# Patient Record
Sex: FEMALE | Race: WHITE | NOT HISPANIC OR LATINO | Employment: UNEMPLOYED | URBAN - METROPOLITAN AREA
[De-identification: names, ages, dates, MRNs, and addresses within clinical notes are randomized per-mention and may not be internally consistent; named-entity substitution may affect disease eponyms.]

---

## 2017-01-18 ENCOUNTER — INITIAL CONSULT (OUTPATIENT)
Dept: RHEUMATOLOGY | Facility: CLINIC | Age: 57
End: 2017-01-18
Payer: COMMERCIAL

## 2017-01-18 VITALS
HEART RATE: 73 BPM | WEIGHT: 232.5 LBS | SYSTOLIC BLOOD PRESSURE: 134 MMHG | HEIGHT: 65 IN | BODY MASS INDEX: 38.74 KG/M2 | DIASTOLIC BLOOD PRESSURE: 90 MMHG

## 2017-01-18 DIAGNOSIS — M35.00 SICCA SYNDROME, UNSPECIFIED: Primary | ICD-10-CM

## 2017-01-18 DIAGNOSIS — M47.816 SPONDYLOSIS OF LUMBAR REGION WITHOUT MYELOPATHY OR RADICULOPATHY: ICD-10-CM

## 2017-01-18 PROCEDURE — 1159F MED LIST DOCD IN RCRD: CPT | Mod: S$GLB,,, | Performed by: INTERNAL MEDICINE

## 2017-01-18 PROCEDURE — 99999 PR PBB SHADOW E&M-EST. PATIENT-LVL III: CPT | Mod: PBBFAC,,, | Performed by: INTERNAL MEDICINE

## 2017-01-18 PROCEDURE — 99204 OFFICE O/P NEW MOD 45 MIN: CPT | Mod: S$GLB,,, | Performed by: INTERNAL MEDICINE

## 2017-01-18 ASSESSMENT — ROUTINE ASSESSMENT OF PATIENT INDEX DATA (RAPID3)
TOTAL RAPID3 SCORE: 2.44
FATIGUE SCORE: 3
WHEN YOU AWAKENED IN THE MORNING OVER THE LAST WEEK, PLEASE INDICATE THE AMOUNT OF TIME IT TAKES UNTIL YOU ARE AS LIMBER AS YOU WILL BE FOR THE DAY: 20 MIN
PSYCHOLOGICAL DISTRESS SCORE: 3.3
AM STIFFNESS SCORE: 1, YES
MDHAQ FUNCTION SCORE: .7
PATIENT GLOBAL ASSESSMENT SCORE: 2
PAIN SCORE: 3

## 2017-01-18 NOTE — LETTER
January 18, 2017      Shyanne Juarez MD  [address] [place] [address]           [facility] - Rheumatology  [address]  [place] [address]  Phone: [phone]  Fax: [phone]          Patient: Samuel Kilgore   MR Number: [id number]   YOB: 1960   Date of Visit: 1/18/2017       Dear Dr. Shyanne Juarez:    Thank you for referring Samuel Kilgore to me for evaluation. Attached you will find relevant portions of my assessment and plan of care.    If you have questions, please do not hesitate to call me. I look forward to following Samuel Kiglore along with you.    Sincerely,    Surya Boucher MD    Enclosure  CC:  No Recipients    If you would like to receive this communication electronically, please contact [email or web address] or [phone] to request more information on [employer] access.    For providers and/or their staff who would like to refer a patient to [facility], please contact us through our one-stop-shop provider referral line, [facility], at [phone].    If you feel you have received this communication in error or would no longer like to receive these types of communications, please e-mail [email or web address]

## 2017-01-18 NOTE — PROGRESS NOTES
"Subjective:       Patient ID: Samuel Kilgore is a 56 y.o. female.    Chief Complaint: Consult (Abnormal blood tests)    HPI   The patient is referred by her neurologist for abnormal blood tests and possible Sjogren syndrome    Hx migraines; was very tired and neurologist did lab and found very low Vit D and "lab for Sjogren's syndrome"    Migraines for many years and did not know it"    Had chemo after breast cancer 6 years ago; intermittent fatigue since and bouts of depression   is a passive aggressive control freak  Has had long bouts of depression at times  Has not exercised in 2 years; [facility] and [facility] 15 [facility]; developed plantar fasciitis  Got on elliptical yesterday; 30 min; LE pain  Thigh muscles hurt all the time and are tender and sensitive   Used to get bursitis in shoulders; now for last 6 mo trapezius pain  Plans to keep exercising now  Back stiff in am; chiropractor said she was very stiff    Takes trazodone every night  Letrozole every night     Recall taking gabapentin for sciatica for a few weeks then shots worked   Currently using essential oils and "they work"                                  Widespread pain index  Note the areas which the patient has had pain over the last week:                   Shoulder-girdle, left o               Shoulder-girdle, righto                         Upper arm lefto                       Upper arm righto                         Lower arm lefto                       Lower arm righto    Hip (buttock, trochanter) leftx  Hip (buttock, trochanter) rightx                           Upper leg, leftx                         Upper leg, rightx                           Lower leg, lefto                         Lower leg, righto                                     Jaw, lefto                                   Jaw, righto                                        Chestx                                  Abdomeno                               Upper backo   "                            Lower backx                                        Necko  Score will be from 0-19: 6                                         Symptom severity score  Fatigue  2  Waking Unrefreshed  1  Cognitive Symptoms  1   0 = no problem, 1=slight or mild problem 2= moderate; considerable problems often present and/or at a moderate level, 3 = severe, pervasive, continuous, life disturbing problem  For each of the 3 symptoms, indicate the level of severity over the past week using the Scale.  The symptom severity score is the sum of the severity of the 3 symptoms (fatigue, waking unrefreshed, and cognitive symptoms) plus the number of the following symptoms occurring during the previous 6 months:   Headaches 1  Pain or cramps in the lower abdomen 0  Depression  1-2  The final score is between 0 and 12: 6-7                                          Criteria  Patient has fibromyalgia if the following 3 conditions are met:  1.  Widespread pain index greater than or equal to 7 and symptom severity score greater than or equal to 5 or widespread pain index between 3- 6, and symptom severity score greater than or equal to 9.    2.  Symptoms have been present in a similar level for at least 3 months  3.  The patient does not have a disorder that would otherwise sufficiently explain the pain    Review of Systems   Constitutional: Negative for fatigue, fever and unexpected weight change.   HENT: Negative for mouth sores and trouble swallowing.         Dry mouth   Eyes: Negative for redness.        Dry eyes   Respiratory: Positive for shortness of breath. Negative for cough.    Cardiovascular: Negative for chest pain.   Gastrointestinal: Positive for constipation. Negative for abdominal pain and diarrhea.        Heartburn    Skin: Negative for rash.   Neurological: Positive for headaches.        Skin feel sensitive and burns on feet   Hematological: Negative for adenopathy. Does not bruise/bleed easily.  "  Psychiatric/Behavioral: Negative for sleep disturbance.         Objective:     Visit Vitals    BP (!) 134/90    Pulse 73    Ht 5' 5" (1.651 m)    Wt 105.5 kg (232 lb 8 oz)    BMI 38.69 kg/m2        Physical Exam   Constitutional: She is well-developed, well-nourished, and in no distress.   She is very chatty   HENT:   Mouth/Throat: Oropharynx is clear and moist.   Oral mucous membranes are moist  No submandibular or parotid enlargement   Eyes: Conjunctivae are normal.   No conjunctival injection   Cardiovascular: Normal rate and regular rhythm.    Pulmonary/Chest: She has no wheezes. She has no rales.   Lymphadenopathy:     She has no cervical adenopathy.   Neurological: She is alert.   Skin: No rash noted.           Assessment:       1. Sicca syndrome, unspecified        She has intermittent symptoms of dry eyes and dry mouth but she also has a somewhat complicated past medical history including breast cancer surgery and current hormonal chemotherapy.  She does not have physical changes suspicious for Sjogren syndrome but I will obtain additional laboratory tests to investigate    She has a lot of somatic symptoms.  She does not quite meet criteria for fibromyalgia but I suspect that she has in the past.  Stress and depression and sleep disorder clearly contribute to this  Plan:     1.  Repeat antibodies for Sjogren's syndrome and also check inflammatory markers and protein like pheresis  2.  We discussed fibromyalgia and I did not recommend medications, but did talk to her about exercise, stress management, and sleep hygiene.  She would probably be a good candidate for Cymbalta if the fibromyalgia symptoms worsen in the future  3.  I will determine follow-up after obtaining laboratory studies.  Copies of this note and additional labs will be sent to her neurologist.        "

## 2017-01-24 ENCOUNTER — PATIENT MESSAGE (OUTPATIENT)
Dept: RHEUMATOLOGY | Facility: CLINIC | Age: 57
End: 2017-01-24

## 2017-03-19 DIAGNOSIS — K21.9 GASTROESOPHAGEAL REFLUX DISEASE, ESOPHAGITIS PRESENCE NOT SPECIFIED: ICD-10-CM

## 2017-03-19 RX ORDER — ESOMEPRAZOLE MAGNESIUM 40 MG/1
CAPSULE, DELAYED RELEASE ORAL
Qty: 30 CAPSULE | Refills: 0 | Status: SHIPPED | OUTPATIENT
Start: 2017-03-19 | End: 2017-05-29 | Stop reason: SDUPTHER

## 2017-05-17 ENCOUNTER — OFFICE VISIT (OUTPATIENT)
Dept: FAMILY MEDICINE | Facility: CLINIC | Age: 57
End: 2017-05-17
Payer: COMMERCIAL

## 2017-05-17 VITALS
BODY MASS INDEX: 38.75 KG/M2 | HEIGHT: 65 IN | DIASTOLIC BLOOD PRESSURE: 80 MMHG | HEART RATE: 84 BPM | WEIGHT: 232.56 LBS | OXYGEN SATURATION: 95 % | TEMPERATURE: 99 F | SYSTOLIC BLOOD PRESSURE: 132 MMHG

## 2017-05-17 DIAGNOSIS — R09.82 POST-NASAL DRIP: ICD-10-CM

## 2017-05-17 DIAGNOSIS — J06.9 VIRAL URI WITH COUGH: Primary | ICD-10-CM

## 2017-05-17 DIAGNOSIS — R09.81 NASAL CONGESTION: ICD-10-CM

## 2017-05-17 PROCEDURE — 1160F RVW MEDS BY RX/DR IN RCRD: CPT | Mod: S$GLB,,, | Performed by: NURSE PRACTITIONER

## 2017-05-17 PROCEDURE — 99999 PR PBB SHADOW E&M-EST. PATIENT-LVL IV: CPT | Mod: PBBFAC,,, | Performed by: NURSE PRACTITIONER

## 2017-05-17 PROCEDURE — 99214 OFFICE O/P EST MOD 30 MIN: CPT | Mod: S$GLB,,, | Performed by: NURSE PRACTITIONER

## 2017-05-17 RX ORDER — PROMETHAZINE HYDROCHLORIDE AND DEXTROMETHORPHAN HYDROBROMIDE 6.25; 15 MG/5ML; MG/5ML
5 SYRUP ORAL NIGHTLY
Qty: 180 ML | Refills: 0 | Status: SHIPPED | OUTPATIENT
Start: 2017-05-17 | End: 2017-05-27

## 2017-05-17 RX ORDER — BENZONATATE 100 MG/1
100 CAPSULE ORAL 3 TIMES DAILY PRN
Qty: 90 CAPSULE | Refills: 0 | Status: SHIPPED | OUTPATIENT
Start: 2017-05-17 | End: 2017-06-16

## 2017-05-17 RX ORDER — LEVOCETIRIZINE DIHYDROCHLORIDE 5 MG/1
5 TABLET, FILM COATED ORAL NIGHTLY
Qty: 30 TABLET | Refills: 0 | Status: SHIPPED | OUTPATIENT
Start: 2017-05-17 | End: 2018-09-10 | Stop reason: ALTCHOICE

## 2017-05-17 NOTE — PATIENT INSTRUCTIONS
Viral Upper Respiratory Illness (Adult)  You have a viral upper respiratory illness (URI), which is another term for the common cold. This illness is contagious during the first few days. It is spread through the air by coughing and sneezing. It may also be spread by direct contact (touching the sick person and then touching your own eyes, nose, or mouth). Frequent handwashing will decrease risk of spread. Most viral illnesses go away within 7 to 10 days with rest and simple home remedies. Sometimes the illness may last for several weeks. Antibiotics will not kill a virus, and they are generally not prescribed for this condition.    Home care  · If symptoms are severe, rest at home for the first 2 to 3 days. When you resume activity, don't let yourself get too tired.  · Avoid being exposed to cigarette smoke (yours or others).  · You may use acetaminophen or ibuprofen to control pain and fever, unless another medicine was prescribed. (Note: If you have chronic liver or kidney disease, have ever had a stomach ulcer or gastrointestinal bleeding, or are taking blood-thinning medicines, talk with your healthcare provider before using these medicines.) Aspirin should never be given to anyone under 18 years of age who is ill with a viral infection or fever. It may cause severe liver or brain damage.  · Your appetite may be poor, so a light diet is fine. Avoid dehydration by drinking 6 to 8 glasses of fluids per day (water, soft drinks, juices, tea, or soup). Extra fluids will help loosen secretions in the nose and lungs.  · Over-the-counter cold medicines will not shorten the length of time youre sick, but they may be helpful for the following symptoms: cough, sore throat, and nasal and sinus congestion. (Note: Do not use decongestants if you have high blood pressure.)  Follow-up care  Follow up with your healthcare provider, or as advised.  When to seek medical advice  Call your healthcare provider right away if any  of these occur:  · Cough with lots of colored sputum (mucus)  · Severe headache; face, neck, or ear pain  · Difficulty swallowing due to throat pain  · Fever of 100.4°F (38°C)  Call 911, or get immediate medical care  Call emergency services right away if any of these occur:  · Chest pain, shortness of breath, wheezing, or difficulty breathing  · Coughing up blood  · Inability to swallow due to throat pain  Date Last Reviewed: 9/13/2015  © 3616-2854 [employer]. [address] [place] [address]. All rights reserved. This information is not intended as a substitute for professional medical care. Always follow your healthcare professional's instructions.

## 2017-05-17 NOTE — PROGRESS NOTES
History of Present Illness   Samuel Kilgore is a 56 y.o. woman with medical history as listed below who presents today for evaluation of URI symptoms which began this morning and have worsened throughout the day. She complains of nasal congestion, post nasal drip, sore throat, and productive cough. She states symptoms are making it difficult for her to rest. She also has a low grade fever and generalized fatigue. She would like to address cough as this commonly triggers a migraine. She has not tried OTC medications as she uses essential oils in place of medication if at all possible. She used her eucalyptus oil this morning with relief. She has no additional complaints and is otherwise healthy on today's visit.      Past Medical History:   Diagnosis Date    Anxiety     Cancer     Breast    Encounter for blood transfusion     Lumbar spondylosis     Migraine headache     Mild vitamin D deficiency        Past Surgical History:   Procedure Laterality Date    BREAST RECONSTRUCTION       SECTION       SECTION      MASTECTOMY      bilateral       Social History     Social History    Marital status:      Spouse name: N/A    Number of children: N/A    Years of education: N/A     Social History Main Topics    Smoking status: Never Smoker    Smokeless tobacco: Never Used    Alcohol use Yes      Comment: occassionally    Drug use: No    Sexual activity: Yes     Partners: Male     Other Topics Concern    Are You Pregnant Or Think You May Be? No    Breast-Feeding No     Social History Narrative       Family History   Problem Relation Age of Onset    Heart disease Maternal Grandfather     Diabetes Maternal Grandfather     Colon cancer Paternal Grandmother     Melanoma Paternal Grandmother     Allergies Child     Asthma Child     Cancer Neg Hx     Miscarriages / Stillbirths Neg Hx     Psoriasis Neg Hx     Lupus Neg Hx     Eczema Neg Hx     Acne Neg Hx        Review of  "Systems  Review of Systems   Constitutional: Positive for fever and malaise/fatigue. Negative for chills.   HENT: Positive for congestion and sore throat. Negative for ear discharge and ear pain.    Eyes: Negative for discharge and redness.   Respiratory: Positive for cough and sputum production. Negative for shortness of breath and wheezing.    Cardiovascular: Negative for chest pain.   Gastrointestinal: Negative for nausea and vomiting.   Neurological: Negative for headaches.     A complete review of systems was otherwise negative.    Physical Exam  /80 (BP Location: Right arm, Patient Position: Sitting, BP Method: Manual)  Pulse 84  Temp 99.1 °F (37.3 °C) (Oral)   Ht 5' 5" (1.651 m)  Wt 105.5 kg (232 lb 9.4 oz)  SpO2 95%  BMI 38.7 kg/m2  General appearance: alert, appears stated age, cooperative and no distress  Eyes: negative findings: lids and lashes normal and conjunctivae and sclerae normal  Ears: normal TM's and external ear canals both ears  Nose: clear discharge, severe congestion, turbinates red, swollen, no sinus tenderness  Throat: lips, mucosa, and tongue normal; teeth and gums normal and moderate erythema with clear PND  Lungs: clear to auscultation bilaterally  Heart: regular rate and rhythm, S1, S2 normal, no murmur, click, rub or gallop  Lymph nodes: Cervical, supraclavicular, and axillary nodes normal.  Neurologic: Grossly normal    Assessment/Plan  Viral URI with cough  Xyzal for nasal congestion and post nasal drip, patient does not take Atarax currently, advised not to take with Xyzal.  She has Flonase at home, instructed to resume.  Tessalon and nighttime cough syrup as needed for cough.  Tylenol for fever.  Rest and increase fluid intake.  May continue essential oils if providing benefit.  Discussed antibiotics are not indicated as this is viral.  Contact me or RTC if symptoms worsen or fail to improve as expected.  -     levocetirizine (XYZAL) 5 MG tablet; Take 1 tablet (5 mg " total) by mouth every evening.  Dispense: 30 tablet; Refill: 0  -     benzonatate (TESSALON) 100 MG capsule; Take 1 capsule (100 mg total) by mouth 3 (three) times daily as needed.  Dispense: 90 capsule; Refill: 0  -     promethazine-dextromethorphan (PROMETHAZINE-DM) 6.25-15 mg/5 mL Syrp; Take 5 mLs by mouth every evening.  Dispense: 180 mL; Refill: 0    Post-nasal drip  As above.  -     levocetirizine (XYZAL) 5 MG tablet; Take 1 tablet (5 mg total) by mouth every evening.  Dispense: 30 tablet; Refill: 0  -     benzonatate (TESSALON) 100 MG capsule; Take 1 capsule (100 mg total) by mouth 3 (three) times daily as needed.  Dispense: 90 capsule; Refill: 0  -     promethazine-dextromethorphan (PROMETHAZINE-DM) 6.25-15 mg/5 mL Syrp; Take 5 mLs by mouth every evening.  Dispense: 180 mL; Refill: 0    Nasal congestion  As above.  -     levocetirizine (XYZAL) 5 MG tablet; Take 1 tablet (5 mg total) by mouth every evening.  Dispense: 30 tablet; Refill: 0  -     benzonatate (TESSALON) 100 MG capsule; Take 1 capsule (100 mg total) by mouth 3 (three) times daily as needed.  Dispense: 90 capsule; Refill: 0  -     promethazine-dextromethorphan (PROMETHAZINE-DM) 6.25-15 mg/5 mL Syrp; Take 5 mLs by mouth every evening.  Dispense: 180 mL; Refill: 0    She has verbalized understanding and is in agreement with plan of care.  Return if symptoms worsen or fail to improve.

## 2017-05-17 NOTE — MR AVS SNAPSHOT
[facility]  [address]  [place] [address]  Phone: [phone]  Fax: [phone]                  Samuel Kilgore   2017 4:00 PM   Office Visit    Description:  Female : 1960   Provider:  Tracie Ibarra NP   Department:  [facility] Medicine           Reason for Visit     sore throat, cough           Diagnoses this Visit        Comments    Viral URI with cough    -  Primary     Post-nasal drip         Nasal congestion                To Do List           Goals (5 Years of Data)     None      Follow-Up and Disposition     Return if symptoms worsen or fail to improve.       These Medications        Disp Refills Start End    levocetirizine (XYZAL) 5 MG tablet 30 tablet 0 2017    Take 1 tablet (5 mg total) by mouth every evening. - Oral    Pharmacy: [facility]/pharmacy #[id number] - [place] - [address] [facility] Ph #: [phone]       benzonatate (TESSALON) 100 MG capsule 90 capsule 0 2017    Take 1 capsule (100 mg total) by mouth 3 (three) times daily as needed. - Oral    Pharmacy: [facility]/pharmacy #[id number] - [place] - [address] [facility] Ph #: [phone]       promethazine-dextromethorphan (PROMETHAZINE-DM) 6.25-15 mg/5 mL Syrp 180 mL 0 2017    Take 5 mLs by mouth every evening. - Oral    Pharmacy: [facility]/pharmacy #[id number] - [place] [facility] [address] [facility] Ph #: [phone]         [facility]     [facility] Nurse Care Line - 24/ Assistance  Unless otherwise directed by your provider, please contact [facility], our nurse care line that is available for / assistance.     Registered nurses in the [facility] Center provide: appointment scheduling, clinical advisement, health education, and other advisory services.  Call: [phone] (toll free)               Medications           Message regarding Medications     Verify the changes and/or additions to your medication regime listed below are the  same as discussed with your clinician today.  If any of these changes or additions are incorrect, please notify your healthcare provider.        START taking these NEW medications        Refills    levocetirizine (XYZAL) 5 MG tablet 0    Sig: Take 1 tablet (5 mg total) by mouth every evening.    Class: Normal    Route: Oral    benzonatate (TESSALON) 100 MG capsule 0    Sig: Take 1 capsule (100 mg total) by mouth 3 (three) times daily as needed.    Class: Normal    Route: Oral    promethazine-dextromethorphan (PROMETHAZINE-DM) 6.25-15 mg/5 mL Syrp 0    Sig: Take 5 mLs by mouth every evening.    Class: Normal    Route: Oral           Verify that the below list of medications is an accurate representation of the medications you are currently taking.  If none reported, the list may be blank. If incorrect, please contact your healthcare provider. Carry this list with you in case of emergency.           Current Medications     clonazePAM (KLONOPIN) 0.5 MG tablet Take 0.5 mg by mouth 2 (two) times daily.    ergocalciferol (ERGOCALCIFEROL) 50,000 unit Cap     esomeprazole (NEXIUM) 40 MG capsule TAKE ONE CAPSULE BY MOUTH EVERY MORNING BEFORE BREAKFAST    eszopiclone (LUNESTA) 2 MG Tab Take by mouth. 1 Tablet Oral At bedtime    FLUARIX QUAD 3911-6915, PF, 60 mcg (15 mcg x 4)/0.5 mL Syrg TO BE ADMINISTERED BY PHARMACIST FOR IMMUNIZATION    fluocinonide (LIDEX) 0.05 % external solution Apply topically once daily. To scalp    hydrOXYzine (ATARAX) 25 MG tablet Take 25 mg by mouth 2 (two) times daily.    JUBLIA 10 % Gerardo APPLY TO NAILS EVERY DAY    ketoconazole (NIZORAL) 2 % shampoo Apply topically every other day.    letrozole (FEMARA) 2.5 mg Tab TAKE 1 TABLET (2.5 MG TOTAL) BY MOUTH ONCE DAILY.    lorcaserin 10 mg Tab Take 10 mg by mouth 2 (two) times daily.    rizatriptan (MAXALT) 10 MG tablet     trazodone (DESYREL) 150 MG tablet Take 150 mg by mouth every evening.    zolmitriptan (ZOMIG) 5 MG tablet     benzonatate (TESSALON)  "100 MG capsule Take 1 capsule (100 mg total) by mouth 3 (three) times daily as needed.    levocetirizine (XYZAL) 5 MG tablet Take 1 tablet (5 mg total) by mouth every evening.    promethazine-dextromethorphan (PROMETHAZINE-DM) 6.25-15 mg/5 mL Syrp Take 5 mLs by mouth every evening.           Clinical Reference Information           Your Vitals Were     BP Pulse Temp Height Weight SpO2    132/80 (BP Location: Right arm, Patient Position: Sitting, BP Method: Manual) 84 99.1 °F (37.3 °C) (Oral) 5' 5" (1.651 m) 105.5 kg (232 lb 9.4 oz) 95%    BMI                38.7 kg/m2          Blood Pressure          Most Recent Value    BP  132/80      Allergies as of 5/17/2017     No Known Allergies      Immunizations Administered on Date of Encounter - 5/17/2017     None      Instructions      Viral Upper Respiratory Illness (Adult)  You have a viral upper respiratory illness (URI), which is another term for the common cold. This illness is contagious during the first few days. It is spread through the air by coughing and sneezing. It may also be spread by direct contact (touching the sick person and then touching your own eyes, nose, or mouth). Frequent handwashing will decrease risk of spread. Most viral illnesses go away within 7 to 10 days with rest and simple home remedies. Sometimes the illness may last for several weeks. Antibiotics will not kill a virus, and they are generally not prescribed for this condition.    Home care  · If symptoms are severe, rest at home for the first 2 to 3 days. When you resume activity, don't let yourself get too tired.  · Avoid being exposed to cigarette smoke (yours or others).  · You may use acetaminophen or ibuprofen to control pain and fever, unless another medicine was prescribed. (Note: If you have chronic liver or kidney disease, have ever had a stomach ulcer or gastrointestinal bleeding, or are taking blood-thinning medicines, talk with your healthcare provider before using these " medicines.) Aspirin should never be given to anyone under 18 years of age who is ill with a viral infection or fever. It may cause severe liver or brain damage.  · Your appetite may be poor, so a light diet is fine. Avoid dehydration by drinking 6 to 8 glasses of fluids per day (water, soft drinks, juices, tea, or soup). Extra fluids will help loosen secretions in the nose and lungs.  · Over-the-counter cold medicines will not shorten the length of time youre sick, but they may be helpful for the following symptoms: cough, sore throat, and nasal and sinus congestion. (Note: Do not use decongestants if you have high blood pressure.)  Follow-up care  Follow up with your healthcare provider, or as advised.  When to seek medical advice  Call your healthcare provider right away if any of these occur:  · Cough with lots of colored sputum (mucus)  · Severe headache; face, neck, or ear pain  · Difficulty swallowing due to throat pain  · Fever of 100.4°F (38°C)  Call 911, or get immediate medical care  Call emergency services right away if any of these occur:  · Chest pain, shortness of breath, wheezing, or difficulty breathing  · Coughing up blood  · Inability to swallow due to throat pain  Date Last Reviewed: 9/13/2015 © 2000-2016 [employer]. [address] [place] [address]. All rights reserved. This information is not intended as a substitute for professional medical care. Always follow your healthcare professional's instructions.             Language Assistance Services     ATTENTION: Language assistance services are available, free of charge. Please call [phone].      ATENCIÓN: Si habla español, tiene a cintron disposición servicios gratuitos de asistencia lingüística. Llame al [phone].     [facility] Ý: N?u b?n nói Ti?ng Vi?t, có các d?ch v? h? tr? ngôn ng? mi?n phí dành cho b?n. G?i s? [phone].         [facility] complies with applicable Federal civil rights laws and  does not discriminate on the basis of race, color, national origin, age, disability, or sex.

## 2017-05-29 DIAGNOSIS — K21.9 GASTROESOPHAGEAL REFLUX DISEASE, ESOPHAGITIS PRESENCE NOT SPECIFIED: ICD-10-CM

## 2017-05-31 RX ORDER — ESOMEPRAZOLE MAGNESIUM 40 MG/1
CAPSULE, DELAYED RELEASE ORAL
Qty: 30 CAPSULE | Refills: 0 | Status: SHIPPED | OUTPATIENT
Start: 2017-05-31 | End: 2017-07-09 | Stop reason: SDUPTHER

## 2017-06-14 DIAGNOSIS — Z85.3 HX: BREAST CANCER: Chronic | ICD-10-CM

## 2017-06-14 DIAGNOSIS — R09.82 POST-NASAL DRIP: ICD-10-CM

## 2017-06-14 DIAGNOSIS — R09.81 NASAL CONGESTION: ICD-10-CM

## 2017-06-14 DIAGNOSIS — J06.9 VIRAL URI WITH COUGH: ICD-10-CM

## 2017-06-14 RX ORDER — LETROZOLE 2.5 MG/1
2.5 TABLET, FILM COATED ORAL DAILY
Qty: 90 TABLET | Refills: 1 | Status: SHIPPED | OUTPATIENT
Start: 2017-06-14 | End: 2018-01-10 | Stop reason: SDUPTHER

## 2017-06-14 RX ORDER — LEVOCETIRIZINE DIHYDROCHLORIDE 5 MG/1
5 TABLET, FILM COATED ORAL NIGHTLY
Qty: 30 TABLET | Refills: 0 | OUTPATIENT
Start: 2017-06-14 | End: 2018-06-14

## 2017-07-09 DIAGNOSIS — K21.9 GASTROESOPHAGEAL REFLUX DISEASE, ESOPHAGITIS PRESENCE NOT SPECIFIED: ICD-10-CM

## 2017-07-09 RX ORDER — ESOMEPRAZOLE MAGNESIUM 40 MG/1
CAPSULE, DELAYED RELEASE ORAL
Qty: 30 CAPSULE | Refills: 0 | Status: SHIPPED | OUTPATIENT
Start: 2017-07-09 | End: 2018-11-08 | Stop reason: SDUPTHER

## 2017-08-22 ENCOUNTER — TELEPHONE (OUTPATIENT)
Dept: HEMATOLOGY/ONCOLOGY | Facility: CLINIC | Age: 57
End: 2017-08-22

## 2017-08-22 NOTE — TELEPHONE ENCOUNTER
----- Message from Jacqui Kaur RN sent at 8/22/2017 11:39 AM CDT -----  Contact: Pt      ----- Message -----  From: Sharon Pizarro  Sent: 8/22/2017  11:19 AM  To: [facility]    PT called to speak to the nurse to reschedule a missed appt and would like a call back.    Pt can be reached at [phone].    Thanks

## 2017-09-05 ENCOUNTER — OFFICE VISIT (OUTPATIENT)
Dept: HEMATOLOGY/ONCOLOGY | Facility: CLINIC | Age: 57
End: 2017-09-05
Payer: COMMERCIAL

## 2017-09-05 VITALS
BODY MASS INDEX: 35.03 KG/M2 | HEIGHT: 66 IN | DIASTOLIC BLOOD PRESSURE: 80 MMHG | HEART RATE: 65 BPM | RESPIRATION RATE: 18 BRPM | WEIGHT: 218 LBS | SYSTOLIC BLOOD PRESSURE: 136 MMHG | TEMPERATURE: 98 F

## 2017-09-05 DIAGNOSIS — Z79.811 USE OF AROMATASE INHIBITORS: Primary | ICD-10-CM

## 2017-09-05 DIAGNOSIS — Z17.0 MALIGNANT NEOPLASM OF AXILLARY TAIL OF RIGHT BREAST IN FEMALE, ESTROGEN RECEPTOR POSITIVE: ICD-10-CM

## 2017-09-05 DIAGNOSIS — C50.611 MALIGNANT NEOPLASM OF AXILLARY TAIL OF RIGHT BREAST IN FEMALE, ESTROGEN RECEPTOR POSITIVE: ICD-10-CM

## 2017-09-05 PROCEDURE — 99213 OFFICE O/P EST LOW 20 MIN: CPT | Mod: S$GLB,,, | Performed by: INTERNAL MEDICINE

## 2017-09-05 PROCEDURE — 3008F BODY MASS INDEX DOCD: CPT | Mod: S$GLB,,, | Performed by: INTERNAL MEDICINE

## 2017-09-05 NOTE — PROGRESS NOTES
Subjective:       Patient ID: Samuel Kilgore is a 56 y.o. female.    Chief Complaint: No chief complaint on file.    HPI   Mrs. Kilgore returns today for followup for her adjuvant treatment of breast cancer. As of March 2015  she has been on letrozole in the extended adjuvant setting.  Her most recent DXA scan ahd shown normal bone density of the hip and the L spine.    Briefly, she is a 56-year-old female status post bilateral mastectomies, status post adjuvant chemotherapy, currently on adjuvant tamoxifen for her stage II breast cancer (T1 N1 M0).   Her cancer was ER positive, [place] positive, and HER-2 negative. Her chemotherapy consisted of 4 cycles of dose-dense AC followed by four cycle of DD Taxol. She then took tamoxifen x 5 years.  She was started on letrozole in March 2016.    Review of Systems  Overall she feels OK.  She has tolerated the letrozole well so far; she does report occasional hot flashes but overall they have decreased in severity over the years.  She denies any anxiety, easy bruising, fevers, chills, night sweats, weight loss, nausea, vomiting, diarrhea, constipation, diplopia, blurred vision, headache, chest pain, or abdominal pain. She is asking why she is not getting PET scans for follow up.     Objective:      Physical Exam   GENERAL: She is alert, oriented to time, place, person, pleasant, well nourished, in no acute physical distress.   VITAL SIGNS: Reviewed.   HEENT: Normal. There are no nasal, oral, lip, gingival, auricular, lid, or conjunctival lesions. Mucosae are moist and pink, and there is no thrush. Pupils are equal, reactive to light and accommodation. Extraocular muscle movements are intact.   NECK: Supple without JVD, adenopathy, or thyromegaly.   LUNGS: Clear to auscultation without wheezing, rales, or rhonchi.   CARDIOVASCULAR: Reveals an S1, S2, no murmurs, no rubs, and no gallops.   BREASTS: She is status post bilateral nipple-sparing mastectomies with no evidence of chest  wall recurrence. There are no masses in either breast. There is no induration or tenderness in either breast.   ABDOMEN: Soft, obese, nontender, without organomegaly. Bowel sounds are present.   EXTREMITIES: No cyanosis, clubbing, or edema.   SKIN: Does not have petechiae, rashes, induration, or ecchymoses.   NEUROLOGIC: Motor function is 5/5, DTRs are 0-1+ bilaterally, symmetrical, and cranial nerves within normal.     Assessment:       1. Use of aromatase inhibitors    2. Malignant neoplasm of axillary tail of right breast in female, estrogen receptor positive        Plan:          I have asked her to remain on letrozole, which she will take through early March 2021.  I will see her again in 6 months.  We will repeat her DXA scan in 18 months.  Her questions were answered to her satisfaction.

## 2017-09-06 DIAGNOSIS — K21.9 GASTROESOPHAGEAL REFLUX DISEASE, ESOPHAGITIS PRESENCE NOT SPECIFIED: ICD-10-CM

## 2017-09-06 RX ORDER — ESOMEPRAZOLE MAGNESIUM 40 MG/1
CAPSULE, DELAYED RELEASE ORAL
Qty: 30 CAPSULE | Refills: 0 | OUTPATIENT
Start: 2017-09-06

## 2017-09-10 DIAGNOSIS — K21.9 GASTROESOPHAGEAL REFLUX DISEASE, ESOPHAGITIS PRESENCE NOT SPECIFIED: ICD-10-CM

## 2017-09-10 RX ORDER — ESOMEPRAZOLE MAGNESIUM 40 MG/1
CAPSULE, DELAYED RELEASE ORAL
Qty: 30 CAPSULE | Refills: 0 | OUTPATIENT
Start: 2017-09-10

## 2017-11-01 ENCOUNTER — TELEPHONE (OUTPATIENT)
Dept: INTERNAL MEDICINE | Facility: CLINIC | Age: 57
End: 2017-11-01

## 2017-11-01 NOTE — TELEPHONE ENCOUNTER
----- Message from Ambrosio Mckeon sent at 11/1/2017  1:36 PM CDT -----  Contact: Pt   Pt would like a call back from nurse    Pt did not state reason for call    Can be reached at [phone]

## 2017-11-10 ENCOUNTER — OFFICE VISIT (OUTPATIENT)
Dept: FAMILY MEDICINE | Facility: CLINIC | Age: 57
End: 2017-11-10
Payer: COMMERCIAL

## 2017-11-10 VITALS
BODY MASS INDEX: 35.26 KG/M2 | WEIGHT: 219.38 LBS | TEMPERATURE: 98 F | DIASTOLIC BLOOD PRESSURE: 82 MMHG | SYSTOLIC BLOOD PRESSURE: 118 MMHG | HEART RATE: 70 BPM | OXYGEN SATURATION: 98 % | HEIGHT: 66 IN

## 2017-11-10 DIAGNOSIS — A08.4 VIRAL GASTROENTERITIS: Primary | ICD-10-CM

## 2017-11-10 PROCEDURE — 99999 PR PBB SHADOW E&M-EST. PATIENT-LVL III: CPT | Mod: PBBFAC,,, | Performed by: INTERNAL MEDICINE

## 2017-11-10 PROCEDURE — 99213 OFFICE O/P EST LOW 20 MIN: CPT | Mod: S$GLB,,, | Performed by: INTERNAL MEDICINE

## 2017-11-10 RX ORDER — ONDANSETRON 4 MG/1
4 TABLET, ORALLY DISINTEGRATING ORAL EVERY 6 HOURS PRN
Qty: 10 TABLET | Refills: 0
Start: 2017-11-10 | End: 2017-11-10 | Stop reason: SDUPTHER

## 2017-11-10 RX ORDER — ONDANSETRON 4 MG/1
4 TABLET, ORALLY DISINTEGRATING ORAL EVERY 6 HOURS PRN
Qty: 10 TABLET | Refills: 0 | Status: SHIPPED | OUTPATIENT
Start: 2017-11-10 | End: 2018-01-11

## 2017-11-10 NOTE — PROGRESS NOTES
This note was created by combination of typed  and Dragon dictation.  Transcription errors may be present.  If there are any questions, please contact me.    Assessment & Plan  Viral gastroenteritis-not consistent with appendectomy, cholecystitis, diverticulitis.  Rest, fluids, BRAT diet, zofran PRN. immodium OTC PRn.  Red flag signs discussed - if pain, blood in stool, fever, would present to ER over the weekend.  -     Discontinue: ondansetron (ZOFRAN-ODT) 4 MG TbDL; Take 1 tablet (4 mg total) by mouth every 6 (six) hours as needed.  Dispense: 10 tablet; Refill: 0  -     ondansetron (ZOFRAN-ODT) 4 MG TbDL; Take 1 tablet (4 mg total) by mouth every 6 (six) hours as needed.  Dispense: 10 tablet; Refill: 0        Medications Discontinued During This Encounter   Medication Reason    FLUARIX QUAD 1006-8435, PF, 60 mcg (15 mcg x 4)/0.5 mL Syrg Therapy completed    letrozole (FEMARA) 2.5 mg Tab Duplicate Order       Follow-up: No Follow-up on file.      =================================================================      Chief Complaint   Patient presents with    Diarrhea       HPI  Samuel is a 56 y.o. female, last appointment with this clinic was 5/17/2017.    No LMP recorded. Patient is postmenopausal.    Diarrhea x 4 days.  Very watery. Initial stomach pain.  That has improved.  Low appetite.  Gurgling.  Is afraid to eat - might hurt her stomach.  Can go hours between bowel movements.  Ate chicken broth and carrots last night.  No vomiting.  She gets migraines and triggered with this. Can't sleep with this so tired.  Yes chills with this.  Sick contacts none.  Some immodium and pepto bismol for this.  6 immodium pills total. Normal urination.  Staying hydrated    Patient Care Team:  Justine Muñoz MD as PCP - General (Internal Medicine)    Patient Active Problem List    Diagnosis Date Noted    Malignant neoplasm of right breast 04/07/2016    Use of aromatase inhibitors 04/07/2016    Lumbar  radicular pain 2015    Spondylosis without myelopathy 2014    Thoracic or lumbosacral neuritis or radiculitis, unspecified 2014    Acquired spondylolisthesis 2014    Spondylolysis, lumbar region 2014    Lumbago 2014    Lumbar radiculitis 2014    Ganglion cyst 2014    Osteoarthritis of lumbar spine 2014    Use of selective estrogen receptor modulators (SERMs) 2013    Migraine headache 2012    Anxiety 2012    Mild vitamin D deficiency 2012       PAST MEDICAL HISTORY:  Past Medical History:   Diagnosis Date    Anxiety     Cancer     Breast    Encounter for blood transfusion     Lumbar spondylosis     Migraine headache     Mild vitamin D deficiency        PAST SURGICAL HISTORY:  Past Surgical History:   Procedure Laterality Date    BREAST RECONSTRUCTION       SECTION       SECTION      MASTECTOMY      bilateral       SOCIAL HISTORY:  Social History     Social History    Marital status:      Spouse name: N/A    Number of children: N/A    Years of education: N/A     Occupational History    Not on file.     Social History Main Topics    Smoking status: Never Smoker    Smokeless tobacco: Never Used    Alcohol use Yes      Comment: occassionally    Drug use: No    Sexual activity: Yes     Partners: Male     Other Topics Concern    Are You Pregnant Or Think You May Be? No    Breast-Feeding No     Social History Narrative    No narrative on file       ALLERGIES AND MEDICATIONS: updated and reviewed.  Review of patient's allergies indicates:   Allergen Reactions    No known allergies      Current Outpatient Prescriptions   Medication Sig Dispense Refill    eszopiclone (LUNESTA) 2 MG Tab Take by mouth. 1 Tablet Oral At bedtime      letrozole (FEMARA) 2.5 mg Tab TAKE 1 TABLET (2.5 MG TOTAL) BY MOUTH ONCE DAILY. 90 tablet 1    rizatriptan (MAXALT) 10 MG tablet       clonazePAM (KLONOPIN) 0.5  "MG tablet Take 0.5 mg by mouth 2 (two) times daily.  0    ergocalciferol (ERGOCALCIFEROL) 50,000 unit Cap       esomeprazole (NEXIUM) 40 MG capsule TAKE ONE CAPSULE BY MOUTH EVERY MORNING BEFORE BREAKFAST 30 capsule 0    fluocinonide (LIDEX) 0.05 % external solution Apply topically once daily. To scalp 60 mL 3    hydrOXYzine (ATARAX) 25 MG tablet Take 25 mg by mouth 2 (two) times daily.  0    JUBLIA 10 % Gerardo APPLY TO NAILS EVERY DAY  3    ketoconazole (NIZORAL) 2 % shampoo Apply topically every other day. 120 mL 5    levocetirizine (XYZAL) 5 MG tablet Take 1 tablet (5 mg total) by mouth every evening. 30 tablet 0    lorcaserin 10 mg Tab Take 10 mg by mouth 2 (two) times daily. 60 tablet 2    trazodone (DESYREL) 150 MG tablet Take 150 mg by mouth nightly.   0    zolmitriptan (ZOMIG) 5 MG tablet   1     No current facility-administered medications for this visit.        Review of Systems   Constitutional: Positive for chills. Negative for fever.   Gastrointestinal: Positive for abdominal pain, diarrhea and nausea. Negative for blood in stool, constipation, melena and vomiting.       Physical Exam   Vitals:    11/10/17 1413   BP: 118/82   Pulse: 70   Temp: 98.4 °F (36.9 °C)   SpO2: 98%   Weight: 99.5 kg (219 lb 5.7 oz)   Height: 5' 6" (1.676 m)    Body mass index is 35.41 kg/m².  Weight: 99.5 kg (219 lb 5.7 oz)   Height: 5' 6" (167.6 cm)     Physical Exam   Constitutional: She is oriented to person, place, and time. She appears well-developed and well-nourished. No distress.   Eyes: EOM are normal.   Cardiovascular: Normal rate, regular rhythm and normal heart sounds.    No murmur heard.  Pulmonary/Chest: Effort normal and breath sounds normal.   Abdominal: Soft. She exhibits no distension and no mass. There is no tenderness. There is no rebound and no guarding.   Musculoskeletal: Normal range of motion. She exhibits edema.   1+ edema ankles bilaterally   Neurological: She is alert and oriented to person, " place, and time. Coordination normal.   Skin: Skin is warm and dry.   Psychiatric: She has a normal mood and affect. Her behavior is normal. Thought content normal.

## 2018-01-10 DIAGNOSIS — Z85.3 HX: BREAST CANCER: Chronic | ICD-10-CM

## 2018-01-10 RX ORDER — LETROZOLE 2.5 MG/1
2.5 TABLET, FILM COATED ORAL DAILY
Qty: 90 TABLET | Refills: 1 | Status: SHIPPED | OUTPATIENT
Start: 2018-01-10 | End: 2018-07-18 | Stop reason: SDUPTHER

## 2018-01-11 ENCOUNTER — OFFICE VISIT (OUTPATIENT)
Dept: INTERNAL MEDICINE | Facility: CLINIC | Age: 58
End: 2018-01-11
Payer: MEDICAID

## 2018-01-11 ENCOUNTER — IMMUNIZATION (OUTPATIENT)
Dept: INTERNAL MEDICINE | Facility: CLINIC | Age: 58
End: 2018-01-11
Payer: COMMERCIAL

## 2018-01-11 VITALS
DIASTOLIC BLOOD PRESSURE: 80 MMHG | BODY MASS INDEX: 36.5 KG/M2 | HEIGHT: 66 IN | SYSTOLIC BLOOD PRESSURE: 110 MMHG | HEART RATE: 77 BPM | WEIGHT: 227.13 LBS

## 2018-01-11 DIAGNOSIS — Z00.00 ROUTINE MEDICAL EXAM: Primary | ICD-10-CM

## 2018-01-11 DIAGNOSIS — Z23 NEED FOR PNEUMOCOCCAL VACCINE: ICD-10-CM

## 2018-01-11 DIAGNOSIS — Z23 INFLUENZA VACCINE NEEDED: ICD-10-CM

## 2018-01-11 DIAGNOSIS — Z83.3 FAMILY HISTORY OF TYPE 2 DIABETES MELLITUS: ICD-10-CM

## 2018-01-11 LAB
BILIRUB UR QL STRIP: NEGATIVE
CLARITY UR REFRACT.AUTO: CLEAR
COLOR UR AUTO: YELLOW
GLUCOSE UR QL STRIP: NEGATIVE
HGB UR QL STRIP: NEGATIVE
KETONES UR QL STRIP: NEGATIVE
LEUKOCYTE ESTERASE UR QL STRIP: ABNORMAL
MICROSCOPIC COMMENT: NORMAL
NITRITE UR QL STRIP: NEGATIVE
PH UR STRIP: 6 [PH] (ref 5–8)
PROT UR QL STRIP: NEGATIVE
RBC #/AREA URNS AUTO: 1 /HPF (ref 0–4)
SP GR UR STRIP: 1.01 (ref 1–1.03)
URN SPEC COLLECT METH UR: ABNORMAL
UROBILINOGEN UR STRIP-ACNC: NEGATIVE EU/DL
WBC #/AREA URNS AUTO: 3 /HPF (ref 0–5)

## 2018-01-11 PROCEDURE — 90471 IMMUNIZATION ADMIN: CPT | Mod: S$GLB,,, | Performed by: INTERNAL MEDICINE

## 2018-01-11 PROCEDURE — 99213 OFFICE O/P EST LOW 20 MIN: CPT | Mod: PBBFAC | Performed by: INTERNAL MEDICINE

## 2018-01-11 PROCEDURE — 90686 IIV4 VACC NO PRSV 0.5 ML IM: CPT | Mod: PBBFAC

## 2018-01-11 PROCEDURE — 90471 IMMUNIZATION ADMIN: CPT | Mod: PBBFAC

## 2018-01-11 PROCEDURE — 99396 PREV VISIT EST AGE 40-64: CPT | Mod: S$PBB,,, | Performed by: INTERNAL MEDICINE

## 2018-01-11 PROCEDURE — 99999 PR PBB SHADOW E&M-EST. PATIENT-LVL III: CPT | Mod: PBBFAC,,, | Performed by: INTERNAL MEDICINE

## 2018-01-11 PROCEDURE — 81001 URINALYSIS AUTO W/SCOPE: CPT

## 2018-01-14 NOTE — PROGRESS NOTES
Subjective:       Patient ID: Samuel Kilgore is a 57 y.o. female.    Chief Complaint: Annual Exam    Last seen 21 months ago. Returns for annual exam. Few somatic complaints which she attributes to depression and anxiety coping with a difficult marriage. But she would rather not take an antidepressant, took herself off Trazodone.     Past Medical History: .  Migraine headaches managed by Neurology on the [facility].  Situational stress and anxiety.  H/O Breast Cancer, Heme/Onc .    Vitamin D deficiency.    Lumbar spine DJD.   Sicca Syndrome, Rheumatology .    Past Surgical History:  x 1. Bilateral Mastectomy with reconstruction.     Pap normal . Colonoscopy entirely normal . BMD normal . Eye exam Spring 2017. Flu shot . Pneumovax 3/16. Labs 3/16: CBC normal, CMP normal except AST 71,  (stable), Lipase 27, TChol 201, TSH 2.37, HbA1c 5.2%, D-dimer negative, Vitamin D 40, Urinalysis clear, EKG and Chest x-ray normal, Exercise Stress Echo negative/normal LV function.    Social History: Never smoked, rare alcohol consumption.  with 3 children - only youngest is still at home, a high school sophomore. No longer working.     Family medical history: Positive for heart disease in elderly family members, diabetes, maternal grandfather had tuberculosis ( patient is PPD negative), paternal grandmother developed colon cancer in her 50s.    NKDA.     Medications: Nexium prn (3-4 per week), Maxalt prn (6-8 per month), Letrozole, Xyzal prn, Vitamin D 2,000 daily.             Review of Systems   Constitutional: Negative for activity change, appetite change, fatigue, fever and unexpected weight change.   HENT: Negative for congestion, hearing loss, rhinorrhea, sinus pressure, sneezing, sore throat, trouble swallowing and voice change.    Eyes: Negative for photophobia, pain and visual disturbance.   Respiratory: Negative for cough, chest tightness, shortness of breath and wheezing.  "   Cardiovascular: Negative for chest pain, palpitations and leg swelling.   Gastrointestinal: Positive for constipation, diarrhea and nausea. Negative for abdominal pain, anal bleeding, blood in stool and vomiting.        Chronic alternating diarrhea and constipation which she attributes to IBS. Mild nausea.    Genitourinary: Negative for difficulty urinating, dysuria, flank pain, frequency, hematuria and urgency.   Musculoskeletal: Negative for arthralgias, back pain, joint swelling, myalgias and neck pain.   Skin: Negative for color change and rash.   Neurological: Positive for light-headedness and headaches. Negative for dizziness, syncope, facial asymmetry, speech difficulty, weakness and numbness.   Hematological: Negative for adenopathy. Does not bruise/bleed easily.   Psychiatric/Behavioral: Positive for agitation and dysphoric mood. Negative for confusion, decreased concentration, sleep disturbance and suicidal ideas. The patient is nervous/anxious.        Objective:    /80, Pulse 77, Ht 5' 6", Wt 227 lbs (from 238), BMI=36.6  Physical Exam   Constitutional: She is oriented to person, place, and time. She appears well-developed and well-nourished. No distress.   HENT:   Head: Normocephalic and atraumatic.   Right Ear: External ear normal.   Left Ear: External ear normal.   Nose: Nose normal.   Mouth/Throat: Oropharynx is clear and moist. No oropharyngeal exudate.   Eyes: Conjunctivae and EOM are normal. Pupils are equal, round, and reactive to light. Right conjunctiva is not injected. Left conjunctiva is not injected. No scleral icterus.   Neck: Normal range of motion. Neck supple. No JVD present. Carotid bruit is not present. No thyromegaly present.   Cardiovascular: Normal rate, regular rhythm, normal heart sounds and intact distal pulses.  Exam reveals no gallop and no friction rub.    No murmur heard.  Pulmonary/Chest: Effort normal and breath sounds normal. No respiratory distress. She has no " wheezes. She has no rhonchi. She has no rales.   Abdominal: Soft. Bowel sounds are normal. She exhibits no distension and no mass. There is no hepatosplenomegaly. There is no tenderness. There is no CVA tenderness. No hernia.   Musculoskeletal: Normal range of motion. She exhibits no edema, tenderness or deformity.   Lymphadenopathy:     She has no cervical adenopathy.   Neurological: She is alert and oriented to person, place, and time. She has normal strength and normal reflexes. No cranial nerve deficit. She exhibits normal muscle tone. Coordination and gait normal.   Skin: Skin is warm and dry. No lesion and no rash noted. She is not diaphoretic. No cyanosis or erythema. No pallor. Nails show no clubbing.   Psychiatric: Judgment and thought content normal. Her affect is labile. Her speech is rapid and/or pressured. She is agitated. Cognition and memory are normal.   Vitals reviewed.      Assessment:       1. Routine medical exam    2. Family history of type 2 diabetes mellitus    3. Influenza vaccine needed    4. Need for pneumococcal vaccine        Plan:       Routine medical exam  -     CBC auto differential; Future; Expected date: 01/11/2018  -     Comprehensive metabolic panel; Future; Expected date: 01/11/2018  -     Lipid panel; Future; Expected date: 01/11/2018  -     Vitamin D; Future; Expected date: 01/11/2018  -     Urinalysis    Family history of type 2 diabetes mellitus  -     Hemoglobin A1c; Future; Expected date: 01/11/2018    Influenza vaccine needed        -     Flu shot today.    Need for pneumococcal vaccine  -     (In Office Administered) Pneumococcal Conjugate Vaccine (13 Valent) (IM)       Mood disorder - patient declines medication.

## 2018-01-20 ENCOUNTER — PATIENT MESSAGE (OUTPATIENT)
Dept: INTERNAL MEDICINE | Facility: CLINIC | Age: 58
End: 2018-01-20

## 2018-02-06 ENCOUNTER — LAB VISIT (OUTPATIENT)
Dept: LAB | Facility: HOSPITAL | Age: 58
End: 2018-02-06
Attending: INTERNAL MEDICINE
Payer: MEDICAID

## 2018-02-06 DIAGNOSIS — Z00.00 ROUTINE MEDICAL EXAM: ICD-10-CM

## 2018-02-06 DIAGNOSIS — Z83.3 FAMILY HISTORY OF TYPE 2 DIABETES MELLITUS: ICD-10-CM

## 2018-02-06 LAB
25(OH)D3+25(OH)D2 SERPL-MCNC: 30 NG/ML
ALBUMIN SERPL BCP-MCNC: 4.5 G/DL
ALP SERPL-CCNC: 89 U/L
ALT SERPL W/O P-5'-P-CCNC: 50 U/L
ANION GAP SERPL CALC-SCNC: 7 MMOL/L
AST SERPL-CCNC: 25 U/L
BASOPHILS # BLD AUTO: 0.06 K/UL
BASOPHILS NFR BLD: 0.9 %
BILIRUB SERPL-MCNC: 0.9 MG/DL
BUN SERPL-MCNC: 17 MG/DL
CALCIUM SERPL-MCNC: 9.9 MG/DL
CHLORIDE SERPL-SCNC: 104 MMOL/L
CHOLEST SERPL-MCNC: 237 MG/DL
CHOLEST/HDLC SERPL: 4.6 {RATIO}
CO2 SERPL-SCNC: 31 MMOL/L
CREAT SERPL-MCNC: 0.8 MG/DL
DIFFERENTIAL METHOD: NORMAL
EOSINOPHIL # BLD AUTO: 0.2 K/UL
EOSINOPHIL NFR BLD: 2.3 %
ERYTHROCYTE [DISTWIDTH] IN BLOOD BY AUTOMATED COUNT: 12.9 %
EST. GFR  (AFRICAN AMERICAN): >60 ML/MIN/1.73 M^2
EST. GFR  (NON AFRICAN AMERICAN): >60 ML/MIN/1.73 M^2
ESTIMATED AVG GLUCOSE: 103 MG/DL
GLUCOSE SERPL-MCNC: 113 MG/DL
HBA1C MFR BLD HPLC: 5.2 %
HCT VFR BLD AUTO: 39.6 %
HDLC SERPL-MCNC: 52 MG/DL
HDLC SERPL: 21.9 %
HGB BLD-MCNC: 13.9 G/DL
LDLC SERPL CALC-MCNC: 153.2 MG/DL
LYMPHOCYTES # BLD AUTO: 2.6 K/UL
LYMPHOCYTES NFR BLD: 40 %
MCH RBC QN AUTO: 30.8 PG
MCHC RBC AUTO-ENTMCNC: 35.1 G/DL
MCV RBC AUTO: 88 FL
MONOCYTES # BLD AUTO: 0.5 K/UL
MONOCYTES NFR BLD: 7.4 %
NEUTROPHILS # BLD AUTO: 3.3 K/UL
NEUTROPHILS NFR BLD: 49.4 %
NONHDLC SERPL-MCNC: 185 MG/DL
PLATELET # BLD AUTO: 222 K/UL
PMV BLD AUTO: 9.4 FL
POTASSIUM SERPL-SCNC: 4.2 MMOL/L
PROT SERPL-MCNC: 7.6 G/DL
RBC # BLD AUTO: 4.52 M/UL
SODIUM SERPL-SCNC: 142 MMOL/L
TRIGL SERPL-MCNC: 159 MG/DL
WBC # BLD AUTO: 6.6 K/UL

## 2018-02-06 PROCEDURE — 83036 HEMOGLOBIN GLYCOSYLATED A1C: CPT

## 2018-02-06 PROCEDURE — 80053 COMPREHEN METABOLIC PANEL: CPT

## 2018-02-06 PROCEDURE — 36415 COLL VENOUS BLD VENIPUNCTURE: CPT | Mod: PO

## 2018-02-06 PROCEDURE — 82306 VITAMIN D 25 HYDROXY: CPT

## 2018-02-06 PROCEDURE — 85025 COMPLETE CBC W/AUTO DIFF WBC: CPT

## 2018-02-06 PROCEDURE — 80061 LIPID PANEL: CPT

## 2018-02-18 ENCOUNTER — PATIENT MESSAGE (OUTPATIENT)
Dept: INTERNAL MEDICINE | Facility: CLINIC | Age: 58
End: 2018-02-18

## 2018-03-02 NOTE — PROGRESS NOTES
"Subjective:       Patient ID: Samuel Kilgore is a 57 y.o. female.    Chief Complaint: Malignant neoplasm of upper-inner quadrant of right female breast    HPI   Mrs. Kilgore returns today for followup for her adjuvant treatment of breast cancer. As of March 2015  she has been on letrozole in the extended adjuvant setting.  Her most recent DXA scan shown normal bone density of the hip and the L spine.    Briefly, she is a 56-year-old female status post bilateral mastectomies, status post adjuvant chemotherapy, currently on adjuvant tamoxifen for her stage II breast cancer (T1 N1 M0).   Her cancer was ER positive, [place] positive, and HER-2 negative. Her chemotherapy consisted of 4 cycles of dose-dense AC followed by four cycle of DD Taxol. She then took tamoxifen x 5 years.  She was started on letrozole in March 2016.    Review of Systems  Overall she feels good.  She wants to lose weight. She stopped her antidepressant because it was causing weight gain. She is not depressed but notes "situational depression" at times which is short lived. She wants to take GOLO which consists of  Banaba Leaf Extrac, Rhodiola Rosea, Zinc, Chromium to assist her in losing weight.   She has tolerated the letrozole well so far; she does report occasional hot flashes but overall they have decreased in severity over the years.  She denies any anxiety, easy bruising, fevers, chills, night sweats, weight loss, nausea, vomiting, diarrhea, constipation, diplopia, blurred vision, headache, chest pain, or abdominal pain.      Objective:      Physical Exam   GENERAL: She is alert, oriented to time, place, person, pleasant, well nourished, in no acute physical distress. No anxiety noted.  VITAL SIGNS: Reviewed.   HEENT: Normal. There are no nasal, oral, lip, gingival, auricular, lid, or conjunctival lesions. Mucosae are moist and pink, and there is no thrush. Pupils are equal, reactive to light and accommodation. Extraocular muscle movements are " intact.   NECK: Supple without JVD, adenopathy, or thyromegaly.   LUNGS: Clear to auscultation without wheezing, rales, or rhonchi.   CARDIOVASCULAR: Reveals an S1, S2, no murmurs, no rubs, and no gallops.   BREASTS: She is status post bilateral nipple-sparing mastectomies with no evidence of chest wall recurrence. There are no masses in either breast. There is no induration or tenderness in either breast.   ABDOMEN: Soft, obese, nontender, without organomegaly. Bowel sounds are present.   EXTREMITIES: No cyanosis, clubbing, or edema.   SKIN: Does not have petechiae, rashes, induration, or ecchymoses.   NEUROLOGIC: Motor function is 5/5, DTRs are 0-1+ bilaterally, symmetrical, and cranial nerves within normal.     Assessment:       1. Malignant neoplasm of axillary tail of right breast in female, estrogen receptor positive    2. Use of aromatase inhibitors    3. Spondylosis of lumbar region without myelopathy or radiculopathy    4. Anxiety        Plan:        I have asked her to remain on letrozole, which she will take through early March 2021.  RTC in 6 months to see Dr. Downey.  We will repeat her DXA scan in 12 months. Long discussion regarding oral supplements. I would not recommend she start GOLO. She understands that  we would like her to be on the least amount of medication and we are uncertain of the interaction GOLO may have on Letrozole. She verbalizes understanding and will start diet and exercise for weight loss.     Patient is in agreement with the proposed treatment plan. All questions were answered to the patient's satisfaction. Pt knows to call clinic if anything is needed before the next clinic visit.      NILES GuerreroP-C  Hematology & Oncology  [facility] [place] [address]  ph. [phone]  Fax. [phone]     30 minutes were spent in coordination of patient's care, record review and counseling. More than 50% of the time was face-to-face.         Distress Screening  Results: Psychosocial Distress screening score of Distress Score: 2 noted and reviewed. No intervention indicated.

## 2018-03-05 ENCOUNTER — OFFICE VISIT (OUTPATIENT)
Dept: HEMATOLOGY/ONCOLOGY | Facility: CLINIC | Age: 58
End: 2018-03-05
Payer: MEDICAID

## 2018-03-05 VITALS
BODY MASS INDEX: 37.36 KG/M2 | SYSTOLIC BLOOD PRESSURE: 162 MMHG | WEIGHT: 231.5 LBS | HEART RATE: 95 BPM | DIASTOLIC BLOOD PRESSURE: 93 MMHG | RESPIRATION RATE: 16 BRPM | TEMPERATURE: 98 F

## 2018-03-05 DIAGNOSIS — Z79.811 USE OF AROMATASE INHIBITORS: ICD-10-CM

## 2018-03-05 DIAGNOSIS — F41.9 ANXIETY: Chronic | ICD-10-CM

## 2018-03-05 DIAGNOSIS — Z17.0 MALIGNANT NEOPLASM OF AXILLARY TAIL OF RIGHT BREAST IN FEMALE, ESTROGEN RECEPTOR POSITIVE: Primary | ICD-10-CM

## 2018-03-05 DIAGNOSIS — C50.611 MALIGNANT NEOPLASM OF AXILLARY TAIL OF RIGHT BREAST IN FEMALE, ESTROGEN RECEPTOR POSITIVE: Primary | ICD-10-CM

## 2018-03-05 DIAGNOSIS — M47.816 SPONDYLOSIS OF LUMBAR REGION WITHOUT MYELOPATHY OR RADICULOPATHY: ICD-10-CM

## 2018-03-05 PROCEDURE — 99213 OFFICE O/P EST LOW 20 MIN: CPT | Mod: PBBFAC | Performed by: NURSE PRACTITIONER

## 2018-03-05 PROCEDURE — 99213 OFFICE O/P EST LOW 20 MIN: CPT | Mod: S$PBB,,, | Performed by: NURSE PRACTITIONER

## 2018-03-05 PROCEDURE — 99999 PR PBB SHADOW E&M-EST. PATIENT-LVL III: CPT | Mod: PBBFAC,,, | Performed by: NURSE PRACTITIONER

## 2018-05-07 ENCOUNTER — TELEPHONE (OUTPATIENT)
Dept: OBSTETRICS AND GYNECOLOGY | Facility: CLINIC | Age: 58
End: 2018-05-07

## 2018-05-07 NOTE — TELEPHONE ENCOUNTER
----- Message from Apolonia Xiao sent at 5/7/2018  1:58 PM CDT -----  Contact: self  Pt is calling in regards to scheduling an appt. Pt has an appt scheduled on 05/15, but would like to see Dr. Kinney instead.    Pt would like a call back at [phone].    Thank you

## 2018-05-15 ENCOUNTER — OFFICE VISIT (OUTPATIENT)
Dept: OBSTETRICS AND GYNECOLOGY | Facility: CLINIC | Age: 58
End: 2018-05-15
Payer: MEDICAID

## 2018-05-15 VITALS
WEIGHT: 228.81 LBS | BODY MASS INDEX: 38.12 KG/M2 | HEIGHT: 65 IN | DIASTOLIC BLOOD PRESSURE: 80 MMHG | SYSTOLIC BLOOD PRESSURE: 138 MMHG

## 2018-05-15 DIAGNOSIS — Z01.419 WELL WOMAN EXAM WITH ROUTINE GYNECOLOGICAL EXAM: Primary | ICD-10-CM

## 2018-05-15 PROBLEM — G47.33 OBSTRUCTIVE SLEEP APNEA SYNDROME: Status: ACTIVE | Noted: 2018-05-15

## 2018-05-15 PROBLEM — E53.9 VITAMIN B DEFICIENCY: Status: ACTIVE | Noted: 2018-05-15

## 2018-05-15 PROBLEM — G25.81 RESTLESS LEGS: Status: ACTIVE | Noted: 2018-05-15

## 2018-05-15 PROBLEM — E55.9 VITAMIN D DEFICIENCY: Status: ACTIVE | Noted: 2018-05-15

## 2018-05-15 PROBLEM — E55.9 VITAMIN D DEFICIENCY: Status: RESOLVED | Noted: 2018-05-15 | Resolved: 2018-05-15

## 2018-05-15 PROCEDURE — 99999 PR PBB SHADOW E&M-EST. PATIENT-LVL II: CPT | Mod: PBBFAC,,, | Performed by: STUDENT IN AN ORGANIZED HEALTH CARE EDUCATION/TRAINING PROGRAM

## 2018-05-15 PROCEDURE — 99212 OFFICE O/P EST SF 10 MIN: CPT | Mod: PBBFAC,PO | Performed by: STUDENT IN AN ORGANIZED HEALTH CARE EDUCATION/TRAINING PROGRAM

## 2018-05-15 PROCEDURE — 99396 PREV VISIT EST AGE 40-64: CPT | Mod: S$PBB,,, | Performed by: STUDENT IN AN ORGANIZED HEALTH CARE EDUCATION/TRAINING PROGRAM

## 2018-05-15 NOTE — PROGRESS NOTES
SUBJECTIVE:   57 y.o. female No obstetric history on file.  for annual routine Pap and checkup. No LMP recorded. Patient is postmenopausal..  She complains of dyspareunia for several months.  Has tried some water based lubricants without success.        Past Medical History:   Diagnosis Date    Anxiety     Cancer     Breast    Encounter for blood transfusion     Lumbar spondylosis     Migraine headache     Mild vitamin D deficiency      Past Surgical History:   Procedure Laterality Date    BREAST RECONSTRUCTION       SECTION      MASTECTOMY      bilateral     Social History     Social History    Marital status:      Spouse name: N/A    Number of children: N/A    Years of education: N/A     Occupational History    Not on file.     Social History Main Topics    Smoking status: Never Smoker    Smokeless tobacco: Never Used    Alcohol use Yes      Comment: occassionally    Drug use: No    Sexual activity: Yes     Partners: Male     Other Topics Concern    Are You Pregnant Or Think You May Be? No    Breast-Feeding No     Social History Narrative    No narrative on file     Family History   Problem Relation Age of Onset    Heart disease Maternal Grandfather     Diabetes Maternal Grandfather     Colon cancer Paternal Grandmother     Melanoma Paternal Grandmother     Allergies Child     Asthma Child     Cancer Neg Hx     Miscarriages / Stillbirths Neg Hx     Psoriasis Neg Hx     Lupus Neg Hx     Eczema Neg Hx     Acne Neg Hx      OB History   No data available         Current Outpatient Prescriptions   Medication Sig Dispense Refill    letrozole (FEMARA) 2.5 mg Tab TAKE 1 TABLET (2.5 MG TOTAL) BY MOUTH ONCE DAILY. 90 tablet 1    rizatriptan (MAXALT) 10 MG tablet       esomeprazole (NEXIUM) 40 MG capsule TAKE ONE CAPSULE BY MOUTH EVERY MORNING BEFORE BREAKFAST 30 capsule 0    levocetirizine (XYZAL) 5 MG tablet Take 1 tablet (5 mg total) by mouth every evening. 30 tablet 0  "    No current facility-administered medications for this visit.      Allergies: No known allergies     ROS:  Constitutional: no weight loss, weight gain, fever, fatigue  Eyes:  No vision changes, glasses/contacts  ENT/Mouth: No ulcers, sinus problems, ears ringing, headache  Cardiovascular: No inability to lie flat, chest pain, exercise intolerance, swelling, heart palpitations  Respiratory: No wheezing, coughing blood, shortness of breath, or cough  Gastrointestinal: No diarrhea, bloody stool, nausea/vomiting, constipation, gas, hemorrhoids  Genitourinary: No blood in urine, painful urination, urgency of urination, frequency of urination, incomplete emptying, incontinence, abnormal bleeding, painful periods, heavy periods, vaginal discharge, vaginal odor, painful intercourse, sexual problems, bleeding after intercourse.  Musculoskeletal: No muscle weakness  Skin/Breast: No painful breasts, nipple discharge, masses, rash, ulcers  Neurological: No passing out, seizures, numbness, headache  Endocrine: No diabetes, hypothyroid, hyperthyroid, hot flashes, hair loss, abnormal hair growth, ance  Psychiatric: No depression, crying  Hematologic: No bruises, bleeding, swollen lymph nodes, anemia.      OBJECTIVE:   The patient appears well, alert, oriented x 3, in no distress.  /80   Ht 5' 5" (1.651 m)   Wt 103.8 kg (228 lb 13.4 oz)   BMI 38.08 kg/m²   NECK: no thyromegaly, trachea midline  SKIN: no acne, striae, hirsutism  BREAST EXAM: breasts appear normal, no suspicious masses, no skin or nipple changes or axillary nodes  ABDOMEN: no hernias, masses, or hepatosplenomegaly  GENITALIA: normal external genitalia, no erythema, no discharge  URETHRA: normal urethra, normal urethral meatus  VAGINA: mucosal atrophy  CERVIX: no lesions or cervical motion tenderness  UTERUS: normal size, contour, position, consistency, mobility, non-tender  ADNEXA: no mass, fullness, tenderness    \  ASSESSMENT:   .Samuel was seen today " for painful intercourse.    Diagnoses and all orders for this visit:    Well woman exam with routine gynecological exam    vaginal atrophy - will try silicone based lubricants and oils.  If no relief would try vagifem 10

## 2018-05-28 ENCOUNTER — OFFICE VISIT (OUTPATIENT)
Dept: INTERNAL MEDICINE | Facility: CLINIC | Age: 58
End: 2018-05-28
Payer: MEDICAID

## 2018-05-28 VITALS
TEMPERATURE: 99 F | HEART RATE: 74 BPM | SYSTOLIC BLOOD PRESSURE: 104 MMHG | BODY MASS INDEX: 38.57 KG/M2 | DIASTOLIC BLOOD PRESSURE: 74 MMHG | WEIGHT: 231.5 LBS | HEIGHT: 65 IN

## 2018-05-28 DIAGNOSIS — F51.01 PRIMARY INSOMNIA: Primary | ICD-10-CM

## 2018-05-28 DIAGNOSIS — H81.10 BENIGN PAROXYSMAL VERTIGO, UNSPECIFIED LATERALITY: ICD-10-CM

## 2018-05-28 DIAGNOSIS — G43.009 MIGRAINE WITHOUT AURA AND WITHOUT STATUS MIGRAINOSUS, NOT INTRACTABLE: ICD-10-CM

## 2018-05-28 DIAGNOSIS — G47.33 OSA ON CPAP: ICD-10-CM

## 2018-05-28 PROCEDURE — 99213 OFFICE O/P EST LOW 20 MIN: CPT | Mod: PBBFAC | Performed by: INTERNAL MEDICINE

## 2018-05-28 PROCEDURE — 99214 OFFICE O/P EST MOD 30 MIN: CPT | Mod: S$PBB,,, | Performed by: INTERNAL MEDICINE

## 2018-05-28 PROCEDURE — 99999 PR PBB SHADOW E&M-EST. PATIENT-LVL III: CPT | Mod: PBBFAC,,, | Performed by: INTERNAL MEDICINE

## 2018-05-28 RX ORDER — ESZOPICLONE 2 MG/1
2 TABLET, FILM COATED ORAL NIGHTLY PRN
Qty: 30 TABLET | Refills: 1 | Status: SHIPPED | OUTPATIENT
Start: 2018-05-28 | End: 2018-09-10

## 2018-05-28 RX ORDER — MECLIZINE HCL 12.5 MG 12.5 MG/1
12.5 TABLET ORAL 3 TIMES DAILY PRN
Qty: 30 TABLET | Refills: 1 | Status: SHIPPED | OUTPATIENT
Start: 2018-05-28

## 2018-05-29 NOTE — PROGRESS NOTES
Subjective:       Patient ID: Samuel Kilgore is a 57 y.o. female.    Chief Complaint: Insomnia; Dizziness; and Nausea    Last seen for annual exam four months ago. Returns urgently c/o Insomnia chronic and worsening. And Dizziness with nausea. She has sleep onset insomnia and may lie awake as long as 3 hours unable to fall asleep. Then falls asleep for 2 hours and wakes unable to fall back asleep. Denies any physical discomfort disturbing rest. She is exhausted, and depressed by inability to sleep. Having more migraine headaches. And dizziness described as a sudden sensation of movement upon turning her head, with intense spinning sensation and nausea. She has tried Melatonin with little relief. Currently using CPAP with nasal cannula and thinks it isn't working.     Past Medical History: .  Migraine headaches managed by Neurology on the [facility].  Situational stress and anxiety.  H/O Breast Cancer, Heme/Onc 3/18.    Vitamin D deficiency.    Lumbar spine DJD.   Sicca Syndrome, Rheumatology .    Past Surgical History:  x 1. Bilateral Mastectomy with reconstruction.     Pap normal , Pelvic exam . Colonoscopy entirely normal . BMD normal . Eye exam Spring 2017. Pneumovax 3/16. Flu shot and Prevnar . Labs : CBC and CMP normal, HbA1c 5.2%, Vit D 30, TChol 237, , HDL 52, , Urinalysis clear.     Social History: Never smoked, rare alcohol consumption.  with 3 children - only youngest is still at home, a high school sophomore. No longer working.     Family medical history: Positive for heart disease in elderly family members, diabetes, maternal grandfather had tuberculosis ( patient is PPD negative), paternal grandmother developed colon cancer in her 50s.    NKDA.     Medications: list reviewed and reconciled.             Review of Systems   Constitutional: Negative for chills and fever.   Eyes: Negative for photophobia and visual disturbance.   Respiratory:  "Negative for cough and shortness of breath.    Cardiovascular: Negative for chest pain, palpitations and leg swelling.   Gastrointestinal: Negative for abdominal pain, diarrhea and vomiting.   Musculoskeletal: Negative for arthralgias and myalgias.   Neurological: Negative for seizures, syncope, facial asymmetry, speech difficulty and weakness.       Objective:    /74, Pulse 74, Ht 5' 5", Wt 231.5 lbs  Physical Exam   Constitutional: She is oriented to person, place, and time. She appears well-developed and well-nourished. No distress.   HENT:   Head: Normocephalic and atraumatic.   Right Ear: External ear normal.   Left Ear: External ear normal.   Nose: Nose normal.   Mouth/Throat: Oropharynx is clear and moist.   Eyes: Conjunctivae and EOM are normal.   Neck: Normal range of motion. Neck supple.   Cardiovascular: Normal rate, regular rhythm and normal heart sounds.    Pulmonary/Chest: Effort normal and breath sounds normal. No respiratory distress. She has no wheezes. She has no rales.   Abdominal: Soft. Bowel sounds are normal. She exhibits no distension. There is no tenderness.   Musculoskeletal: Normal range of motion. She exhibits no edema.   Neurological: She is alert and oriented to person, place, and time. No cranial nerve deficit. Coordination normal.   Skin: Skin is warm and dry. She is not diaphoretic.   Psychiatric: She has a normal mood and affect. Her behavior is normal.       Assessment:       1. Primary insomnia    2. Benign paroxysmal vertigo, unspecified laterality    3. Migraine without aura and without status migrainosus, not intractable    4. RAYMOND on CPAP        Plan:       Primary insomnia  -     eszopiclone (LUNESTA) 2 MG Tab; Take 1 tablet (2 mg total) by mouth nightly as needed.  Dispense: 30 tablet; Refill: 1    Benign paroxysmal vertigo, unspecified laterality  -     meclizine (ANTIVERT) 12.5 mg tablet; Take 1 tablet (12.5 mg total) by mouth 3 (three) times daily as needed for " Dizziness.  Dispense: 30 tablet; Refill: 1    Migraine without aura and without status migrainosus, not intractable - Maxalt as needed.    RAYMOND on CPAP  -     CPAP/BIPAP SUPPLIES - nasal mask ordered.

## 2018-06-28 ENCOUNTER — TELEPHONE (OUTPATIENT)
Dept: INTERNAL MEDICINE | Facility: CLINIC | Age: 58
End: 2018-06-28

## 2018-06-28 NOTE — TELEPHONE ENCOUNTER
----- Message from Sara Caldwell sent at 6/28/2018 10:34 AM CDT -----  Contact: pt  States they have there own prescription for her cpap mask and she just needs it to be signed. Please call pt at [phone]. Thank you

## 2018-07-18 ENCOUNTER — TELEPHONE (OUTPATIENT)
Dept: INTERNAL MEDICINE | Facility: CLINIC | Age: 58
End: 2018-07-18

## 2018-07-18 DIAGNOSIS — Z85.3 HX: BREAST CANCER: Chronic | ICD-10-CM

## 2018-07-18 NOTE — TELEPHONE ENCOUNTER
----- Message from Socorro Matthew sent at 7/18/2018  3:25 PM CDT -----  Contact: [facility] [phone]  Prescription Alternative Needed:     The pharmacy needs alternative on the following RX:    eszopiclone (LUNESTA) 2 MG Tab    Reason: Drug not covered.     Pharmacy: [facility]/pharmacy #[id number] - [place] - [address] AT [address]    Please advise.    Thank You

## 2018-07-19 RX ORDER — LETROZOLE 2.5 MG/1
2.5 TABLET, FILM COATED ORAL DAILY
Qty: 90 TABLET | Refills: 1 | Status: SHIPPED | OUTPATIENT
Start: 2018-07-19 | End: 2019-02-04 | Stop reason: SDUPTHER

## 2018-09-10 ENCOUNTER — OFFICE VISIT (OUTPATIENT)
Dept: HEMATOLOGY/ONCOLOGY | Facility: CLINIC | Age: 58
End: 2018-09-10
Payer: MEDICAID

## 2018-09-10 VITALS
BODY MASS INDEX: 38.72 KG/M2 | TEMPERATURE: 98 F | SYSTOLIC BLOOD PRESSURE: 140 MMHG | WEIGHT: 232.38 LBS | OXYGEN SATURATION: 99 % | HEART RATE: 78 BPM | HEIGHT: 65 IN | RESPIRATION RATE: 16 BRPM | DIASTOLIC BLOOD PRESSURE: 95 MMHG

## 2018-09-10 DIAGNOSIS — Z79.811 PROPHYLACTIC USE OF LETROZOLE (FEMARA): ICD-10-CM

## 2018-09-10 DIAGNOSIS — C50.611 MALIGNANT NEOPLASM OF AXILLARY TAIL OF RIGHT BREAST IN FEMALE, ESTROGEN RECEPTOR POSITIVE: ICD-10-CM

## 2018-09-10 DIAGNOSIS — Z17.0 MALIGNANT NEOPLASM OF AXILLARY TAIL OF RIGHT BREAST IN FEMALE, ESTROGEN RECEPTOR POSITIVE: ICD-10-CM

## 2018-09-10 DIAGNOSIS — Z79.810 USE OF SELECTIVE ESTROGEN RECEPTOR MODULATORS (SERMS): Primary | ICD-10-CM

## 2018-09-10 PROCEDURE — 99999 PR PBB SHADOW E&M-EST. PATIENT-LVL III: CPT | Mod: PBBFAC,,, | Performed by: INTERNAL MEDICINE

## 2018-09-10 PROCEDURE — 99213 OFFICE O/P EST LOW 20 MIN: CPT | Mod: PBBFAC | Performed by: INTERNAL MEDICINE

## 2018-09-10 PROCEDURE — 99214 OFFICE O/P EST MOD 30 MIN: CPT | Mod: S$PBB,,, | Performed by: INTERNAL MEDICINE

## 2018-09-10 RX ORDER — CYANOCOBALAMIN (VITAMIN B-12) 500 MCG
TABLET ORAL
COMMUNITY

## 2018-09-10 RX ORDER — HYDROXYZINE HYDROCHLORIDE 10 MG/1
25 TABLET, FILM COATED ORAL DAILY
COMMUNITY
End: 2019-04-23 | Stop reason: SDUPTHER

## 2018-09-10 NOTE — PROGRESS NOTES
Subjective:       Patient ID: Samuel Kilgore is a 57 y.o. female.    Chief Complaint: No chief complaint on file.    HPI   Mrs. Kilgore returns today for followup for her adjuvant treatment of breast cancer. As of March 2015  she has been on letrozole in the extended adjuvant setting.  Her most recent DXA scan ahd shown normal bone density of the hip and the L spine.    Briefly, she is a 56-year-old female status post bilateral mastectomies, status post adjuvant chemotherapy, currently on adjuvant letrozole for her stage II breast cancer (T1 N1 M0).   Her cancer was ER positive, AL positive, and HER-2 negative. Her chemotherapy consisted of 4 cycles of dose-dense AC followed by four cycle of DD Taxol. She then took tamoxifen x 5 years.  She was started on letrozole in March 2016.    Review of Systems  Overall she feels OK.  She has tolerated the letrozole well so far, hwoever, she does complain of intermittent RLE edema which is painful at times, and also of increased hair loss.  She denies any anxiety, easy bruising, fevers, chills, night sweats, weight loss, nausea, vomiting, diarrhea, constipation, diplopia, blurred vision, headache, chest pain, or abdominal pain.    Objective:      Physical Exam   GENERAL: She is alert, oriented to time, place, person, pleasant, well nourished, in no acute physical distress.   VITAL SIGNS: Reviewed.   HEENT: Normal. There are no nasal, oral, lip, gingival, auricular, lid, or conjunctival lesions. Mucosae are moist and pink, and there is no thrush. Pupils are equal, reactive to light and accommodation. Extraocular muscle movements are intact.   NECK: Supple without JVD, adenopathy, or thyromegaly.   LUNGS: Clear to auscultation without wheezing, rales, or rhonchi.   CARDIOVASCULAR: Reveals an S1, S2, no murmurs, no rubs, and no gallops.   BREASTS: She is status post bilateral nipple-sparing mastectomies with no evidence of chest wall recurrence. There are no masses in either  breast. There is no induration or tenderness in either breast.   ABDOMEN: Soft, obese, nontender, without organomegaly. Bowel sounds are present.   EXTREMITIES: No cyanosis, clubbing, or edema.   SKIN: Does not have petechiae, rashes, induration, or ecchymoses.   NEUROLOGIC: Motor function is 5/5, DTRs are 0-1+ bilaterally, symmetrical, and cranial nerves within normal.     Assessment:       1. Malignant neoplasm of axillary tail of right breast in female, estrogen receptor positive    2. Adjuvant use of letrozole (Femara)   3  hair loss of recent onset (per patient).     4     Intermittent RLE edema and pain.  DVt will need to be ruled out.  Plan:          In regards to her RLE pain and edema, she will proceed with an ultrasound ASAP to rule out a DVT.  In regards to the hair loss, we will obtain TFTs and a B12 level.   In regards to her stage II cancer, I have asked her to remain on letrozole, which she will take through early March 2021.  I will see her again in 6 months.  We will repeat her DXA scan in 12 months.  Her questions were answered to her satisfaction.

## 2018-09-11 ENCOUNTER — TELEPHONE (OUTPATIENT)
Dept: HEMATOLOGY/ONCOLOGY | Facility: CLINIC | Age: 58
End: 2018-09-11

## 2018-09-11 NOTE — TELEPHONE ENCOUNTER
----- Message from Rojas Edmonds MD sent at 9/10/2018 10:57 AM CDT -----  Labs today.  Doppler ultrasound ASAP, preferably in the [facility] facility

## 2018-09-13 ENCOUNTER — HOSPITAL ENCOUNTER (OUTPATIENT)
Dept: RADIOLOGY | Facility: HOSPITAL | Age: 58
Discharge: HOME | End: 2018-09-13
Attending: INTERNAL MEDICINE
Payer: MEDICAID

## 2018-09-13 DIAGNOSIS — Z79.811 PROPHYLACTIC USE OF LETROZOLE (FEMARA): ICD-10-CM

## 2018-09-13 PROCEDURE — 93971 EXTREMITY STUDY: CPT | Mod: TC

## 2018-10-18 ENCOUNTER — PATIENT MESSAGE (OUTPATIENT)
Dept: INTERNAL MEDICINE | Facility: CLINIC | Age: 58
End: 2018-10-18

## 2018-10-18 DIAGNOSIS — D22.9 MULTIPLE NEVI: ICD-10-CM

## 2018-10-18 DIAGNOSIS — L65.9 HAIR LOSS: Primary | ICD-10-CM

## 2018-10-30 NOTE — TELEPHONE ENCOUNTER
Please inform patient that the referral is ordered, it is internal - so no printed orders for faxing.

## 2018-11-08 ENCOUNTER — TELEPHONE (OUTPATIENT)
Dept: HEMATOLOGY/ONCOLOGY | Facility: CLINIC | Age: 58
End: 2018-11-08

## 2018-11-08 ENCOUNTER — OFFICE VISIT (OUTPATIENT)
Dept: INTERNAL MEDICINE | Facility: CLINIC | Age: 58
End: 2018-11-08
Payer: MEDICAID

## 2018-11-08 VITALS
SYSTOLIC BLOOD PRESSURE: 110 MMHG | OXYGEN SATURATION: 97 % | BODY MASS INDEX: 37.9 KG/M2 | WEIGHT: 227.5 LBS | HEIGHT: 65 IN | TEMPERATURE: 97 F | HEART RATE: 71 BPM | DIASTOLIC BLOOD PRESSURE: 80 MMHG

## 2018-11-08 DIAGNOSIS — Z85.3 HISTORY OF BREAST CANCER: ICD-10-CM

## 2018-11-08 DIAGNOSIS — K21.9 GASTROESOPHAGEAL REFLUX DISEASE, ESOPHAGITIS PRESENCE NOT SPECIFIED: ICD-10-CM

## 2018-11-08 DIAGNOSIS — E55.9 MILD VITAMIN D DEFICIENCY: Chronic | ICD-10-CM

## 2018-11-08 DIAGNOSIS — G47.33 OBSTRUCTIVE SLEEP APNEA SYNDROME: ICD-10-CM

## 2018-11-08 DIAGNOSIS — G43.909 MIGRAINE WITHOUT STATUS MIGRAINOSUS, NOT INTRACTABLE, UNSPECIFIED MIGRAINE TYPE: Primary | Chronic | ICD-10-CM

## 2018-11-08 DIAGNOSIS — N64.4 BREAST PAIN, LEFT: ICD-10-CM

## 2018-11-08 PROCEDURE — 99214 OFFICE O/P EST MOD 30 MIN: CPT | Mod: S$PBB,,, | Performed by: PHYSICIAN ASSISTANT

## 2018-11-08 PROCEDURE — 99214 OFFICE O/P EST MOD 30 MIN: CPT | Mod: PBBFAC,PO | Performed by: PHYSICIAN ASSISTANT

## 2018-11-08 PROCEDURE — 99999 PR PBB SHADOW E&M-EST. PATIENT-LVL IV: CPT | Mod: PBBFAC,,, | Performed by: PHYSICIAN ASSISTANT

## 2018-11-08 RX ORDER — RIZATRIPTAN BENZOATE 10 MG/1
10 TABLET ORAL ONCE AS NEEDED
Qty: 30 TABLET | Refills: 0 | Status: SHIPPED | OUTPATIENT
Start: 2018-11-08 | End: 2018-11-08

## 2018-11-08 RX ORDER — ESOMEPRAZOLE MAGNESIUM 40 MG/1
CAPSULE, DELAYED RELEASE ORAL
Qty: 30 CAPSULE | Refills: 3 | Status: SHIPPED | OUTPATIENT
Start: 2018-11-08 | End: 2021-10-19

## 2018-11-08 NOTE — TELEPHONE ENCOUNTER
----- Message from Maulik Quinteros sent at 11/8/2018 12:51 PM CST -----  Contact: pt   Pt would like a call back regarding having pain in left breast pain level currently 4 insisted on message being sent       Pt can be reached at [phone]

## 2018-11-08 NOTE — TELEPHONE ENCOUNTER
Returned call to patient to discuss her current symptoms. Patient reported having pain that has been present for past several weeks to month. Pain is of a burning/aching nature that is severe. Patient recently had boil present underneath left breast that appears to be near healed (unsure if this is probable cause). Patient seen JYOTI with family medicine who recommended she follow up for appointment with us. Ordered mammogram on her last visit, however patient with hx of bilateral mastectomies and reconstructive surgery. Patient scheduled to see us on Monday 11/12 at 1100 am. Pt agreed and appointment confirmed.

## 2018-11-08 NOTE — PROGRESS NOTES
"Subjective:       Patient ID: Samuel Kilgore is a 57 y.o. female.    Chief Complaint: Medication Refill and Breast Pain (L)    57 year old female presents to clinic for refill of Maxalt and Nexium. Pt is new to me - relocated to  and is going to [facility] care with new PCP Feb 2019. She reports seeing neurology for many years and is not interested in seeing neuro in  at this time. She reports migraines are stable. Pt reports hx breast cancer - status post bilateral mastectomies, status post adjuvant chemotherapy, currently on adjuvant letrozole for her stage II breast cancer. Pt sees hem/onc in [place]. She reports intermittent aching diffuse discomfort in L breast over the last few weeks with occasional "shooting" pain in center of L breast. She reports no skin changes, drainage, open wounds, rash, fever, chills, N/V, CP, SOB, exertional sxs, or other medical complaints.    PCP: Dr. Muñoz - establishing with Dr. Dheeraj blake    Patient Active Problem List:     Migraine headache     Anxiety     Mild vitamin D deficiency     Osteoarthritis of lumbar spine     Ganglion cyst     Spondylosis without myelopathy     Thoracic or lumbosacral neuritis or radiculitis, unspecified     Lumbago     Malignant neoplasm of right breast     Obstructive sleep apnea syndrome     Restless legs     Vitamin B deficiency     Prophylactic use of letrozole (Femara)      Review of Systems   Constitutional: Negative for chills and fever.   Respiratory: Negative for cough and shortness of breath.    Cardiovascular: Negative for chest pain, palpitations and leg swelling.   Gastrointestinal: Negative for nausea and vomiting.   Skin: Negative for rash.   Neurological: Negative for dizziness, weakness, numbness and headaches.   Psychiatric/Behavioral: Negative for confusion.       Objective:       Vitals:    11/08/18 1040   BP: 110/80   BP Location: Right arm   Patient Position: Sitting   BP Method: Large (Manual)   Pulse: 71   Temp: 97.2 " "°F (36.2 °C)   TempSrc: Tympanic   SpO2: 97%   Weight: 103.2 kg (227 lb 8.2 oz)   Height: 5' 5" (1.651 m)       Physical Exam   Constitutional: She is oriented to person, place, and time. She appears well-developed and well-nourished. No distress.   HENT:   Head: Normocephalic and atraumatic.   Eyes: EOM are normal. No scleral icterus.   Neck: Neck supple.   Cardiovascular: Normal rate and regular rhythm.   Pulmonary/Chest: Effort normal and breath sounds normal. No stridor. No respiratory distress. She has no decreased breath sounds. She has no wheezes. She has no rhonchi. She has no rales. Right breast exhibits no mass, no skin change and no tenderness. Left breast exhibits no mass, no skin change and no tenderness.   Musculoskeletal: Normal range of motion.   Lymphadenopathy:     She has no axillary adenopathy.   Neurological: She is alert and oriented to person, place, and time. No cranial nerve deficit.   Skin: Skin is warm and dry. No rash noted.   Psychiatric: She has a normal mood and affect. Her speech is normal and behavior is normal. Thought content normal.       Component      Latest Ref Rng & Units 2/6/2018   WBC      3.90 - 12.70 K/uL 6.60   RBC      4.00 - 5.40 M/uL 4.52   Hemoglobin      12.0 - 16.0 g/dL 13.9   Hematocrit      37.0 - 48.5 % 39.6   MCV      82 - 98 fL 88   MCH      27.0 - 31.0 pg 30.8   MCHC      32.0 - 36.0 g/dL 35.1   RDW      11.5 - 14.5 % 12.9   Platelets      150 - 350 K/uL 222   MPV      9.2 - 12.9 fL 9.4   Gran # (ANC)      1.8 - 7.7 K/uL 3.3   Lymph #      1.0 - 4.8 K/uL 2.6   Mono #      0.3 - 1.0 K/uL 0.5   Eos #      0.0 - 0.5 K/uL 0.2   Baso #      0.00 - 0.20 K/uL 0.06   Gran%      38.0 - 73.0 % 49.4   Lymph%      18.0 - 48.0 % 40.0   Mono%      4.0 - 15.0 % 7.4   Eosinophil%      0.0 - 8.0 % 2.3   Basophil%      0.0 - 1.9 % 0.9   Differential Method       Automated   Sodium      136 - 145 mmol/L 142   Potassium      3.5 - 5.1 mmol/L 4.2   Chloride      95 - 110 mmol/L " 104   CO2      23 - 29 mmol/L 31 (H)   Glucose      70 - 110 mg/dL 113 (H)   BUN, Bld      6 - 20 mg/dL 17   Creatinine      0.5 - 1.4 mg/dL 0.8   Calcium      8.7 - 10.5 mg/dL 9.9   Total Protein      6.0 - 8.4 g/dL 7.6   Albumin      3.5 - 5.2 g/dL 4.5   Total Bilirubin      0.1 - 1.0 mg/dL 0.9   Alkaline Phosphatase      55 - 135 U/L 89   AST      10 - 40 U/L 25   ALT      10 - 44 U/L 50 (H)   Anion Gap      8 - 16 mmol/L 7 (L)   eGFR if African American      >60 mL/min/1.73 m:2 >60   eGFR if non African American      >60 mL/min/1.73 m:2 >60   Cholesterol      120 - 199 mg/dL 237 (H)   Triglycerides      30 - 150 mg/dL 159 (H)   HDL      40 - 75 mg/dL 52   LDL Cholesterol      63.0 - 159.0 mg/dL 153.2   HDL/Chol Ratio      20.0 - 50.0 % 21.9   Total Cholesterol/HDL Ratio      2.0 - 5.0 4.6   Non-HDL Cholesterol      mg/dL 185   Hemoglobin A1C      4.0 - 5.6 % 5.2   Estimated Avg Glucose      68 - 131 mg/dL 103   Vit D, 25-Hydroxy      30 - 96 ng/mL 30     Assessment:       1. Migraine without status migrainosus, not intractable, unspecified migraine type    2. Mild vitamin D deficiency    3. Breast pain, left    4. History of breast cancer    5. Gastroesophageal reflux disease, esophagitis presence not specified    6. Obstructive sleep apnea syndrome        Plan:         Samuel was seen today for medication refill and breast pain.    Diagnoses and all orders for this visit:    Migraine without status migrainosus, not intractable, unspecified migraine type  -     rizatriptan (MAXALT) 10 MG tablet; Take 1 tablet (10 mg total) by mouth once as needed for Migraine.  Maxalt refilled per pt request. F/u with PCP / neuro for further management.    Mild vitamin D deficiency  Controlled.    Breast pain, left; History of breast cancer  -     Ambulatory referral to Hematology / Oncology  -     Mammo Digital Diagnostic Left; Future  -     US Breast Left Complete; Future  Pt s/p bilateral mastectomies, status post adjuvant  chemotherapy, currently on adjuvant letrozole. Unable to schedule pt with specialist soon. Diag mmg and U/S with result review following. Pt to also inform her hem/onc physician of sxs.    Gastroesophageal reflux disease, esophagitis presence not specified  -     esomeprazole (NEXIUM) 40 MG capsule; TAKE ONE CAPSULE BY MOUTH EVERY MORNING BEFORE BREAKFAST  Nexium refilled per pt request.    Obstructive sleep apnea syndrome  Pt uses CPAP.    Flu shot today. Pt declines TDaP at this time. F/u with PCP for health management.

## 2018-11-12 ENCOUNTER — OFFICE VISIT (OUTPATIENT)
Dept: HEMATOLOGY/ONCOLOGY | Facility: CLINIC | Age: 58
End: 2018-11-12
Payer: MEDICAID

## 2018-11-12 ENCOUNTER — HOSPITAL ENCOUNTER (OUTPATIENT)
Dept: RADIOLOGY | Facility: HOSPITAL | Age: 58
Discharge: HOME | End: 2018-11-12
Attending: INTERNAL MEDICINE
Payer: MEDICAID

## 2018-11-12 ENCOUNTER — OFFICE VISIT (OUTPATIENT)
Dept: URGENT CARE | Facility: CLINIC | Age: 58
End: 2018-11-12
Payer: MEDICAID

## 2018-11-12 VITALS
RESPIRATION RATE: 17 BRPM | OXYGEN SATURATION: 96 % | TEMPERATURE: 98 F | SYSTOLIC BLOOD PRESSURE: 126 MMHG | HEIGHT: 66 IN | BODY MASS INDEX: 36.77 KG/M2 | HEART RATE: 80 BPM | WEIGHT: 228.81 LBS | DIASTOLIC BLOOD PRESSURE: 78 MMHG

## 2018-11-12 VITALS
WEIGHT: 227.31 LBS | RESPIRATION RATE: 16 BRPM | DIASTOLIC BLOOD PRESSURE: 71 MMHG | TEMPERATURE: 98 F | HEART RATE: 75 BPM | SYSTOLIC BLOOD PRESSURE: 145 MMHG | OXYGEN SATURATION: 98 % | BODY MASS INDEX: 37.82 KG/M2

## 2018-11-12 DIAGNOSIS — Z17.0 MALIGNANT NEOPLASM OF AXILLARY TAIL OF RIGHT BREAST IN FEMALE, ESTROGEN RECEPTOR POSITIVE: ICD-10-CM

## 2018-11-12 DIAGNOSIS — C50.611 MALIGNANT NEOPLASM OF AXILLARY TAIL OF RIGHT BREAST IN FEMALE, ESTROGEN RECEPTOR POSITIVE: ICD-10-CM

## 2018-11-12 DIAGNOSIS — J32.9 SINUSITIS, UNSPECIFIED CHRONICITY, UNSPECIFIED LOCATION: Primary | ICD-10-CM

## 2018-11-12 DIAGNOSIS — Z79.811 PROPHYLACTIC USE OF LETROZOLE (FEMARA): ICD-10-CM

## 2018-11-12 DIAGNOSIS — Z79.811 PROPHYLACTIC USE OF LETROZOLE (FEMARA): Primary | ICD-10-CM

## 2018-11-12 PROCEDURE — 99999 PR PBB SHADOW E&M-EST. PATIENT-LVL III: CPT | Mod: PBBFAC,,, | Performed by: INTERNAL MEDICINE

## 2018-11-12 PROCEDURE — 99213 OFFICE O/P EST LOW 20 MIN: CPT | Mod: PBBFAC | Performed by: INTERNAL MEDICINE

## 2018-11-12 PROCEDURE — 99214 OFFICE O/P EST MOD 30 MIN: CPT | Mod: PBBFAC,27,PO | Performed by: NURSE PRACTITIONER

## 2018-11-12 PROCEDURE — 99999 PR PBB SHADOW E&M-EST. PATIENT-LVL IV: CPT | Mod: PBBFAC,,, | Performed by: NURSE PRACTITIONER

## 2018-11-12 PROCEDURE — 77065 DX MAMMO INCL CAD UNI: CPT | Mod: TC,PO,LT

## 2018-11-12 PROCEDURE — 99214 OFFICE O/P EST MOD 30 MIN: CPT | Mod: S$PBB,,, | Performed by: INTERNAL MEDICINE

## 2018-11-12 PROCEDURE — 77065 DX MAMMO INCL CAD UNI: CPT | Mod: 26,LT,, | Performed by: RADIOLOGY

## 2018-11-12 PROCEDURE — 99214 OFFICE O/P EST MOD 30 MIN: CPT | Mod: S$PBB,,, | Performed by: NURSE PRACTITIONER

## 2018-11-12 PROCEDURE — 77061 BREAST TOMOSYNTHESIS UNI: CPT | Mod: TC,PO,LT

## 2018-11-12 PROCEDURE — 77061 BREAST TOMOSYNTHESIS UNI: CPT | Mod: 26,LT,, | Performed by: RADIOLOGY

## 2018-11-12 RX ORDER — AMOXICILLIN AND CLAVULANATE POTASSIUM 875; 125 MG/1; MG/1
1 TABLET, FILM COATED ORAL 2 TIMES DAILY
Qty: 20 TABLET | Refills: 0 | Status: SHIPPED | OUTPATIENT
Start: 2018-11-12 | End: 2018-11-22

## 2018-11-12 RX ORDER — RIZATRIPTAN BENZOATE 10 MG/1
TABLET ORAL
COMMUNITY
Start: 2018-11-08 | End: 2019-12-10 | Stop reason: SDUPTHER

## 2018-11-12 RX ORDER — CODEINE PHOSPHATE AND GUAIFENESIN 10; 100 MG/5ML; MG/5ML
5 SOLUTION ORAL EVERY 6 HOURS PRN
Qty: 180 ML | Refills: 0 | Status: SHIPPED | OUTPATIENT
Start: 2018-11-12 | End: 2018-11-22

## 2018-11-12 NOTE — PROGRESS NOTES
"Subjective:      Patient ID: Samuel Kilgore is a 57 y.o. female.    Chief Complaint: Sinusitis    Sinusitis   This is a new problem. The current episode started 1 to 4 weeks ago. The problem has been gradually worsening since onset. There has been no fever. Associated symptoms include congestion, coughing (mild), ear pain, headaches and sinus pressure. Treatments tried: Claritin D. The treatment provided no relief.     Review of Systems   Constitutional: Positive for fatigue. Negative for fever.   HENT: Positive for congestion, ear pain, postnasal drip and sinus pressure.    Respiratory: Positive for cough (mild). Negative for wheezing.    Cardiovascular: Negative.    Gastrointestinal: Negative.    Musculoskeletal: Negative.    Skin: Negative.    Neurological: Positive for headaches.   Hematological: Negative.        Objective:   /78   Pulse 80   Temp 98.4 °F (36.9 °C) (Tympanic)   Resp 17   Ht 5' 6" (1.676 m)   Wt 103.8 kg (228 lb 13.4 oz)   SpO2 96%   BMI 36.94 kg/m²   Physical Exam   Constitutional: She is oriented to person, place, and time. She appears well-developed and well-nourished. She is active and cooperative. No distress.   HENT:   Head: Normocephalic and atraumatic.   Right Ear: Tympanic membrane is erythematous (mild). A middle ear effusion is present.   Left Ear: Tympanic membrane is erythematous (mild). A middle ear effusion is present.   Nose: Mucosal edema and sinus tenderness present.   Mouth/Throat: Uvula is midline and mucous membranes are normal. Posterior oropharyngeal erythema present. No oropharyngeal exudate or posterior oropharyngeal edema.   Eyes: Right eye exhibits no discharge. Left eye exhibits no discharge.   Neck: Normal range of motion. Neck supple.   Cardiovascular: Regular rhythm and normal heart sounds.   Pulmonary/Chest: Effort normal and breath sounds normal. She has no wheezes.   Musculoskeletal: Normal range of motion.   Lymphadenopathy:     She has no cervical " adenopathy.   Neurological: She is alert and oriented to person, place, and time.   Skin: Skin is warm. No rash noted. She is not diaphoretic.   Nursing note and vitals reviewed.    Assessment:      1. Sinusitis, unspecified chronicity, unspecified location       Plan:   Sinusitis, unspecified chronicity, unspecified location  -     amoxicillin-clavulanate 875-125mg (AUGMENTIN) 875-125 mg per tablet; Take 1 tablet by mouth 2 (two) times daily. for 10 days  Dispense: 20 tablet; Refill: 0  -     guaifenesin-codeine 100-10 mg/5 ml (TUSSI-ORGANIDIN NR)  mg/5 mL syrup; Take 5 mLs by mouth every 6 (six) hours as needed for Cough.  Dispense: 180 mL; Refill: 0    Instructions, follow up, and supportive care as per AVS.  Follow up with PCP if not improved or for any new or worsening symptoms.

## 2018-11-12 NOTE — PROGRESS NOTES
Subjective:       Patient ID: Samuel Kilgore is a 57 y.o. female.    Chief Complaint: Use of selective estrogen receptor modulators (SERMs)    HPI   Mrs. Kilgore returns today requesting to be seen.  She states that for the last 6 weeks she has had intermittent sharp, dull, and burining pains in her left reconstructed breast.  She is alarmed enough that she requested a visit here with me.   As of March 2015  she has been on letrozole in the extended adjuvant setting.  Her most recent DXA scan ahd shown normal bone density of the hip and the L spine.    Briefly, she is a 57-year-old female status post bilateral mastectomies, status post adjuvant chemotherapy, currently on adjuvant letrozole for her stage II breast cancer (T1 N1 M0).   Her cancer was ER positive, NV positive, and HER-2 negative. Her chemotherapy consisted of 4 cycles of dose-dense AC followed by four cycle of DD Taxol. She then took tamoxifen x 5 years.  She was started on letrozole in March 2016.    Review of Systems  Overall she feels fair.  She is concerned with the pains.   She is somewhat anxious with the recent developments but she denies any bruising, fevers, chills, night sweats, weight loss, nausea, vomiting, diarrhea, constipation, diplopia, blurred vision, headache, chest pain, right breast pain, or abdominal pain.    Objective:      Physical Exam   GENERAL: She is alert, oriented to time, place, person, pleasant, well nourished, in no acute physical distress.   VITAL SIGNS: Reviewed.   HEENT: Normal. There are no nasal, oral, lip, gingival, auricular, lid, or conjunctival lesions. Mucosae are moist and pink, and there is no thrush. Pupils are equal, reactive to light and accommodation. Extraocular muscle movements are intact.   NECK: Supple without JVD, adenopathy, or thyromegaly.   LUNGS: Clear to auscultation without wheezing, rales, or rhonchi.   CARDIOVASCULAR: Reveals an S1, S2, no murmurs, no rubs, and no gallops.   BREASTS: She is  status post bilateral nipple-sparing mastectomies with no evidence of chest wall recurrence. There are no masses in either breast. There is no induration or tenderness in either breast.   ABDOMEN: Soft, obese, nontender, without organomegaly. Bowel sounds are present.   EXTREMITIES: No cyanosis, clubbing, or edema.   SKIN: Does not have petechiae, rashes, induration, or ecchymoses.   NEUROLOGIC: Motor function is 5/5, DTRs are 0-1+ bilaterally, symmetrical, and cranial nerves within normal.     Assessment:       1. Malignant neoplasm of axillary tail of right breast in female, estrogen receptor positive    2. Adjuvant use of letrozole (Femara)   3.      Pain, left reconstructed breast, of unclear etiology..  Plan:          In regards to her left breast pain, out of abundance of caution we will proceed with a mammogram ASAP.  I suspect it will be negative, nevertheless I advised her to go ahead and have it done ASAP today.  She voiced understanding.  If her mammogram, is negative, I will see her at the time of her next scheduled appointment early next year.    I have asked her to remain on letrozole, which she will take through early March 2021.  .  Her questions were answered to her satisfaction.

## 2018-11-12 NOTE — PATIENT INSTRUCTIONS
· Rest and increase fluids.   · May apply warm compresses as needed.   · Saline nasal spray or saline irrigation (Neti pot) to loosen nasal congestion.  · Flonase or Nasacort to reduce inflammation in the sinus cavities.  · Take antibiotics exactly as prescribed. Make sure to complete the entire course of antibiotics even if you start feeling better. This will prevent recurrence of your infection and bacterial resistance.   · Do not drive, drink alcohol, or take any other sedating medications or substances while taking cough syrup.   · Follow up with your primary care provider or with ENT if not improved within a few days or sooner for any new or worsening symptoms.   · Go to the ER for any fever that does not improve with Tylenol/Ibuprofen, neck stiffness, rash, severe headache, vision changes, shortness of breath, chest pain, severe facial pain or swelling, or for any other new and concerning symptoms.     Sinusitis (Antibiotic Treatment)    The sinuses are air-filled spaces within the bones of the face. They connect to the inside of the nose. Sinusitis is an inflammation of the tissue lining the sinus cavity. Sinus inflammation can occur during a cold. It can also be due to allergies to pollens and other particles in the air. Sinusitis can cause symptoms of sinus congestion and fullness. A sinus infection causes fever, headache and facial pain. There is often green or yellow drainage from the nose or into the back of the throat (post-nasal drip). You have been given antibiotics to treat this condition.  Home care:  · Take the full course of antibiotics as instructed. Do not stop taking them, even if you feel better.  · Drink plenty of water, hot tea, and other liquids. This may help thin mucus. It also may promote sinus drainage.  · Heat may help soothe painful areas of the face. Use a towel soaked in hot water. Or,  the shower and direct the hot spray onto your face. Using a vaporizer along with a  menthol rub at night may also help.   · An expectorant containing guaifenesin may help thin the mucus and promote drainage from the sinuses.  · Over-the-counter decongestants may be used unless a similar medicine was prescribed. Nasal sprays work the fastest. Use one that contains phenylephrine or oxymetazoline. First blow the nose gently. Then use the spray. Do not use these medicines more often than directed on the label or symptoms may get worse. You may also use tablets containing pseudoephedrine. Avoid products that combine ingredients, because side effects may be increased. Read labels. You can also ask the pharmacist for help. (NOTE: Persons with high blood pressure should not use decongestants. They can raise blood pressure.)  · Over-the-counter antihistamines may help if allergies contributed to your sinusitis.    · Do not use nasal rinses or irrigation during an acute sinus infection, unless told to by your health care provider. Rinsing may spread the infection to other sinuses.  · Use acetaminophen or ibuprofen to control pain, unless another pain medicine was prescribed. (If you have chronic liver or kidney disease or ever had a stomach ulcer, talk with your doctor before using these medicines. Aspirin should never be used in anyone under 18 years of age who is ill with a fever. It may cause severe liver damage.)  · Don't smoke. This can worsen symptoms.  Follow-up care  Follow up with your healthcare provider or our staff if you are not improving within the next week.  When to seek medical advice  Call your healthcare provider if any of these occur:  · Facial pain or headache becoming more severe  · Stiff neck  · Unusual drowsiness or confusion  · Swelling of the forehead or eyelids  · Vision problems, including blurred or double vision  · Fever of 100.4ºF (38ºC) or higher, or as directed by your healthcare provider  · Seizure  · Breathing problems  · Symptoms not resolving within 10 days  Date Last  Reviewed: 4/13/2015  © 0384-3002 [employer]. [address] [place] [address], [place] [address]. All rights reserved. This information is not intended as a substitute for professional medical care. Always follow your healthcare professional's instructions.

## 2019-01-22 NOTE — PROGRESS NOTES
"Subjective:      Patient ID: Samuel Kilgore is a 58 y.o. female.    Chief Complaint: Establish Care      HPI  Patient is here to establish care, and for preventive exam.  Has been seeing Neuro for migraines, doesn't tolerated any prophylaxic rx.  Maxalt relieves.  Seeing Derm Dr. Ramirez in [place] for hair loss x 6mo, was told has lupus.  No rash or significant joint pains.  No f/c/sw/cough.  Recent URI for ear infx/sinus.  Took steroid and augmentin course.  Poor energy in general.  No regular exercise.  On CPAP, doesn't sleep very well.  No cp/sob/palp.  BMs sometimes loose, no black or blood.  Urine normal.  Taking vit D but not regularly.      HM:  1/18 [id number], 3/16 [id number], 4/16 BMD, 2/19 today TDaP, 11/18 MMG/ follows with [facility] clinic, 9/12 Cscope rep 10y.        Review of Systems   Constitutional: Negative for appetite change, chills, diaphoresis and fever.   HENT: Negative for congestion, ear pain, rhinorrhea, sinus pressure and sore throat.    Respiratory: Negative for cough, chest tightness and shortness of breath.    Cardiovascular: Negative for chest pain and palpitations.   Gastrointestinal: Negative for blood in stool, constipation, diarrhea, nausea and vomiting.   Genitourinary: Negative for dysuria, frequency, hematuria, menstrual problem, urgency and vaginal discharge.   Musculoskeletal: Negative for arthralgias.   Skin: Negative for rash.   Neurological: Negative for dizziness and headaches.   Psychiatric/Behavioral: Negative for sleep disturbance. The patient is not nervous/anxious.          Objective:   /86 (BP Location: Left arm, Patient Position: Sitting)   Pulse 96   Temp 97.5 °F (36.4 °C) (Tympanic)   Ht 5' 6" (1.676 m)   Wt 106.3 kg (234 lb 5.6 oz)   SpO2 96%   BMI 37.82 kg/m²     Physical Exam   Constitutional: She is oriented to person, place, and time. She appears well-developed and well-nourished.   HENT:   Right Ear: External ear normal. Tympanic membrane is not " injected.   Left Ear: External ear normal. Tympanic membrane is not injected.   Mouth/Throat: Oropharynx is clear and moist.   Eyes: Conjunctivae are normal.   Neck: Normal range of motion. Neck supple. No thyromegaly present.   Cardiovascular: Normal rate, regular rhythm and intact distal pulses. Exam reveals no gallop and no friction rub.   No murmur heard.  Pulmonary/Chest: Effort normal and breath sounds normal. She has no wheezes. She has no rales.   Abdominal: Soft. Bowel sounds are normal. She exhibits no mass. There is no tenderness.   Musculoskeletal: She exhibits no edema.   Lymphadenopathy:     She has no cervical adenopathy.   Neurological: She is alert and oriented to person, place, and time.   Skin: Skin is warm. No rash noted.   Psychiatric: She has a normal mood and affect.           Assessment:       1. Encounter for preventive health examination    2. Vitamin B deficiency    3. Mild vitamin D deficiency    4. Obstructive sleep apnea syndrome    5. Migraine without aura and without status migrainosus, not intractable    6. Malignant neoplasm of nipple of right breast in female, unspecified estrogen receptor status    7. Anxiety    8. Malaise and fatigue          Plan:     Encounter for preventive health examination-TDaP.  Lab now with ND.  RTC 6mo.  -     Tdap Vaccine  -     CBC auto differential; Future; Expected date: 02/05/2019  -     Comprehensive metabolic panel; Future; Expected date: 02/05/2019  -     Lipid panel; Future; Expected date: 02/05/2019  -     TSH; Future; Expected date: 02/05/2019  -     Vitamin D; Future  -     Hemoglobin A1c; Future; Expected date: 02/05/2019  -     C-reactive protein; Future; Expected date: 02/05/2019  -     Sedimentation rate; Future; Expected date: 02/05/2019    Vitamin B deficiency- ?    Mild vitamin D deficiency- check lab.  -     Vitamin D; Future    Obstructive sleep apnea syndrome    Migraine without aura and without status migrainosus, not intractable-  cotn prn maxalt.    Malignant neoplasm of nipple of right breast in female, unspecified estrogen receptor status- per [facility].    Anxiety- on no rx now.    Malaise and fatigue- check labs.

## 2019-01-31 ENCOUNTER — OFFICE VISIT (OUTPATIENT)
Dept: URGENT CARE | Facility: CLINIC | Age: 59
End: 2019-01-31
Payer: MEDICAID

## 2019-01-31 VITALS
RESPIRATION RATE: 18 BRPM | SYSTOLIC BLOOD PRESSURE: 138 MMHG | BODY MASS INDEX: 37.45 KG/M2 | DIASTOLIC BLOOD PRESSURE: 80 MMHG | WEIGHT: 233 LBS | TEMPERATURE: 98 F | HEART RATE: 92 BPM | HEIGHT: 66 IN | OXYGEN SATURATION: 99 %

## 2019-01-31 DIAGNOSIS — R05.9 COUGH: ICD-10-CM

## 2019-01-31 DIAGNOSIS — J06.9 BACTERIAL URI: Primary | ICD-10-CM

## 2019-01-31 DIAGNOSIS — B96.89 BACTERIAL URI: Primary | ICD-10-CM

## 2019-01-31 PROCEDURE — 99213 OFFICE O/P EST LOW 20 MIN: CPT | Mod: PBBFAC,PO | Performed by: NURSE PRACTITIONER

## 2019-01-31 PROCEDURE — 99999 PR PBB SHADOW E&M-EST. PATIENT-LVL III: ICD-10-PCS | Mod: PBBFAC,,, | Performed by: NURSE PRACTITIONER

## 2019-01-31 PROCEDURE — 99999 PR PBB SHADOW E&M-EST. PATIENT-LVL III: CPT | Mod: PBBFAC,,, | Performed by: NURSE PRACTITIONER

## 2019-01-31 PROCEDURE — 99214 OFFICE O/P EST MOD 30 MIN: CPT | Mod: S$PBB,,, | Performed by: NURSE PRACTITIONER

## 2019-01-31 PROCEDURE — 99214 PR OFFICE/OUTPT VISIT, EST, LEVL IV, 30-39 MIN: ICD-10-PCS | Mod: S$PBB,,, | Performed by: NURSE PRACTITIONER

## 2019-01-31 RX ORDER — METHYLPREDNISOLONE 4 MG/1
TABLET ORAL
Qty: 21 TABLET | Refills: 0 | Status: SHIPPED | OUTPATIENT
Start: 2019-01-31 | End: 2019-02-21

## 2019-01-31 RX ORDER — AMOXICILLIN AND CLAVULANATE POTASSIUM 875; 125 MG/1; MG/1
1 TABLET, FILM COATED ORAL EVERY 12 HOURS
Qty: 20 TABLET | Refills: 0 | Status: SHIPPED | OUTPATIENT
Start: 2019-01-31 | End: 2019-02-10

## 2019-01-31 RX ORDER — GUAIFENESIN 600 MG/1
1200 TABLET, EXTENDED RELEASE ORAL 2 TIMES DAILY PRN
Qty: 40 TABLET | Refills: 0 | Status: SHIPPED | OUTPATIENT
Start: 2019-01-31 | End: 2019-02-07

## 2019-01-31 RX ORDER — PROMETHAZINE HYDROCHLORIDE AND DEXTROMETHORPHAN HYDROBROMIDE 6.25; 15 MG/5ML; MG/5ML
5 SYRUP ORAL NIGHTLY PRN
Qty: 118 ML | Refills: 0 | Status: SHIPPED | OUTPATIENT
Start: 2019-01-31 | End: 2019-02-10

## 2019-02-01 NOTE — PROGRESS NOTES
Subjective:       Patient ID: Samuel Kilgore is a 58 y.o. female.    Chief Complaint: Nasal Congestion (sinus pressure, coughing, headches)    Sinus Problem   This is a new problem. The current episode started in the past 7 days. The problem is unchanged. There has been no fever. Associated symptoms include congestion, coughing, ear pain, headaches, sinus pressure and a sore throat. Pertinent negatives include no shortness of breath or swollen glands. Past treatments include oral decongestants. The treatment provided no relief.     Review of Systems   Constitutional: Negative for fatigue and fever.   HENT: Positive for congestion, ear pain, postnasal drip, rhinorrhea, sinus pressure, sinus pain and sore throat.    Respiratory: Positive for cough. Negative for shortness of breath, wheezing and stridor.    Skin: Negative for color change.   Allergic/Immunologic: Negative for environmental allergies.   Neurological: Positive for headaches. Negative for dizziness.   Psychiatric/Behavioral: Negative for agitation.       Objective:      Physical Exam   Constitutional: She is oriented to person, place, and time. She appears well-developed and well-nourished.   HENT:   Head: Normocephalic.   Nose: Mucosal edema, rhinorrhea and sinus tenderness present. Right sinus exhibits maxillary sinus tenderness. Right sinus exhibits no frontal sinus tenderness. Left sinus exhibits maxillary sinus tenderness. Left sinus exhibits no frontal sinus tenderness.   Mouth/Throat: Uvula is midline and mucous membranes are normal. Posterior oropharyngeal erythema present. No tonsillar exudate.   Eyes: Pupils are equal, round, and reactive to light.   Cardiovascular: Normal rate and regular rhythm.   Pulmonary/Chest: Effort normal and breath sounds normal.   Abdominal: Soft. Bowel sounds are normal.   Musculoskeletal: Normal range of motion.   Neurological: She is alert and oriented to person, place, and time.   Skin: Skin is warm and dry.    Psychiatric: She has a normal mood and affect.   Nursing note and vitals reviewed.      Assessment:       1. Bacterial URI    2. Cough        Plan:         Samuel was seen today for nasal congestion.    Diagnoses and all orders for this visit:    Bacterial URI  -     amoxicillin-clavulanate 875-125mg (AUGMENTIN) 875-125 mg per tablet; Take 1 tablet by mouth every 12 (twelve) hours. for 10 days  -     methylPREDNISolone (MEDROL DOSEPACK) 4 mg tablet; use as directed    Cough  -     promethazine-dextromethorphan (PROMETHAZINE-DM) 6.25-15 mg/5 mL Syrp; Take 5 mLs by mouth nightly as needed.  -     guaiFENesin (MUCINEX) 600 mg 12 hr tablet; Take 2 tablets (1,200 mg total) by mouth 2 (two) times daily as needed for Congestion.    Follow prescribed treatment plan as directed.  Stay hydrated and rest.  Report to ER if symptoms worsen.  Follow up with PCP in 2-3 days or sooner if symptoms do not improve.

## 2019-02-04 DIAGNOSIS — Z85.3 HX: BREAST CANCER: Chronic | ICD-10-CM

## 2019-02-04 RX ORDER — LETROZOLE 2.5 MG/1
2.5 TABLET, FILM COATED ORAL DAILY
Qty: 90 TABLET | Refills: 1 | Status: SHIPPED | OUTPATIENT
Start: 2019-02-04 | End: 2019-08-24 | Stop reason: SDUPTHER

## 2019-02-04 RX ORDER — LETROZOLE 2.5 MG/1
2.5 TABLET, FILM COATED ORAL DAILY
Qty: 90 TABLET | Refills: 1 | OUTPATIENT
Start: 2019-02-04 | End: 2020-02-04

## 2019-02-05 ENCOUNTER — LAB VISIT (OUTPATIENT)
Dept: LAB | Facility: HOSPITAL | Age: 59
End: 2019-02-05
Attending: INTERNAL MEDICINE
Payer: MEDICAID

## 2019-02-05 ENCOUNTER — OFFICE VISIT (OUTPATIENT)
Dept: INTERNAL MEDICINE | Facility: CLINIC | Age: 59
End: 2019-02-05
Payer: MEDICAID

## 2019-02-05 VITALS
BODY MASS INDEX: 37.67 KG/M2 | HEART RATE: 96 BPM | OXYGEN SATURATION: 96 % | HEIGHT: 66 IN | TEMPERATURE: 98 F | SYSTOLIC BLOOD PRESSURE: 134 MMHG | WEIGHT: 234.38 LBS | DIASTOLIC BLOOD PRESSURE: 86 MMHG

## 2019-02-05 DIAGNOSIS — E55.9 MILD VITAMIN D DEFICIENCY: Chronic | ICD-10-CM

## 2019-02-05 DIAGNOSIS — R53.81 MALAISE AND FATIGUE: ICD-10-CM

## 2019-02-05 DIAGNOSIS — Z00.00 ENCOUNTER FOR PREVENTIVE HEALTH EXAMINATION: Primary | ICD-10-CM

## 2019-02-05 DIAGNOSIS — E53.9 VITAMIN B DEFICIENCY: ICD-10-CM

## 2019-02-05 DIAGNOSIS — R53.83 MALAISE AND FATIGUE: ICD-10-CM

## 2019-02-05 DIAGNOSIS — C50.011 MALIGNANT NEOPLASM OF NIPPLE OF RIGHT BREAST IN FEMALE, UNSPECIFIED ESTROGEN RECEPTOR STATUS: ICD-10-CM

## 2019-02-05 DIAGNOSIS — G43.009 MIGRAINE WITHOUT AURA AND WITHOUT STATUS MIGRAINOSUS, NOT INTRACTABLE: Chronic | ICD-10-CM

## 2019-02-05 DIAGNOSIS — Z00.00 ENCOUNTER FOR PREVENTIVE HEALTH EXAMINATION: ICD-10-CM

## 2019-02-05 DIAGNOSIS — G47.33 OBSTRUCTIVE SLEEP APNEA SYNDROME: ICD-10-CM

## 2019-02-05 DIAGNOSIS — F41.9 ANXIETY: Chronic | ICD-10-CM

## 2019-02-05 LAB
25(OH)D3+25(OH)D2 SERPL-MCNC: 35 NG/ML
ALBUMIN SERPL BCP-MCNC: 4.2 G/DL
ALP SERPL-CCNC: 88 U/L
ALT SERPL W/O P-5'-P-CCNC: 52 U/L
ANION GAP SERPL CALC-SCNC: 7 MMOL/L
AST SERPL-CCNC: 25 U/L
BASOPHILS # BLD AUTO: 0.05 K/UL
BASOPHILS NFR BLD: 0.5 %
BILIRUB SERPL-MCNC: 0.7 MG/DL
BUN SERPL-MCNC: 18 MG/DL
CALCIUM SERPL-MCNC: 10.5 MG/DL
CHLORIDE SERPL-SCNC: 102 MMOL/L
CHOLEST SERPL-MCNC: 233 MG/DL
CHOLEST/HDLC SERPL: 3.6 {RATIO}
CO2 SERPL-SCNC: 33 MMOL/L
CREAT SERPL-MCNC: 0.9 MG/DL
CRP SERPL-MCNC: 3.6 MG/L
DIFFERENTIAL METHOD: ABNORMAL
EOSINOPHIL # BLD AUTO: 0 K/UL
EOSINOPHIL NFR BLD: 0.4 %
ERYTHROCYTE [DISTWIDTH] IN BLOOD BY AUTOMATED COUNT: 12.6 %
ERYTHROCYTE [SEDIMENTATION RATE] IN BLOOD BY WESTERGREN METHOD: 5 MM/HR
EST. GFR  (AFRICAN AMERICAN): >60 ML/MIN/1.73 M^2
EST. GFR  (NON AFRICAN AMERICAN): >60 ML/MIN/1.73 M^2
ESTIMATED AVG GLUCOSE: 111 MG/DL
GLUCOSE SERPL-MCNC: 97 MG/DL
HBA1C MFR BLD HPLC: 5.5 %
HCT VFR BLD AUTO: 42.9 %
HDLC SERPL-MCNC: 65 MG/DL
HDLC SERPL: 27.9 %
HGB BLD-MCNC: 14.3 G/DL
IMM GRANULOCYTES # BLD AUTO: 0.06 K/UL
IMM GRANULOCYTES NFR BLD AUTO: 0.6 %
LDLC SERPL CALC-MCNC: 121.8 MG/DL
LYMPHOCYTES # BLD AUTO: 2.9 K/UL
LYMPHOCYTES NFR BLD: 29.1 %
MCH RBC QN AUTO: 30.7 PG
MCHC RBC AUTO-ENTMCNC: 33.3 G/DL
MCV RBC AUTO: 92 FL
MONOCYTES # BLD AUTO: 0.7 K/UL
MONOCYTES NFR BLD: 7.2 %
NEUTROPHILS # BLD AUTO: 6.1 K/UL
NEUTROPHILS NFR BLD: 62.2 %
NONHDLC SERPL-MCNC: 168 MG/DL
NRBC BLD-RTO: 0 /100 WBC
PLATELET # BLD AUTO: 316 K/UL
PMV BLD AUTO: 9.9 FL
POTASSIUM SERPL-SCNC: 4 MMOL/L
PROT SERPL-MCNC: 7.9 G/DL
RBC # BLD AUTO: 4.66 M/UL
SODIUM SERPL-SCNC: 142 MMOL/L
TRIGL SERPL-MCNC: 231 MG/DL
TSH SERPL DL<=0.005 MIU/L-ACNC: 1.11 UIU/ML
WBC # BLD AUTO: 9.82 K/UL

## 2019-02-05 PROCEDURE — 90471 IMMUNIZATION ADMIN: CPT | Mod: PBBFAC,PN

## 2019-02-05 PROCEDURE — 85025 COMPLETE CBC W/AUTO DIFF WBC: CPT

## 2019-02-05 PROCEDURE — 99999 PR PBB SHADOW E&M-EST. PATIENT-LVL IV: CPT | Mod: PBBFAC,,, | Performed by: INTERNAL MEDICINE

## 2019-02-05 PROCEDURE — 84443 ASSAY THYROID STIM HORMONE: CPT

## 2019-02-05 PROCEDURE — 80053 COMPREHEN METABOLIC PANEL: CPT

## 2019-02-05 PROCEDURE — 83036 HEMOGLOBIN GLYCOSYLATED A1C: CPT

## 2019-02-05 PROCEDURE — 86140 C-REACTIVE PROTEIN: CPT

## 2019-02-05 PROCEDURE — 99396 PREV VISIT EST AGE 40-64: CPT | Mod: 25,S$PBB,, | Performed by: INTERNAL MEDICINE

## 2019-02-05 PROCEDURE — 99396 PR PREVENTIVE VISIT,EST,40-64: ICD-10-PCS | Mod: 25,S$PBB,, | Performed by: INTERNAL MEDICINE

## 2019-02-05 PROCEDURE — 99999 PR PBB SHADOW E&M-EST. PATIENT-LVL IV: ICD-10-PCS | Mod: PBBFAC,,, | Performed by: INTERNAL MEDICINE

## 2019-02-05 PROCEDURE — 99214 OFFICE O/P EST MOD 30 MIN: CPT | Mod: PBBFAC,PN,25 | Performed by: INTERNAL MEDICINE

## 2019-02-05 PROCEDURE — 82306 VITAMIN D 25 HYDROXY: CPT

## 2019-02-05 PROCEDURE — 80061 LIPID PANEL: CPT

## 2019-02-05 PROCEDURE — 36415 COLL VENOUS BLD VENIPUNCTURE: CPT

## 2019-02-05 PROCEDURE — 85652 RBC SED RATE AUTOMATED: CPT

## 2019-02-05 RX ORDER — PROMETHAZINE HYDROCHLORIDE 25 MG/1
25 TABLET ORAL EVERY 6 HOURS PRN
Qty: 20 TABLET | Refills: 1 | Status: SHIPPED | OUTPATIENT
Start: 2019-02-05 | End: 2022-04-21

## 2019-02-06 ENCOUNTER — PATIENT MESSAGE (OUTPATIENT)
Dept: INTERNAL MEDICINE | Facility: CLINIC | Age: 59
End: 2019-02-06

## 2019-02-06 DIAGNOSIS — R74.8 ELEVATED LIVER ENZYMES: Primary | ICD-10-CM

## 2019-04-23 ENCOUNTER — PATIENT MESSAGE (OUTPATIENT)
Dept: INTERNAL MEDICINE | Facility: CLINIC | Age: 59
End: 2019-04-23

## 2019-04-23 RX ORDER — HYDROXYZINE HYDROCHLORIDE 10 MG/1
10 TABLET, FILM COATED ORAL 3 TIMES DAILY PRN
Qty: 60 TABLET | Refills: 3 | Status: SHIPPED | OUTPATIENT
Start: 2019-04-23 | End: 2021-10-19

## 2019-07-18 ENCOUNTER — TELEPHONE (OUTPATIENT)
Dept: HEMATOLOGY/ONCOLOGY | Facility: CLINIC | Age: 59
End: 2019-07-18

## 2019-07-18 NOTE — TELEPHONE ENCOUNTER
Returned call rescheduled appt that was canceled in March. Scheduled with MOHAMUD Friday 7/19.        ----- Message from Brianne Arce sent at 7/18/2019 12:49 PM CDT -----  Contact: pt   Pt called to schedule appt   Callback#[phone]  Thank You  NAVEED Arce

## 2019-07-19 ENCOUNTER — TELEPHONE (OUTPATIENT)
Dept: HEMATOLOGY/ONCOLOGY | Facility: CLINIC | Age: 59
End: 2019-07-19

## 2019-07-19 ENCOUNTER — OFFICE VISIT (OUTPATIENT)
Dept: HEMATOLOGY/ONCOLOGY | Facility: CLINIC | Age: 59
End: 2019-07-19
Payer: MEDICAID

## 2019-07-19 VITALS
OXYGEN SATURATION: 97 % | WEIGHT: 218.06 LBS | DIASTOLIC BLOOD PRESSURE: 78 MMHG | RESPIRATION RATE: 20 BRPM | BODY MASS INDEX: 35.19 KG/M2 | SYSTOLIC BLOOD PRESSURE: 143 MMHG | TEMPERATURE: 98 F | HEART RATE: 65 BPM

## 2019-07-19 DIAGNOSIS — Z17.0 MALIGNANT NEOPLASM OF RIGHT BREAST IN FEMALE, ESTROGEN RECEPTOR POSITIVE, UNSPECIFIED SITE OF BREAST: Primary | ICD-10-CM

## 2019-07-19 DIAGNOSIS — Z79.811 PROPHYLACTIC USE OF LETROZOLE (FEMARA): ICD-10-CM

## 2019-07-19 DIAGNOSIS — C50.911 MALIGNANT NEOPLASM OF RIGHT BREAST IN FEMALE, ESTROGEN RECEPTOR POSITIVE, UNSPECIFIED SITE OF BREAST: Primary | ICD-10-CM

## 2019-07-19 PROCEDURE — 99213 OFFICE O/P EST LOW 20 MIN: CPT | Mod: S$PBB,,, | Performed by: NURSE PRACTITIONER

## 2019-07-19 PROCEDURE — 99213 PR OFFICE/OUTPT VISIT, EST, LEVL III, 20-29 MIN: ICD-10-PCS | Mod: S$PBB,,, | Performed by: NURSE PRACTITIONER

## 2019-07-19 PROCEDURE — 99213 OFFICE O/P EST LOW 20 MIN: CPT | Mod: PBBFAC | Performed by: NURSE PRACTITIONER

## 2019-07-19 PROCEDURE — 99999 PR PBB SHADOW E&M-EST. PATIENT-LVL III: ICD-10-PCS | Mod: PBBFAC,,, | Performed by: NURSE PRACTITIONER

## 2019-07-19 PROCEDURE — 99999 PR PBB SHADOW E&M-EST. PATIENT-LVL III: CPT | Mod: PBBFAC,,, | Performed by: NURSE PRACTITIONER

## 2019-07-19 NOTE — PROGRESS NOTES
Subjective:       Patient ID: Samuel Kilgore is a 58 y.o. female.    Chief Complaint: Malignant neoplasm of upper-inner quadrant of right female breast    HPI   Mrs. Kilgore returns today for followup for her adjuvant treatment of breast cancer. As of March 2015  she has been on letrozole in the extended adjuvant setting.  Her most recent DXA scan shown normal bone density of the hip and the L spine.  Her follow up has been delayed due to multiple stressors- death in family, etc.  Seeing rheumatology for new diagnosis of Lupus. She tells me that she is borderline fibromyalgia.   On Optavia - lost 20 lbs so far.   Using essential oils.    Briefly, she is a 58-year-old female status post bilateral mastectomies, status post adjuvant chemotherapy, currently on adjuvant tamoxifen for her stage II breast cancer (T1 N1 M0).   Her cancer was ER positive, KY positive, and HER-2 negative. Her chemotherapy consisted of 4 cycles of dose-dense AC followed by four cycle of DD Taxol. She then took tamoxifen x 5 years.  She was started on letrozole in March 2016.    Review of Systems  Overall she feels good.  No new complaints. Fatigue and hair loss continues but overall better. Intentional weight loss.    She has tolerated the letrozole well so far; she does report occasional hot flashes but overall they have decreased in severity over the years.  She denies any anxiety, easy bruising, fevers, chills, night sweats, weight loss, nausea, vomiting, diarrhea, constipation, diplopia, blurred vision, headache, chest pain, or abdominal pain.      Objective:      Physical Exam   GENERAL: She is alert, oriented to time, place, person, pleasant, well nourished, in no acute physical distress. No anxiety noted. Presents alone.  VITAL SIGNS: Reviewed.   HEENT: Normal. There are no nasal, oral, lip, gingival, auricular, lid, or conjunctival lesions. Mucosae are moist and pink, and there is no thrush. Pupils are equal, reactive to light and  accommodation. Extraocular muscle movements are intact.   NECK: Supple without JVD, adenopathy, or thyromegaly.   LUNGS: Clear to auscultation without wheezing, rales, or rhonchi.   CARDIOVASCULAR: Reveals an S1, S2, no murmurs, no rubs, and no gallops.   BREASTS: She is status post bilateral nipple-sparing mastectomies with no evidence of chest wall recurrence. There are no masses in either breast. There is no induration or tenderness in either breast.   ABDOMEN: Soft, obese, nontender, without organomegaly. Bowel sounds are present.   EXTREMITIES: No cyanosis, clubbing, or edema.   SKIN: Does not have petechiae, rashes, induration, or ecchymoses.   NEUROLOGIC: Motor function is 5/5, DTRs are 0-1+ bilaterally, symmetrical, and cranial nerves within normal.     Assessment:       1. Malignant neoplasm of right breast in female, estrogen receptor positive, unspecified site of breast    2. Prophylactic use of letrozole (Femara)        Plan:           Doing well, HANK clinically.   Due for BMD  Vit D adequately replaced.   Continue letrozole, which she will take through early March 2021.    RTC in 6 months to see Dr. Downey.    Continue to follow up with PCP for other health maintenance. Reminded that she needs Vit D yearly.     Patient is in agreement with the proposed treatment plan. All questions were answered to the patient's satisfaction. Pt knows to call clinic for any new or worsening symptoms and if anything is needed before the next clinic visit.      Hipolito Syed, NILESP-C  Hematology & Oncology  [facility] [place] [address]  ph. [phone]  Fax. [phone]     I spent 30 minutes (face to face) with the patient, more than 50% was in counseling and coordination of care as detailed above.

## 2019-07-19 NOTE — TELEPHONE ENCOUNTER
----- Message from Hipolito Syed NP sent at 7/19/2019  1:58 PM CDT -----  BMD due now   RTC in 6 months to see Dr. Downey.

## 2019-07-23 ENCOUNTER — HOSPITAL ENCOUNTER (OUTPATIENT)
Dept: RADIOLOGY | Facility: CLINIC | Age: 59
Discharge: HOME | End: 2019-07-23
Attending: NURSE PRACTITIONER
Payer: MEDICAID

## 2019-07-23 DIAGNOSIS — Z17.0 MALIGNANT NEOPLASM OF RIGHT BREAST IN FEMALE, ESTROGEN RECEPTOR POSITIVE, UNSPECIFIED SITE OF BREAST: ICD-10-CM

## 2019-07-23 DIAGNOSIS — Z79.811 PROPHYLACTIC USE OF LETROZOLE (FEMARA): ICD-10-CM

## 2019-07-23 DIAGNOSIS — C50.911 MALIGNANT NEOPLASM OF RIGHT BREAST IN FEMALE, ESTROGEN RECEPTOR POSITIVE, UNSPECIFIED SITE OF BREAST: ICD-10-CM

## 2019-07-23 PROCEDURE — 77080 DXA BONE DENSITY AXIAL: CPT | Mod: TC

## 2019-07-23 PROCEDURE — 77080 DEXA BONE DENSITY SPINE HIP: ICD-10-PCS | Mod: 26,,, | Performed by: INTERNAL MEDICINE

## 2019-07-23 PROCEDURE — 77080 DXA BONE DENSITY AXIAL: CPT | Mod: 26,,, | Performed by: INTERNAL MEDICINE

## 2019-07-30 ENCOUNTER — LAB VISIT (OUTPATIENT)
Dept: LAB | Facility: HOSPITAL | Age: 59
End: 2019-07-30
Attending: INTERNAL MEDICINE
Payer: MEDICAID

## 2019-07-30 DIAGNOSIS — R74.8 ELEVATED LIVER ENZYMES: ICD-10-CM

## 2019-07-30 LAB
ALBUMIN SERPL BCP-MCNC: 4.5 G/DL (ref 3.5–5.2)
ALP SERPL-CCNC: 86 U/L (ref 55–135)
ALT SERPL W/O P-5'-P-CCNC: 75 U/L (ref 10–44)
AST SERPL-CCNC: 35 U/L (ref 10–40)
BILIRUB DIRECT SERPL-MCNC: 0.4 MG/DL (ref 0.1–0.3)
BILIRUB SERPL-MCNC: 0.9 MG/DL (ref 0.1–1)
PROT SERPL-MCNC: 7.4 G/DL (ref 6–8.4)

## 2019-07-30 PROCEDURE — 36415 COLL VENOUS BLD VENIPUNCTURE: CPT

## 2019-07-30 PROCEDURE — 80076 HEPATIC FUNCTION PANEL: CPT

## 2019-08-06 RX ORDER — FLUTICASONE PROPIONATE 50 MCG
SPRAY, SUSPENSION (ML) NASAL
COMMUNITY
End: 2021-10-19 | Stop reason: ALTCHOICE

## 2019-08-07 ENCOUNTER — TELEPHONE (OUTPATIENT)
Dept: FAMILY MEDICINE | Facility: CLINIC | Age: 59
End: 2019-08-07

## 2019-08-07 NOTE — TELEPHONE ENCOUNTER
----- Message from Tran Lora sent at 8/7/2019  2:09 PM CDT -----  Contact: RAFAEL JIANG [[id number]]  Name of Who is Calling:RAFAEL JIANG [[id number]]       What is the request in detail: Pt is requesting a call back from clinical team in regard to getting reschedule for her appt. Please contact to further discuss and advise.          Can the clinic reply by [facility]:       What Number to [facility] Back [facility] not [facility]: [phone]

## 2019-08-14 ENCOUNTER — OFFICE VISIT (OUTPATIENT)
Dept: FAMILY MEDICINE | Facility: CLINIC | Age: 59
End: 2019-08-14
Payer: MEDICAID

## 2019-08-14 VITALS
DIASTOLIC BLOOD PRESSURE: 72 MMHG | SYSTOLIC BLOOD PRESSURE: 146 MMHG | BODY MASS INDEX: 33.8 KG/M2 | HEART RATE: 83 BPM | TEMPERATURE: 97 F | WEIGHT: 210.31 LBS | OXYGEN SATURATION: 92 % | HEIGHT: 66 IN

## 2019-08-14 DIAGNOSIS — R74.8 ELEVATED LIVER ENZYMES: Primary | ICD-10-CM

## 2019-08-14 DIAGNOSIS — G47.33 OBSTRUCTIVE SLEEP APNEA SYNDROME: ICD-10-CM

## 2019-08-14 DIAGNOSIS — C50.011 MALIGNANT NEOPLASM OF NIPPLE OF RIGHT BREAST IN FEMALE, UNSPECIFIED ESTROGEN RECEPTOR STATUS: ICD-10-CM

## 2019-08-14 DIAGNOSIS — F41.9 ANXIETY: Chronic | ICD-10-CM

## 2019-08-14 DIAGNOSIS — G43.009 MIGRAINE WITHOUT AURA AND WITHOUT STATUS MIGRAINOSUS, NOT INTRACTABLE: Chronic | ICD-10-CM

## 2019-08-14 PROCEDURE — 99999 PR PBB SHADOW E&M-EST. PATIENT-LVL III: CPT | Mod: PBBFAC,,, | Performed by: INTERNAL MEDICINE

## 2019-08-14 PROCEDURE — 99999 PR PBB SHADOW E&M-EST. PATIENT-LVL III: ICD-10-PCS | Mod: PBBFAC,,, | Performed by: INTERNAL MEDICINE

## 2019-08-14 PROCEDURE — 99214 OFFICE O/P EST MOD 30 MIN: CPT | Mod: S$PBB,,, | Performed by: INTERNAL MEDICINE

## 2019-08-14 PROCEDURE — 99213 OFFICE O/P EST LOW 20 MIN: CPT | Mod: PBBFAC,PO | Performed by: INTERNAL MEDICINE

## 2019-08-14 PROCEDURE — 99214 PR OFFICE/OUTPT VISIT, EST, LEVL IV, 30-39 MIN: ICD-10-PCS | Mod: S$PBB,,, | Performed by: INTERNAL MEDICINE

## 2019-08-14 NOTE — PROGRESS NOTES
"Subjective:      Patient ID: Samuel Kilgore is a 58 y.o. female.    Chief Complaint: Follow-up (6 months )      HPI  Here for follow up of medical problems.  Dx with FM and lupus.  Stress is much better.  Migraines doing better lately, about 1-2x per month.  Set off by chemical smells.  Has lost 24# on her own, mostly diet with Optivia.  Light walking for exercise.    Updated/ annual due 2/20:  HM:  1/18 [id number], 3/16 [id number], 4/16 BMD, 2/19 TDaP, 11/18 MMG/ follows with [facility] clinic, 9/12 Cscope rep 10y, Rheum Dr. Harris.        Review of Systems   Constitutional: Negative for chills, diaphoresis and fever.   Respiratory: Negative for cough and shortness of breath.    Cardiovascular: Negative for chest pain, palpitations and leg swelling.   Gastrointestinal: Negative for blood in stool, constipation, diarrhea, nausea and vomiting.   Genitourinary: Negative for dysuria, frequency and hematuria.   Psychiatric/Behavioral: The patient is not nervous/anxious.          Objective:   BP (!) 146/72 (BP Location: Right arm, Patient Position: Sitting, BP Method: Large (Manual))   Pulse 83   Temp 97.3 °F (36.3 °C) (Temporal)   Ht 5' 6" (1.676 m)   Wt 95.4 kg (210 lb 5.1 oz)   SpO2 (!) 92%   BMI 33.95 kg/m²     Physical Exam   Constitutional: She is oriented to person, place, and time. She appears well-developed.   HENT:   Mouth/Throat: Oropharynx is clear and moist.   Neck: Neck supple. Carotid bruit is not present. No thyroid mass present.   Cardiovascular: Normal rate, regular rhythm and intact distal pulses. Exam reveals no gallop and no friction rub.   No murmur heard.  Pulmonary/Chest: Effort normal and breath sounds normal. She has no wheezes. She has no rales.   Abdominal: Soft. Bowel sounds are normal. She exhibits no mass. There is no hepatosplenomegaly. There is no tenderness.   Musculoskeletal: She exhibits no edema.   Lymphadenopathy:     She has no cervical adenopathy.   Neurological: She is alert and " oriented to person, place, and time.   Psychiatric: She has a normal mood and affect.         Results for RAFAEL JIANG (MRN [id number]) as of 8/14/2019 15:05   Ref. Range 2/5/2019 14:55 7/23/2019 13:30 7/30/2019 09:25   Alkaline Phosphatase Latest Ref Range: 55 - 135 U/L 88  86   PROTEIN TOTAL Latest Ref Range: 6.0 - 8.4 g/dL 7.9  7.4   Albumin Latest Ref Range: 3.5 - 5.2 g/dL 4.2  4.5   BILIRUBIN TOTAL Latest Ref Range: 0.1 - 1.0 mg/dL 0.7  0.9   Bilirubin, Direct Latest Ref Range: 0.1 - 0.3 mg/dL   0.4 (H)   AST Latest Ref Range: 10 - 40 U/L 25  35   ALT Latest Ref Range: 10 - 44 U/L 52 (H)  75 (H)     Assessment:       1. Elevated liver enzymes    2. Anxiety    3. Malignant neoplasm of nipple of right breast in female, unspecified estrogen receptor status    4. Migraine without aura and without status migrainosus, not intractable    5. Obstructive sleep apnea syndrome          Plan:     Elevated liver enzymes- increase exercise, recheck in 4mo.  -     Hemoglobin A1c; Future; Expected date: 08/14/2019  -     Hepatic function panel; Future; Expected date: 08/14/2019    Anxiety- will follow, no rx.    Malignant neoplasm of nipple of right breast in female, unspecified estrogen receptor status- per [facility] center.    Migraine without aura and without status migrainosus, not intractable- cont prn rx.    Obstructive sleep apnea syndrome- needs new mask.

## 2019-08-24 DIAGNOSIS — Z85.3 HX: BREAST CANCER: Chronic | ICD-10-CM

## 2019-08-26 RX ORDER — LETROZOLE 2.5 MG/1
TABLET, FILM COATED ORAL
Qty: 30 TABLET | Refills: 5 | Status: SHIPPED | OUTPATIENT
Start: 2019-08-26 | End: 2020-01-29

## 2019-10-14 ENCOUNTER — OFFICE VISIT (OUTPATIENT)
Dept: URGENT CARE | Facility: CLINIC | Age: 59
End: 2019-10-14
Payer: MEDICAID

## 2019-10-14 VITALS
SYSTOLIC BLOOD PRESSURE: 140 MMHG | TEMPERATURE: 99 F | DIASTOLIC BLOOD PRESSURE: 80 MMHG | HEART RATE: 66 BPM | OXYGEN SATURATION: 98 % | WEIGHT: 190.5 LBS | BODY MASS INDEX: 30.74 KG/M2

## 2019-10-14 DIAGNOSIS — M62.838 MUSCLE SPASM: Primary | ICD-10-CM

## 2019-10-14 PROCEDURE — 99214 PR OFFICE/OUTPT VISIT, EST, LEVL IV, 30-39 MIN: ICD-10-PCS | Mod: S$PBB,,, | Performed by: NURSE PRACTITIONER

## 2019-10-14 PROCEDURE — 99999 PR PBB SHADOW E&M-EST. PATIENT-LVL III: ICD-10-PCS | Mod: PBBFAC,,, | Performed by: NURSE PRACTITIONER

## 2019-10-14 PROCEDURE — 99214 OFFICE O/P EST MOD 30 MIN: CPT | Mod: S$PBB,,, | Performed by: NURSE PRACTITIONER

## 2019-10-14 PROCEDURE — 99213 OFFICE O/P EST LOW 20 MIN: CPT | Mod: PBBFAC,PO | Performed by: NURSE PRACTITIONER

## 2019-10-14 PROCEDURE — 99999 PR PBB SHADOW E&M-EST. PATIENT-LVL III: CPT | Mod: PBBFAC,,, | Performed by: NURSE PRACTITIONER

## 2019-10-14 RX ORDER — BACLOFEN 10 MG/1
10 TABLET ORAL 3 TIMES DAILY
Qty: 15 TABLET | Refills: 0 | Status: SHIPPED | OUTPATIENT
Start: 2019-10-14 | End: 2022-02-02 | Stop reason: ALTCHOICE

## 2019-10-14 NOTE — PROGRESS NOTES
Subjective:       Patient ID: Samuel Kilgore is a 58 y.o. female.    Vitals:  weight is 86.4 kg (190 lb 7.6 oz). Her oral temperature is 98.5 °F (36.9 °C). Her blood pressure is 140/80 (abnormal) and her pulse is 66. Her oxygen saturation is 98%.     Chief Complaint: Back Pain    Chest Pain    This is a new problem. Episode onset: 2 weeks. The onset quality is sudden. The problem occurs intermittently. The problem has been waxing and waning. Pain location: lower scapular border on the right side and around to rib on the right side. Pain severity now: mild/mod. The quality of the pain is described as burning. Radiates to: from lower scapular border on the left side radiating around to chest on the left  side. Associated symptoms include back pain (left thoracic region posterior around to left front). Associated symptoms comments: Worse with movement. The pain is aggravated by lifting, lifting arm, movement, coughing and deep breathing. Treatments tried: oils (winter green) The treatment provided moderate relief. Risk factors include lack of exercise, sedentary lifestyle and obesity.       Cardiovascular: Positive for chest pain.   Musculoskeletal: Positive for back pain (left thoracic region posterior around to left front) and muscle ache.       Objective:      Physical Exam   Constitutional: She is oriented to person, place, and time. Vital signs are normal. She appears well-developed and well-nourished. She is cooperative. No distress.   HENT:   Head: Normocephalic.   Right Ear: Hearing normal.   Left Ear: Hearing normal.   Cardiovascular: Normal rate, regular rhythm, S1 normal, S2 normal and normal heart sounds.   Pulmonary/Chest: Effort normal and breath sounds normal. No accessory muscle usage. No tachypnea and no bradypnea. No respiratory distress. She exhibits tenderness (left thoracic region).   Musculoskeletal: Normal range of motion.        Thoracic back: She exhibits tenderness (left side), pain (left  side) and spasm (left side). She exhibits no bony tenderness, no swelling, no edema, no deformity, no laceration and normal pulse.   Neurological: She is alert and oriented to person, place, and time.   Skin: Skin is warm, dry and not diaphoretic. Capillary refill takes less than 2 seconds.   Nursing note and vitals reviewed.        Assessment:       1. Muscle spasm        Plan:         Muscle spasm  -     baclofen (LIORESAL) 10 MG tablet; Take 1 tablet (10 mg total) by mouth 3 (three) times daily. for 5 days  Dispense: 15 tablet; Refill: 0          Follow prescribed treatment plan  Apply warm compresses to affected area as advised    Take prescribed medications as directed.  Follow up with Primary Care Provider/Ortho

## 2019-10-14 NOTE — PATIENT INSTRUCTIONS
Muscle Spasm  A muscle spasm is a sudden tightening of the muscle you cant control. This may be caused by strain, overworking the muscle, or injury. It can also be caused by dehydration, electrolyte imbalance, diabetes, alcohol use, and certain medicines. If it goes on long enough the muscle spasm causes pain. Common areas for muscle spasm are the legs, neck, and back.  Home care  · Heat, massage, and stretching will help relax muscle spasm.  · When the spasm is in your arm or leg, stretch the muscle passively. To do this, have someone bend or straighten the joint above or below the muscle until you feel the stretch on the sore muscle. You can stretch the muscle actively by moving the affected body part. This will stretch the muscle that is in spasm. For example, if the spasm is in your calf, bend the ankle so your toes point upward toward your knee. This will stretch your calf muscle.  · You may use over-the-counter pain medicine to control pain, unless another medicine was prescribed. If you have chronic liver or kidney disease or ever had a stomach ulcer or GI bleeding, talk with your healthcare provider before using these medicines.  Follow-up care  Follow up with your healthcare provider, or as advised.    When to seek medical advice  Call your healthcare provider right away if any of the following occur:  · Fingers or toes become swollen, cold, blue, numb, or tingly  · You develop weakness in the affected arm or leg  · Pain increases and is not controlled by the above measures  Date Last Reviewed: 11/21/2015  © 6841-1119 [employer]. [address] [place] [address], [place] [address]. All rights reserved. This information is not intended as a substitute for professional medical care. Always follow your healthcare professional's instructions.

## 2019-11-01 ENCOUNTER — OFFICE VISIT (OUTPATIENT)
Dept: OPHTHALMOLOGY | Facility: CLINIC | Age: 59
End: 2019-11-01
Payer: MEDICAID

## 2019-11-01 DIAGNOSIS — M35.00 SICCA SYNDROME: ICD-10-CM

## 2019-11-01 DIAGNOSIS — Z79.899 LONG-TERM USE OF PLAQUENIL: Primary | ICD-10-CM

## 2019-11-01 PROCEDURE — 92004 COMPRE OPH EXAM NEW PT 1/>: CPT | Mod: S$PBB,,, | Performed by: OPTOMETRIST

## 2019-11-01 PROCEDURE — 99211 OFF/OP EST MAY X REQ PHY/QHP: CPT | Mod: PBBFAC | Performed by: OPTOMETRIST

## 2019-11-01 PROCEDURE — 99999 PR PBB SHADOW E&M-EST. PATIENT-LVL I: CPT | Mod: PBBFAC,,, | Performed by: OPTOMETRIST

## 2019-11-01 PROCEDURE — 99999 PR PBB SHADOW E&M-EST. PATIENT-LVL I: ICD-10-PCS | Mod: PBBFAC,,, | Performed by: OPTOMETRIST

## 2019-11-01 PROCEDURE — 92134 POSTERIOR SEGMENT OCT RETINA (OCULAR COHERENCE TOMOGRAPHY)-BOTH EYES: ICD-10-PCS | Mod: 26,S$PBB,, | Performed by: OPTOMETRIST

## 2019-11-01 PROCEDURE — 92083 HUMPHREY VISUAL FIELD - OU - BOTH EYES: ICD-10-PCS | Mod: 26,S$PBB,, | Performed by: OPTOMETRIST

## 2019-11-01 PROCEDURE — 92134 CPTRZ OPH DX IMG PST SGM RTA: CPT | Mod: PBBFAC | Performed by: OPTOMETRIST

## 2019-11-01 PROCEDURE — 92083 EXTENDED VISUAL FIELD XM: CPT | Mod: PBBFAC | Performed by: OPTOMETRIST

## 2019-11-01 PROCEDURE — 92004 PR EYE EXAM, NEW PATIENT,COMPREHESV: ICD-10-PCS | Mod: S$PBB,,, | Performed by: OPTOMETRIST

## 2019-11-04 NOTE — PROGRESS NOTES
HPI     Eye Exam      Additional comments: Plaquenil check              Comments     Pt here for plaquenil baseline examination  Dose Unsure yet  Started Today for Sjsenaitrens   Any recent change in vision noticed? No  HPI    Any vision changes since last exam: No   Eye pain: No  Other ocular symptoms: No    Do you wear currently wear glasses or contacts? SVL glasses    Interested in contacts today? No    Do you plan on getting new glasses today? If needed              Last edited by Shelbi Koo, PCT on 11/1/2019  2:03 PM. (History)              Assessment /Plan     For exam results, see Encounter Report.    Long-term use of Plaquenil  -     Peres Visual Field - OU - Extended - Both Eyes  -     Posterior Segment OCT Retina-Both eyes    Sicca syndrome  -     Peres Visual Field - OU - Extended - Both Eyes  -     Posterior Segment OCT Retina-Both eyes      Normal baseline exam today OD, OS  No pre existing macular disease  Normal 10-2 VF and mOCT today OU  Monitor 12 months      Stressed importance of follow up visits with pt.  RTC 12 months for dilation,10-2 VF, and mOCT.   PRN sooner if any va changes.

## 2019-11-26 NOTE — PROGRESS NOTES
"Subjective:      Patient ID: Samuel Kilgore is a 59 y.o. female.    Chief Complaint: Follow-up (4 months )      HPI  Here for follow up of medical problems.  Derm has diagnosed Sjogrens (instead of SLE), for hair falling out.  Seeing Rheum Dr. [place].  Lost another 36#, total now 60#.  Was losing hair prior to her weight loss on Optivia.  Less stress lately.  Off effexor.  Migraines much less now, less than 2 per month.    BMs ok, drinks fluids and fiber pills.    Updated/ annual due 2/20:  HM:  1/18 [id number], 3/16 [id number], 4/16 BMD, 2/19 TDaP, 11/18 MMG no more due to reconstruction flap/ follows [facility], 9/12 Cscope rep 10y, Rheum Dr. Harris.     Review of Systems   Constitutional: Negative for chills, diaphoresis and fever.   Respiratory: Negative for cough and shortness of breath.    Cardiovascular: Negative for chest pain, palpitations and leg swelling.   Gastrointestinal: Negative for blood in stool, constipation, diarrhea, nausea and vomiting.   Genitourinary: Negative for dysuria, frequency and hematuria.   Psychiatric/Behavioral: The patient is not nervous/anxious.          Objective:   /82 (BP Location: Left arm, Patient Position: Sitting, BP Method: Medium (Manual))   Pulse 90   Temp 97.8 °F (36.6 °C) (Oral)   Ht 5' 6" (1.676 m)   Wt 79 kg (174 lb 2.6 oz)   SpO2 96%   BMI 28.11 kg/m²     Physical Exam   Constitutional: She is oriented to person, place, and time. She appears well-developed.   HENT:   Mouth/Throat: Oropharynx is clear and moist.   Neck: Neck supple. Carotid bruit is not present. No thyroid mass present.   Cardiovascular: Normal rate, regular rhythm and intact distal pulses. Exam reveals no gallop and no friction rub.   No murmur heard.  Pulmonary/Chest: Effort normal and breath sounds normal. She has no wheezes. She has no rales.   Abdominal: Soft. Bowel sounds are normal. She exhibits no mass. There is no hepatosplenomegaly. There is no tenderness. "   Musculoskeletal: She exhibits no edema.   Lymphadenopathy:     She has no cervical adenopathy.   Neurological: She is alert and oriented to person, place, and time.   Psychiatric: She has a normal mood and affect.       Results for RAFAEL JIANG (MRN [id number]) as of 12/10/2019 15:06   Ref. Range 2/5/2019 14:55 7/23/2019 13:30 7/30/2019 09:25 12/3/2019 08:14   Alkaline Phosphatase Latest Ref Range: 55 - 135 U/L 88  86 77   PROTEIN TOTAL Latest Ref Range: 6.0 - 8.4 g/dL 7.9  7.4 7.4   Albumin Latest Ref Range: 3.5 - 5.2 g/dL 4.2  4.5 4.7   BILIRUBIN TOTAL Latest Ref Range: 0.1 - 1.0 mg/dL 0.7  0.9 0.8   Bilirubin, Direct Latest Ref Range: 0.1 - 0.3 mg/dL   0.4 (H) 0.3   AST Latest Ref Range: 10 - 40 U/L 25  35 23   ALT Latest Ref Range: 10 - 44 U/L 52 (H)  75 (H) 40       Assessment:       1. Elevated liver enzymes    2. Anxiety    3. Migraine without aura and without status migrainosus, not intractable    4. Preventive measure    5. Sjogren's syndrome without extraglandular involvement    6. Vertigo          Plan:     Elevated liver enzymes- resolved with wt loss.    Occas vertigo for many years- meclizine prn.    Anxiety- doing well, no rx now.    Migraine without aura and without status migrainosus, not intractable- doing better.    Preventive measure- due in 4mo-  -     CBC auto differential; Future; Expected date: 12/10/2019  -     Comprehensive metabolic panel; Future; Expected date: 12/10/2019  -     Lipid panel; Future; Expected date: 12/10/2019  -     TSH; Future; Expected date: 12/10/2019  -     Vitamin D; Future  -     Ambulatory consult to Gynecology    Sjogren's syndrome without extraglandular involvement- per Derm, to start plaquenil.

## 2019-12-03 ENCOUNTER — LAB VISIT (OUTPATIENT)
Dept: LAB | Facility: HOSPITAL | Age: 59
End: 2019-12-03
Attending: INTERNAL MEDICINE
Payer: MEDICAID

## 2019-12-03 DIAGNOSIS — R74.8 ELEVATED LIVER ENZYMES: ICD-10-CM

## 2019-12-03 LAB
ESTIMATED AVG GLUCOSE: 100 MG/DL (ref 68–131)
HBA1C MFR BLD HPLC: 5.1 % (ref 4–5.6)

## 2019-12-03 PROCEDURE — 83036 HEMOGLOBIN GLYCOSYLATED A1C: CPT

## 2019-12-03 PROCEDURE — 80076 HEPATIC FUNCTION PANEL: CPT

## 2019-12-03 PROCEDURE — 36415 COLL VENOUS BLD VENIPUNCTURE: CPT | Mod: PO

## 2019-12-04 LAB
ALBUMIN SERPL BCP-MCNC: 4.7 G/DL (ref 3.5–5.2)
ALP SERPL-CCNC: 77 U/L (ref 55–135)
ALT SERPL W/O P-5'-P-CCNC: 40 U/L (ref 10–44)
AST SERPL-CCNC: 23 U/L (ref 10–40)
BILIRUB DIRECT SERPL-MCNC: 0.3 MG/DL (ref 0.1–0.3)
BILIRUB SERPL-MCNC: 0.8 MG/DL (ref 0.1–1)
PROT SERPL-MCNC: 7.4 G/DL (ref 6–8.4)

## 2019-12-10 ENCOUNTER — OFFICE VISIT (OUTPATIENT)
Dept: FAMILY MEDICINE | Facility: CLINIC | Age: 59
End: 2019-12-10
Payer: MEDICAID

## 2019-12-10 VITALS
OXYGEN SATURATION: 96 % | TEMPERATURE: 98 F | BODY MASS INDEX: 27.99 KG/M2 | WEIGHT: 174.19 LBS | SYSTOLIC BLOOD PRESSURE: 126 MMHG | HEART RATE: 90 BPM | HEIGHT: 66 IN | DIASTOLIC BLOOD PRESSURE: 82 MMHG

## 2019-12-10 DIAGNOSIS — G43.009 MIGRAINE WITHOUT AURA AND WITHOUT STATUS MIGRAINOSUS, NOT INTRACTABLE: Chronic | ICD-10-CM

## 2019-12-10 DIAGNOSIS — F41.9 ANXIETY: Chronic | ICD-10-CM

## 2019-12-10 DIAGNOSIS — R42 VERTIGO: ICD-10-CM

## 2019-12-10 DIAGNOSIS — Z29.9 PREVENTIVE MEASURE: ICD-10-CM

## 2019-12-10 DIAGNOSIS — M35.00 SJOGREN'S SYNDROME WITHOUT EXTRAGLANDULAR INVOLVEMENT: ICD-10-CM

## 2019-12-10 DIAGNOSIS — R74.8 ELEVATED LIVER ENZYMES: Primary | ICD-10-CM

## 2019-12-10 PROCEDURE — 99213 OFFICE O/P EST LOW 20 MIN: CPT | Mod: PBBFAC,PO | Performed by: INTERNAL MEDICINE

## 2019-12-10 PROCEDURE — 99999 PR PBB SHADOW E&M-EST. PATIENT-LVL III: ICD-10-PCS | Mod: PBBFAC,,, | Performed by: INTERNAL MEDICINE

## 2019-12-10 PROCEDURE — 99999 PR PBB SHADOW E&M-EST. PATIENT-LVL III: CPT | Mod: PBBFAC,,, | Performed by: INTERNAL MEDICINE

## 2019-12-10 PROCEDURE — 99214 OFFICE O/P EST MOD 30 MIN: CPT | Mod: S$PBB,,, | Performed by: INTERNAL MEDICINE

## 2019-12-10 PROCEDURE — 99214 PR OFFICE/OUTPT VISIT, EST, LEVL IV, 30-39 MIN: ICD-10-PCS | Mod: S$PBB,,, | Performed by: INTERNAL MEDICINE

## 2019-12-10 RX ORDER — HYDROXYCHLOROQUINE SULFATE 200 MG/1
TABLET, FILM COATED ORAL
COMMUNITY
Start: 2019-12-02 | End: 2021-10-19

## 2019-12-10 RX ORDER — RIZATRIPTAN BENZOATE 10 MG/1
10 TABLET ORAL ONCE AS NEEDED
Qty: 27 TABLET | Refills: 1 | Status: SHIPPED | OUTPATIENT
Start: 2019-12-10 | End: 2020-11-04 | Stop reason: SDUPTHER

## 2019-12-10 RX ORDER — GABAPENTIN 300 MG/1
CAPSULE ORAL
COMMUNITY
End: 2021-10-19

## 2019-12-10 RX ORDER — ZONISAMIDE 100 MG/1
CAPSULE ORAL
COMMUNITY
End: 2022-02-02 | Stop reason: ALTCHOICE

## 2019-12-10 RX ORDER — MECLIZINE HCL 12.5 MG 12.5 MG/1
12.5-25 TABLET ORAL 3 TIMES DAILY PRN
Qty: 40 TABLET | Refills: 1 | Status: SHIPPED | OUTPATIENT
Start: 2019-12-10 | End: 2021-10-19 | Stop reason: SDUPTHER

## 2019-12-10 RX ORDER — VENLAFAXINE HYDROCHLORIDE 37.5 MG/1
CAPSULE, EXTENDED RELEASE ORAL
COMMUNITY
End: 2021-10-19

## 2019-12-10 RX ORDER — TOPIRAMATE 100 MG/1
TABLET, FILM COATED ORAL
COMMUNITY
End: 2021-10-19

## 2019-12-10 RX ORDER — TAMOXIFEN CITRATE 20 MG/1
TABLET ORAL
COMMUNITY
End: 2021-10-19

## 2019-12-10 RX ORDER — ESCITALOPRAM OXALATE 20 MG/1
TABLET ORAL
COMMUNITY
End: 2021-10-19

## 2019-12-10 RX ORDER — ESZOPICLONE 2 MG/1
TABLET, FILM COATED ORAL
COMMUNITY
End: 2022-02-02 | Stop reason: ALTCHOICE

## 2019-12-10 RX ORDER — ZOLMITRIPTAN 5 MG/1
TABLET, FILM COATED ORAL
COMMUNITY
End: 2021-10-19

## 2019-12-10 RX ORDER — TRAZODONE HYDROCHLORIDE 150 MG/1
TABLET ORAL
COMMUNITY
End: 2021-10-19

## 2019-12-26 ENCOUNTER — PATIENT MESSAGE (OUTPATIENT)
Dept: HEMATOLOGY/ONCOLOGY | Facility: CLINIC | Age: 59
End: 2019-12-26

## 2020-01-03 ENCOUNTER — PATIENT MESSAGE (OUTPATIENT)
Dept: HEMATOLOGY/ONCOLOGY | Facility: CLINIC | Age: 60
End: 2020-01-03

## 2020-01-03 ENCOUNTER — TELEPHONE (OUTPATIENT)
Dept: HEMATOLOGY/ONCOLOGY | Facility: CLINIC | Age: 60
End: 2020-01-03

## 2020-01-03 NOTE — TELEPHONE ENCOUNTER
Patient returning calls to my previous calls.   Patient rescheduled to the following Wednesday jan 22, 2020 to see Dr. Downey.   She expressed gratitude.    New appt slip mailed out for confirmation.

## 2020-01-03 NOTE — TELEPHONE ENCOUNTER
Attempted to reach patient to discuss appointment with  on 1/15 to discuss moving to another time or alternative date.  No answer. Voicemail left requesting return call. Clinic number provider. My [facility] message also sent, awaiting response or call back.   Will tentatively reschedule with one of the NPs near same time.

## 2020-01-03 NOTE — TELEPHONE ENCOUNTER
----- Message from Avani Waddell sent at 1/3/2020  3:50 PM CST -----  Contact: Patient  Returning a call     Caller name:: Pt    Who Left Message for Patient:: Lisbeth    Communication preference:: [phone]    Additional info::

## 2020-01-22 ENCOUNTER — OFFICE VISIT (OUTPATIENT)
Dept: HEMATOLOGY/ONCOLOGY | Facility: CLINIC | Age: 60
End: 2020-01-22
Payer: MEDICAID

## 2020-01-22 VITALS
HEIGHT: 66 IN | WEIGHT: 163.38 LBS | SYSTOLIC BLOOD PRESSURE: 126 MMHG | TEMPERATURE: 98 F | RESPIRATION RATE: 16 BRPM | DIASTOLIC BLOOD PRESSURE: 84 MMHG | OXYGEN SATURATION: 98 % | HEART RATE: 71 BPM | BODY MASS INDEX: 26.26 KG/M2

## 2020-01-22 DIAGNOSIS — C50.611 MALIGNANT NEOPLASM OF AXILLARY TAIL OF RIGHT BREAST IN FEMALE, ESTROGEN RECEPTOR POSITIVE: Primary | ICD-10-CM

## 2020-01-22 DIAGNOSIS — Z79.811 PROPHYLACTIC USE OF LETROZOLE (FEMARA): ICD-10-CM

## 2020-01-22 DIAGNOSIS — Z17.0 MALIGNANT NEOPLASM OF AXILLARY TAIL OF RIGHT BREAST IN FEMALE, ESTROGEN RECEPTOR POSITIVE: Primary | ICD-10-CM

## 2020-01-22 PROCEDURE — 99999 PR PBB SHADOW E&M-EST. PATIENT-LVL IV: CPT | Mod: PBBFAC,,, | Performed by: INTERNAL MEDICINE

## 2020-01-22 PROCEDURE — 99214 OFFICE O/P EST MOD 30 MIN: CPT | Mod: PBBFAC | Performed by: INTERNAL MEDICINE

## 2020-01-22 PROCEDURE — 99213 OFFICE O/P EST LOW 20 MIN: CPT | Mod: S$PBB,,, | Performed by: INTERNAL MEDICINE

## 2020-01-22 PROCEDURE — 99213 PR OFFICE/OUTPT VISIT, EST, LEVL III, 20-29 MIN: ICD-10-PCS | Mod: S$PBB,,, | Performed by: INTERNAL MEDICINE

## 2020-01-22 PROCEDURE — 99999 PR PBB SHADOW E&M-EST. PATIENT-LVL IV: ICD-10-PCS | Mod: PBBFAC,,, | Performed by: INTERNAL MEDICINE

## 2020-01-22 NOTE — PROGRESS NOTES
Subjective:       Patient ID: Samuel Kilgore is a 59 y.o. female.    Chief Complaint: Follow-up    HPI   Mrs. Kilgore returns today for follow up.  As of March 2015  she has been on letrozole in the extended adjuvant setting.  Her most recent DXA scan last July had shown normal bone density of the hip and the L spine.    Briefly, she is a 57-year-old female status post bilateral mastectomies, status post adjuvant chemotherapy, currently on adjuvant letrozole for her stage II breast cancer (T1 N1 M0).   Her cancer was ER positive, AR positive, and HER-2 negative. Her chemotherapy consisted of 4 cycles of dose-dense AC followed by four cycle of DD Taxol. She then took tamoxifen x 5 years.  She was started on letrozole in March 2016.    Review of Systems  Overall she feels well, and she has no complaints other than mild hair loss.  ECOG performance status is 1.  Of note, she has lost approximately 80 lbs, however, the weight loss was intentional.  She denies any bruising, fevers, chills, night sweats, weight loss, nausea, vomiting, diarrhea, constipation, diplopia, blurred vision, headache, chest pain, right breast pain, or abdominal pain.    Objective:      Physical Exam   GENERAL: She is alert, oriented to time, place, person, pleasant, well nourished, in no acute physical distress.   VITAL SIGNS: Reviewed.   HEENT: Normal. There are no nasal, oral, lip, gingival, auricular, lid, or conjunctival lesions. Mucosae are moist and pink, and there is no thrush. Pupils are equal, reactive to light and accommodation. Extraocular muscle movements are intact.   NECK: Supple without JVD, adenopathy, or thyromegaly.   LUNGS: Clear to auscultation without wheezing, rales, or rhonchi.   CARDIOVASCULAR: Reveals an S1, S2, no murmurs, no rubs, and no gallops.   BREASTS: She is status post bilateral nipple-sparing mastectomies with no evidence of chest wall recurrence. There are no masses in either breast. There is no induration or  tenderness in either breast.   ABDOMEN: Soft, obese, nontender, without organomegaly. Bowel sounds are present.   EXTREMITIES: No cyanosis, clubbing, or edema.   SKIN: Does not have petechiae, rashes, induration, or ecchymoses.   NEUROLOGIC: Motor function is 5/5, DTRs are 0-1+ bilaterally, symmetrical, and cranial nerves within normal.     Assessment:       1. Malignant neoplasm of axillary tail of right breast in female, estrogen receptor positive    2. Adjuvant use of letrozole (Femara)     Plan:            Mrs. Cornelius is doing well and remains HANK 9 years from her original diagnosis.  I have asked her to remain on letrozole, which she will take through early March 2021.    I will see her again in 6 months.  Her questions were answered to her satisfaction.

## 2020-01-29 DIAGNOSIS — Z85.3 HX: BREAST CANCER: Chronic | ICD-10-CM

## 2020-01-29 RX ORDER — LETROZOLE 2.5 MG/1
TABLET, FILM COATED ORAL
Qty: 90 TABLET | Refills: 2 | Status: SHIPPED | OUTPATIENT
Start: 2020-01-29 | End: 2020-11-16

## 2020-04-27 ENCOUNTER — PATIENT MESSAGE (OUTPATIENT)
Dept: FAMILY MEDICINE | Facility: CLINIC | Age: 60
End: 2020-04-27

## 2020-05-04 ENCOUNTER — TELEPHONE (OUTPATIENT)
Dept: FAMILY MEDICINE | Facility: CLINIC | Age: 60
End: 2020-05-04

## 2020-05-04 NOTE — TELEPHONE ENCOUNTER
----- Message from Ally Barkley sent at 5/4/2020 11:52 AM CDT -----  Contact: self, [phone]  Patient requests to speak with you. Did not wish to specify why, states she has questions for her dr. Please advise.

## 2020-05-05 ENCOUNTER — TELEPHONE (OUTPATIENT)
Dept: FAMILY MEDICINE | Facility: CLINIC | Age: 60
End: 2020-05-05

## 2020-05-05 NOTE — TELEPHONE ENCOUNTER
Pt would like to know what you would consider her risk level is due to age and medical hx in relation to public restrictions being lessened.   Any advice?/rpr

## 2020-05-05 NOTE — TELEPHONE ENCOUNTER
Well you would be more risky due to autoimmune disease, but your hydroxychloroquine may offer some protection from getting the virus.  You need to stay vigilant with hand washing and not touching face and wearing mask and social distancing.  I would like you to work from home if possible.  SM

## 2020-07-16 ENCOUNTER — OFFICE VISIT (OUTPATIENT)
Dept: HEMATOLOGY/ONCOLOGY | Facility: CLINIC | Age: 60
End: 2020-07-16
Payer: MEDICAID

## 2020-07-16 VITALS
TEMPERATURE: 98 F | RESPIRATION RATE: 16 BRPM | BODY MASS INDEX: 24.84 KG/M2 | HEIGHT: 66 IN | SYSTOLIC BLOOD PRESSURE: 122 MMHG | DIASTOLIC BLOOD PRESSURE: 72 MMHG | WEIGHT: 154.56 LBS | OXYGEN SATURATION: 97 % | HEART RATE: 68 BPM

## 2020-07-16 DIAGNOSIS — E55.9 MILD VITAMIN D DEFICIENCY: Chronic | ICD-10-CM

## 2020-07-16 DIAGNOSIS — Z17.0 MALIGNANT NEOPLASM OF RIGHT BREAST IN FEMALE, ESTROGEN RECEPTOR POSITIVE, UNSPECIFIED SITE OF BREAST: Primary | ICD-10-CM

## 2020-07-16 DIAGNOSIS — C50.911 MALIGNANT NEOPLASM OF RIGHT BREAST IN FEMALE, ESTROGEN RECEPTOR POSITIVE, UNSPECIFIED SITE OF BREAST: Primary | ICD-10-CM

## 2020-07-16 DIAGNOSIS — Z79.811 PROPHYLACTIC USE OF LETROZOLE (FEMARA): ICD-10-CM

## 2020-07-16 PROCEDURE — 99999 PR PBB SHADOW E&M-EST. PATIENT-LVL V: CPT | Mod: PBBFAC,,, | Performed by: NURSE PRACTITIONER

## 2020-07-16 PROCEDURE — 99214 PR OFFICE/OUTPT VISIT, EST, LEVL IV, 30-39 MIN: ICD-10-PCS | Mod: S$PBB,,, | Performed by: NURSE PRACTITIONER

## 2020-07-16 PROCEDURE — 99214 OFFICE O/P EST MOD 30 MIN: CPT | Mod: S$PBB,,, | Performed by: NURSE PRACTITIONER

## 2020-07-16 PROCEDURE — 99215 OFFICE O/P EST HI 40 MIN: CPT | Mod: PBBFAC | Performed by: NURSE PRACTITIONER

## 2020-07-16 PROCEDURE — 99999 PR PBB SHADOW E&M-EST. PATIENT-LVL V: ICD-10-PCS | Mod: PBBFAC,,, | Performed by: NURSE PRACTITIONER

## 2020-07-16 NOTE — PROGRESS NOTES
Subjective:       Patient ID: Samuel Kilgore is a 59 y.o. female.    Chief Complaint: Follow-up    Follow-up       Mrs. Kilgore returns today for follow up.  As of March 2016  she has been on letrozole in the extended adjuvant setting.  Her most recent DXA scan July 2019 had shown normal bone density of the hip and the L spine.    Briefly, she is a 59-year-old female status post bilateral mastectomies, status post adjuvant chemotherapy, currently on adjuvant letrozole for her stage II breast cancer (T1 N1 M0).   Her cancer was ER positive, ID positive, and HER-2 negative. Her chemotherapy consisted of 4 cycles of dose-dense AC followed by four cycle of DD Taxol. She then took tamoxifen x 5 years.  She was started on letrozole in March 2016.    Review of Systems  Overall she feels well, and no complaints. She continues to lose weight on optavia. Activity good.    Of note, she has lost approximately 90 lbs, however, the weight loss was intentional.  She denies any bruising, fevers, chills, night sweats, weight loss, nausea, vomiting, diarrhea, constipation, diplopia, blurred vision, headache, chest pain, right breast pain, or abdominal pain.    Objective:      Physical Exam   GENERAL: She is alert, oriented to time, place, person, pleasant, well nourished, in no acute physical distress.   VITAL SIGNS: Reviewed.   HEENT: Normal. There are no nasal, oral, lip, gingival, auricular, lid, or conjunctival lesions. Mucosae are moist and pink, and there is no thrush. Pupils are equal, reactive to light and accommodation. Extraocular muscle movements are intact.   NECK: Supple without JVD, adenopathy, or thyromegaly.   LUNGS: Clear to auscultation without wheezing, rales, or rhonchi.   CARDIOVASCULAR: Reveals an S1, S2, no murmurs, no rubs, and no gallops.   BREASTS: She is status post bilateral mastectomies with no evidence of chest wall recurrence. There are no masses in either breast. There is no induration or tenderness  in either breast.   ABDOMEN: Soft, obese, nontender, without organomegaly. Bowel sounds are present.   EXTREMITIES: No cyanosis, clubbing, or edema.   SKIN: Does not have petechiae, rashes, induration, or ecchymoses.       Assessment:       1. Malignant neoplasm of right breast in female, estrogen receptor positive, unspecified site of breast     2. Prophylactic use of letrozole (Femara)     3. Mild vitamin D deficiency         Plan:            Doing well, clinically HANK.   Continue letrozole, which she will take through early March 2021.    Continue Vit D  BMD 7/2021  RTC  in 6 months.  Continue with weightloss on optavia.  Her questions were answered to her satisfaction.  Continue to follow up with PCP for other health maintenance. Reminded the need for Vit D yearly.        Patient is in agreement with the proposed treatment plan. All questions were answered to the patient's satisfaction. Pt knows to call clinic for any new or worsening symptoms and if anything is needed before the next clinic visit.      NILES GuerreroP-C  Hematology & Oncology  [facility] [place] [address]  ph. [phone]  Fax. [phone]     I spent 30 minutes (face to face) with the patient, more than 50% was in counseling and coordination of care as detailed above.

## 2020-11-04 DIAGNOSIS — G43.009 MIGRAINE WITHOUT AURA AND WITHOUT STATUS MIGRAINOSUS, NOT INTRACTABLE: Primary | ICD-10-CM

## 2020-11-04 RX ORDER — RIZATRIPTAN BENZOATE 10 MG/1
10 TABLET ORAL ONCE AS NEEDED
Qty: 27 TABLET | Refills: 1 | Status: SHIPPED | OUTPATIENT
Start: 2020-11-04 | End: 2021-06-02

## 2020-11-06 ENCOUNTER — PATIENT OUTREACH (OUTPATIENT)
Dept: ADMINISTRATIVE | Facility: OTHER | Age: 60
End: 2020-11-06

## 2020-11-06 NOTE — PROGRESS NOTES
Health Maintenance Due   Topic Date Due    HIV Screening  11/26/1975    Pap Smear  10/11/2016    Influenza Vaccine (1) 08/01/2020     Updates were requested from care everywhere.  Chart was reviewed for overdue Proactive [facility] Encounters (URIEL) topics (CRS, Breast Cancer Screening, Eye exam)  Health Maintenance has been updated.  LINKS immunization registry triggered.  LINKS not responding.

## 2020-12-17 ENCOUNTER — LAB VISIT (OUTPATIENT)
Dept: PRIMARY CARE CLINIC | Facility: OTHER | Age: 60
End: 2020-12-17
Attending: INTERNAL MEDICINE
Payer: MEDICAID

## 2020-12-17 DIAGNOSIS — Z03.818 ENCOUNTER FOR OBSERVATION FOR SUSPECTED EXPOSURE TO OTHER BIOLOGICAL AGENTS RULED OUT: ICD-10-CM

## 2020-12-17 DIAGNOSIS — Z03.818 ENCNTR FOR OBS FOR SUSP EXPSR TO OTH BIOLG AGENTS RULED OUT: Primary | ICD-10-CM

## 2020-12-17 PROCEDURE — U0003 INFECTIOUS AGENT DETECTION BY NUCLEIC ACID (DNA OR RNA); SEVERE ACUTE RESPIRATORY SYNDROME CORONAVIRUS 2 (SARS-COV-2) (CORONAVIRUS DISEASE [COVID-19]), AMPLIFIED PROBE TECHNIQUE, MAKING USE OF HIGH THROUGHPUT TECHNOLOGIES AS DESCRIBED BY CMS-2020-01-R: HCPCS

## 2020-12-18 LAB — SARS-COV-2 RNA RESP QL NAA+PROBE: NOT DETECTED

## 2021-02-26 ENCOUNTER — TELEPHONE (OUTPATIENT)
Dept: OPHTHALMOLOGY | Facility: CLINIC | Age: 61
End: 2021-02-26

## 2021-03-25 ENCOUNTER — PATIENT MESSAGE (OUTPATIENT)
Dept: ADMINISTRATIVE | Facility: HOSPITAL | Age: 61
End: 2021-03-25

## 2021-09-10 ENCOUNTER — TELEPHONE (OUTPATIENT)
Dept: HEMATOLOGY/ONCOLOGY | Facility: CLINIC | Age: 61
End: 2021-09-10

## 2021-10-18 ENCOUNTER — OFFICE VISIT (OUTPATIENT)
Dept: HEMATOLOGY/ONCOLOGY | Facility: CLINIC | Age: 61
End: 2021-10-18
Payer: MEDICAID

## 2021-10-18 VITALS
OXYGEN SATURATION: 97 % | HEART RATE: 70 BPM | TEMPERATURE: 98 F | WEIGHT: 189.81 LBS | RESPIRATION RATE: 18 BRPM | DIASTOLIC BLOOD PRESSURE: 74 MMHG | BODY MASS INDEX: 30.51 KG/M2 | SYSTOLIC BLOOD PRESSURE: 154 MMHG | HEIGHT: 66 IN

## 2021-10-18 DIAGNOSIS — Z17.0 MALIGNANT NEOPLASM OF AXILLARY TAIL OF RIGHT BREAST IN FEMALE, ESTROGEN RECEPTOR POSITIVE: Primary | ICD-10-CM

## 2021-10-18 DIAGNOSIS — C50.611 MALIGNANT NEOPLASM OF AXILLARY TAIL OF RIGHT BREAST IN FEMALE, ESTROGEN RECEPTOR POSITIVE: Primary | ICD-10-CM

## 2021-10-18 PROCEDURE — 99213 OFFICE O/P EST LOW 20 MIN: CPT | Mod: S$PBB,,, | Performed by: INTERNAL MEDICINE

## 2021-10-18 PROCEDURE — 99215 OFFICE O/P EST HI 40 MIN: CPT | Mod: PBBFAC | Performed by: INTERNAL MEDICINE

## 2021-10-18 PROCEDURE — 99999 PR PBB SHADOW E&M-EST. PATIENT-LVL V: ICD-10-PCS | Mod: PBBFAC,,, | Performed by: INTERNAL MEDICINE

## 2021-10-18 PROCEDURE — 99213 PR OFFICE/OUTPT VISIT, EST, LEVL III, 20-29 MIN: ICD-10-PCS | Mod: S$PBB,,, | Performed by: INTERNAL MEDICINE

## 2021-10-18 PROCEDURE — 99999 PR PBB SHADOW E&M-EST. PATIENT-LVL V: CPT | Mod: PBBFAC,,, | Performed by: INTERNAL MEDICINE

## 2021-10-18 RX ORDER — NAPROXEN 500 MG/1
TABLET ORAL
COMMUNITY

## 2021-10-19 ENCOUNTER — OFFICE VISIT (OUTPATIENT)
Dept: FAMILY MEDICINE | Facility: CLINIC | Age: 61
End: 2021-10-19
Payer: MEDICAID

## 2021-10-19 ENCOUNTER — LAB VISIT (OUTPATIENT)
Dept: LAB | Facility: HOSPITAL | Age: 61
End: 2021-10-19
Payer: MEDICAID

## 2021-10-19 VITALS
TEMPERATURE: 98 F | DIASTOLIC BLOOD PRESSURE: 80 MMHG | SYSTOLIC BLOOD PRESSURE: 120 MMHG | HEIGHT: 66 IN | BODY MASS INDEX: 29.76 KG/M2 | RESPIRATION RATE: 18 BRPM | HEART RATE: 80 BPM | OXYGEN SATURATION: 95 % | WEIGHT: 185.19 LBS

## 2021-10-19 DIAGNOSIS — E55.9 MILD VITAMIN D DEFICIENCY: Chronic | ICD-10-CM

## 2021-10-19 DIAGNOSIS — M35.00 SJOGREN'S SYNDROME WITHOUT EXTRAGLANDULAR INVOLVEMENT: ICD-10-CM

## 2021-10-19 DIAGNOSIS — F41.9 ANXIETY: Chronic | ICD-10-CM

## 2021-10-19 DIAGNOSIS — Z00.00 ENCOUNTER FOR PREVENTIVE HEALTH EXAMINATION: ICD-10-CM

## 2021-10-19 DIAGNOSIS — Z00.00 ENCOUNTER FOR PREVENTIVE HEALTH EXAMINATION: Primary | ICD-10-CM

## 2021-10-19 DIAGNOSIS — G43.009 MIGRAINE WITHOUT AURA AND WITHOUT STATUS MIGRAINOSUS, NOT INTRACTABLE: ICD-10-CM

## 2021-10-19 DIAGNOSIS — Z85.3 HISTORY OF BREAST CANCER: ICD-10-CM

## 2021-10-19 LAB
25(OH)D3+25(OH)D2 SERPL-MCNC: 58 NG/ML (ref 30–96)
ALBUMIN SERPL BCP-MCNC: 4.6 G/DL (ref 3.5–5.2)
ALP SERPL-CCNC: 72 U/L (ref 55–135)
ALT SERPL W/O P-5'-P-CCNC: 21 U/L (ref 10–44)
ANION GAP SERPL CALC-SCNC: 9 MMOL/L (ref 8–16)
AST SERPL-CCNC: 17 U/L (ref 10–40)
BASOPHILS # BLD AUTO: 0.03 K/UL (ref 0–0.2)
BASOPHILS NFR BLD: 0.5 % (ref 0–1.9)
BILIRUB SERPL-MCNC: 1.1 MG/DL (ref 0.1–1)
BUN SERPL-MCNC: 18 MG/DL (ref 6–20)
CALCIUM SERPL-MCNC: 10.3 MG/DL (ref 8.7–10.5)
CHLORIDE SERPL-SCNC: 103 MMOL/L (ref 95–110)
CHOLEST SERPL-MCNC: 202 MG/DL (ref 120–199)
CHOLEST/HDLC SERPL: 2.9 {RATIO} (ref 2–5)
CO2 SERPL-SCNC: 30 MMOL/L (ref 23–29)
CREAT SERPL-MCNC: 0.8 MG/DL (ref 0.5–1.4)
DIFFERENTIAL METHOD: NORMAL
EOSINOPHIL # BLD AUTO: 0.1 K/UL (ref 0–0.5)
EOSINOPHIL NFR BLD: 1.6 % (ref 0–8)
ERYTHROCYTE [DISTWIDTH] IN BLOOD BY AUTOMATED COUNT: 12.4 % (ref 11.5–14.5)
EST. GFR  (AFRICAN AMERICAN): >60 ML/MIN/1.73 M^2
EST. GFR  (NON AFRICAN AMERICAN): >60 ML/MIN/1.73 M^2
ESTIMATED AVG GLUCOSE: 100 MG/DL (ref 68–131)
GLUCOSE SERPL-MCNC: 86 MG/DL (ref 70–110)
HBA1C MFR BLD: 5.1 % (ref 4–5.6)
HCT VFR BLD AUTO: 41.9 % (ref 37–48.5)
HDLC SERPL-MCNC: 70 MG/DL (ref 40–75)
HDLC SERPL: 34.7 % (ref 20–50)
HGB BLD-MCNC: 13.7 G/DL (ref 12–16)
IMM GRANULOCYTES # BLD AUTO: 0.01 K/UL (ref 0–0.04)
IMM GRANULOCYTES NFR BLD AUTO: 0.2 % (ref 0–0.5)
LDLC SERPL CALC-MCNC: 111.2 MG/DL (ref 63–159)
LYMPHOCYTES # BLD AUTO: 1.7 K/UL (ref 1–4.8)
LYMPHOCYTES NFR BLD: 29.6 % (ref 18–48)
MCH RBC QN AUTO: 30.4 PG (ref 27–31)
MCHC RBC AUTO-ENTMCNC: 32.7 G/DL (ref 32–36)
MCV RBC AUTO: 93 FL (ref 82–98)
MONOCYTES # BLD AUTO: 0.5 K/UL (ref 0.3–1)
MONOCYTES NFR BLD: 8.2 % (ref 4–15)
NEUTROPHILS # BLD AUTO: 3.4 K/UL (ref 1.8–7.7)
NEUTROPHILS NFR BLD: 59.9 % (ref 38–73)
NONHDLC SERPL-MCNC: 132 MG/DL
NRBC BLD-RTO: 0 /100 WBC
PLATELET # BLD AUTO: 221 K/UL (ref 150–450)
PMV BLD AUTO: 9.9 FL (ref 9.2–12.9)
POTASSIUM SERPL-SCNC: 4.4 MMOL/L (ref 3.5–5.1)
PROT SERPL-MCNC: 7.7 G/DL (ref 6–8.4)
RBC # BLD AUTO: 4.5 M/UL (ref 4–5.4)
SARS-COV-2 IGG SERPL IA-ACNC: <50 AU/ML
SARS-COV-2 IGG SERPL QL IA: NEGATIVE
SODIUM SERPL-SCNC: 142 MMOL/L (ref 136–145)
TRIGL SERPL-MCNC: 104 MG/DL (ref 30–150)
TSH SERPL DL<=0.005 MIU/L-ACNC: 1.26 UIU/ML (ref 0.4–4)
WBC # BLD AUTO: 5.74 K/UL (ref 3.9–12.7)

## 2021-10-19 PROCEDURE — 99396 PREV VISIT EST AGE 40-64: CPT | Mod: S$PBB,,, | Performed by: INTERNAL MEDICINE

## 2021-10-19 PROCEDURE — 99396 PR PREVENTIVE VISIT,EST,40-64: ICD-10-PCS | Mod: S$PBB,,, | Performed by: INTERNAL MEDICINE

## 2021-10-19 PROCEDURE — 86769 SARS-COV-2 COVID-19 ANTIBODY: CPT | Performed by: INTERNAL MEDICINE

## 2021-10-19 PROCEDURE — 82306 VITAMIN D 25 HYDROXY: CPT | Performed by: INTERNAL MEDICINE

## 2021-10-19 PROCEDURE — 99999 PR PBB SHADOW E&M-EST. PATIENT-LVL IV: ICD-10-PCS | Mod: PBBFAC,,, | Performed by: INTERNAL MEDICINE

## 2021-10-19 PROCEDURE — 90471 IMMUNIZATION ADMIN: CPT | Mod: PBBFAC,PO

## 2021-10-19 PROCEDURE — 36415 COLL VENOUS BLD VENIPUNCTURE: CPT | Mod: PO | Performed by: INTERNAL MEDICINE

## 2021-10-19 PROCEDURE — 99214 OFFICE O/P EST MOD 30 MIN: CPT | Mod: PBBFAC,PO | Performed by: INTERNAL MEDICINE

## 2021-10-19 PROCEDURE — 84443 ASSAY THYROID STIM HORMONE: CPT | Performed by: INTERNAL MEDICINE

## 2021-10-19 PROCEDURE — 85025 COMPLETE CBC W/AUTO DIFF WBC: CPT | Performed by: INTERNAL MEDICINE

## 2021-10-19 PROCEDURE — 99999 PR PBB SHADOW E&M-EST. PATIENT-LVL IV: CPT | Mod: PBBFAC,,, | Performed by: INTERNAL MEDICINE

## 2021-10-19 PROCEDURE — 80061 LIPID PANEL: CPT | Performed by: INTERNAL MEDICINE

## 2021-10-19 PROCEDURE — 80053 COMPREHEN METABOLIC PANEL: CPT | Performed by: INTERNAL MEDICINE

## 2021-10-19 PROCEDURE — 83036 HEMOGLOBIN GLYCOSYLATED A1C: CPT | Performed by: INTERNAL MEDICINE

## 2021-10-19 RX ORDER — RIZATRIPTAN BENZOATE 10 MG/1
10 TABLET ORAL ONCE AS NEEDED
Qty: 12 TABLET | Refills: 4 | Status: SHIPPED | OUTPATIENT
Start: 2021-10-19 | End: 2022-12-14

## 2021-10-20 ENCOUNTER — PATIENT MESSAGE (OUTPATIENT)
Dept: FAMILY MEDICINE | Facility: CLINIC | Age: 61
End: 2021-10-20

## 2021-10-22 ENCOUNTER — TELEPHONE (OUTPATIENT)
Dept: FAMILY MEDICINE | Facility: CLINIC | Age: 61
End: 2021-10-22
Payer: MEDICAID

## 2021-10-22 DIAGNOSIS — M54.50 LOW BACK PAIN WITHOUT SCIATICA, UNSPECIFIED BACK PAIN LATERALITY, UNSPECIFIED CHRONICITY: Primary | ICD-10-CM

## 2021-10-26 ENCOUNTER — TELEPHONE (OUTPATIENT)
Dept: OBSTETRICS AND GYNECOLOGY | Facility: CLINIC | Age: 61
End: 2021-10-26
Payer: MEDICAID

## 2021-10-27 ENCOUNTER — PATIENT OUTREACH (OUTPATIENT)
Dept: ADMINISTRATIVE | Facility: OTHER | Age: 61
End: 2021-10-27
Payer: MEDICAID

## 2021-10-29 ENCOUNTER — OFFICE VISIT (OUTPATIENT)
Dept: OBSTETRICS AND GYNECOLOGY | Facility: CLINIC | Age: 61
End: 2021-10-29
Payer: MEDICAID

## 2021-10-29 VITALS
WEIGHT: 183.63 LBS | DIASTOLIC BLOOD PRESSURE: 72 MMHG | SYSTOLIC BLOOD PRESSURE: 120 MMHG | BODY MASS INDEX: 29.51 KG/M2 | HEIGHT: 66 IN

## 2021-10-29 DIAGNOSIS — Z85.3 HISTORY OF BREAST CANCER: ICD-10-CM

## 2021-10-29 DIAGNOSIS — Z01.419 ENCOUNTER FOR GYNECOLOGICAL EXAMINATION WITHOUT ABNORMAL FINDING: Primary | ICD-10-CM

## 2021-10-29 DIAGNOSIS — Z00.00 ENCOUNTER FOR PREVENTIVE HEALTH EXAMINATION: ICD-10-CM

## 2021-10-29 PROCEDURE — 99386 PR PREVENTIVE VISIT,NEW,40-64: ICD-10-PCS | Mod: S$PBB,,, | Performed by: NURSE PRACTITIONER

## 2021-10-29 PROCEDURE — 87624 HPV HI-RISK TYP POOLED RSLT: CPT | Performed by: NURSE PRACTITIONER

## 2021-10-29 PROCEDURE — 99213 OFFICE O/P EST LOW 20 MIN: CPT | Mod: PBBFAC | Performed by: NURSE PRACTITIONER

## 2021-10-29 PROCEDURE — 99386 PREV VISIT NEW AGE 40-64: CPT | Mod: S$PBB,,, | Performed by: NURSE PRACTITIONER

## 2021-10-29 PROCEDURE — 99999 PR PBB SHADOW E&M-EST. PATIENT-LVL III: ICD-10-PCS | Mod: PBBFAC,,, | Performed by: NURSE PRACTITIONER

## 2021-10-29 PROCEDURE — 88175 CYTOPATH C/V AUTO FLUID REDO: CPT | Performed by: PATHOLOGY

## 2021-10-29 PROCEDURE — 99999 PR PBB SHADOW E&M-EST. PATIENT-LVL III: CPT | Mod: PBBFAC,,, | Performed by: NURSE PRACTITIONER

## 2021-10-29 PROCEDURE — 88141 PR  CYTOPATH CERV/VAG INTERPRET: ICD-10-PCS | Mod: ,,, | Performed by: PATHOLOGY

## 2021-10-29 PROCEDURE — 88141 CYTOPATH C/V INTERPRET: CPT | Mod: ,,, | Performed by: PATHOLOGY

## 2021-11-05 LAB
FINAL PATHOLOGIC DIAGNOSIS: ABNORMAL
Lab: ABNORMAL

## 2021-11-08 ENCOUNTER — TELEPHONE (OUTPATIENT)
Dept: OBSTETRICS AND GYNECOLOGY | Facility: CLINIC | Age: 61
End: 2021-11-08
Payer: MEDICAID

## 2021-11-09 LAB
HPV HR 12 DNA SPEC QL NAA+PROBE: NEGATIVE
HPV16 AG SPEC QL: NEGATIVE
HPV18 DNA SPEC QL NAA+PROBE: NEGATIVE

## 2022-01-13 ENCOUNTER — PATIENT MESSAGE (OUTPATIENT)
Dept: FAMILY MEDICINE | Facility: CLINIC | Age: 62
End: 2022-01-13
Payer: MEDICAID

## 2022-01-14 NOTE — TELEPHONE ENCOUNTER
are you printing out a order or do I just make the one I had and have you sign it when you return?

## 2022-01-18 ENCOUNTER — TELEPHONE (OUTPATIENT)
Dept: FAMILY MEDICINE | Facility: CLINIC | Age: 62
End: 2022-01-18
Payer: MEDICAID

## 2022-01-24 ENCOUNTER — TELEPHONE (OUTPATIENT)
Dept: OBSTETRICS AND GYNECOLOGY | Facility: CLINIC | Age: 62
End: 2022-01-24
Payer: MEDICAID

## 2022-01-24 NOTE — TELEPHONE ENCOUNTER
----- Message from Pat Beauchamp sent at 1/24/2022 10:32 AM CST -----  Contact: self [phone]  Patient has an appointment on 1/27 but is calling to cancel due to being exposed to covid and wants to be reschedule for next Thursday instead, Please call her back    Thank you

## 2022-02-02 ENCOUNTER — OFFICE VISIT (OUTPATIENT)
Dept: OBSTETRICS AND GYNECOLOGY | Facility: CLINIC | Age: 62
End: 2022-02-02
Payer: MEDICAID

## 2022-02-02 VITALS
HEIGHT: 66 IN | BODY MASS INDEX: 30.79 KG/M2 | WEIGHT: 191.56 LBS | SYSTOLIC BLOOD PRESSURE: 106 MMHG | DIASTOLIC BLOOD PRESSURE: 66 MMHG

## 2022-02-02 DIAGNOSIS — N94.10 DYSPAREUNIA, FEMALE: Primary | ICD-10-CM

## 2022-02-02 DIAGNOSIS — R87.615 UNSATISFACTORY CERVICAL PAPANICOLAOU SMEAR: ICD-10-CM

## 2022-02-02 PROCEDURE — 99214 OFFICE O/P EST MOD 30 MIN: CPT | Mod: S$PBB,,, | Performed by: NURSE PRACTITIONER

## 2022-02-02 PROCEDURE — 99999 PR PBB SHADOW E&M-EST. PATIENT-LVL III: CPT | Mod: PBBFAC,,, | Performed by: NURSE PRACTITIONER

## 2022-02-02 PROCEDURE — 1159F PR MEDICATION LIST DOCUMENTED IN MEDICAL RECORD: ICD-10-PCS | Mod: CPTII,,, | Performed by: NURSE PRACTITIONER

## 2022-02-02 PROCEDURE — 3078F PR MOST RECENT DIASTOLIC BLOOD PRESSURE < 80 MM HG: ICD-10-PCS | Mod: CPTII,,, | Performed by: NURSE PRACTITIONER

## 2022-02-02 PROCEDURE — 1159F MED LIST DOCD IN RCRD: CPT | Mod: CPTII,,, | Performed by: NURSE PRACTITIONER

## 2022-02-02 PROCEDURE — 3008F PR BODY MASS INDEX (BMI) DOCUMENTED: ICD-10-PCS | Mod: CPTII,,, | Performed by: NURSE PRACTITIONER

## 2022-02-02 PROCEDURE — 3074F SYST BP LT 130 MM HG: CPT | Mod: CPTII,,, | Performed by: NURSE PRACTITIONER

## 2022-02-02 PROCEDURE — 99214 PR OFFICE/OUTPT VISIT, EST, LEVL IV, 30-39 MIN: ICD-10-PCS | Mod: S$PBB,,, | Performed by: NURSE PRACTITIONER

## 2022-02-02 PROCEDURE — 3078F DIAST BP <80 MM HG: CPT | Mod: CPTII,,, | Performed by: NURSE PRACTITIONER

## 2022-02-02 PROCEDURE — 3008F BODY MASS INDEX DOCD: CPT | Mod: CPTII,,, | Performed by: NURSE PRACTITIONER

## 2022-02-02 PROCEDURE — 88175 CYTOPATH C/V AUTO FLUID REDO: CPT | Performed by: NURSE PRACTITIONER

## 2022-02-02 PROCEDURE — 1160F RVW MEDS BY RX/DR IN RCRD: CPT | Mod: CPTII,,, | Performed by: NURSE PRACTITIONER

## 2022-02-02 PROCEDURE — 1160F PR REVIEW ALL MEDS BY PRESCRIBER/CLIN PHARMACIST DOCUMENTED: ICD-10-PCS | Mod: CPTII,,, | Performed by: NURSE PRACTITIONER

## 2022-02-02 PROCEDURE — 3074F PR MOST RECENT SYSTOLIC BLOOD PRESSURE < 130 MM HG: ICD-10-PCS | Mod: CPTII,,, | Performed by: NURSE PRACTITIONER

## 2022-02-02 PROCEDURE — 99213 OFFICE O/P EST LOW 20 MIN: CPT | Mod: PBBFAC | Performed by: NURSE PRACTITIONER

## 2022-02-02 PROCEDURE — 99999 PR PBB SHADOW E&M-EST. PATIENT-LVL III: ICD-10-PCS | Mod: PBBFAC,,, | Performed by: NURSE PRACTITIONER

## 2022-02-02 NOTE — PATIENT INSTRUCTIONS
"Patient Education       Dyspareunia (Painful Sex)   The Basics   Written by the doctors and editors at [facility]   What is dyspareunia? -- Dyspareunia is pain that happens just before, during, or after sex. It can happen in men and women, but is more common in women.  Women can have pain at the vulva, the area around the opening of the vagina. Or the pain can be inside the vagina or in the lower belly (figure 1). Men can have pain in the penis, testicles, belly, and sometimes the rectum.  What causes dyspareunia? -- There are many possible causes.  In women, common causes include:  · Childbirth - Sex can be painful for several weeks or months after giving birth.  · Endometriosis - In this condition, tissue that normally grows inside a woman's uterus grows outside it. This can cause pain in the belly during sex.  · Vaginal dryness - This can be caused by:  ? Menopause - This is the time in a woman's life when she stops having periods. The vagina and tissues around it can get dry and thin at menopause. This can make sex hurt.  ? Not being aroused or "excited" before sex  · Conditions that cause long-lasting pain in the vulva, bladder, or pelvis - These can include:  ? A condition called "vulvodynia" - This is pain in the vulva.  ? A condition called "interstitial cystitis/bladder pain syndrome" - This condition causes bladder pain and other symptoms.  ? A condition called "chronic pelvic pain" - This is pain in the area below the belly button that lasts 6 months or longer.  · An infection in the vagina or bladder  · Skin problems around the vagina  · Bad feelings about a partner or relationship - Feeling bad about your partner or about yourself can make sex painful.  · A painful experience in the past - This could be a past experience of sex or a medical exam that hurt. It could even be pain from using a tampon.  · Birth control pills - Some women who take birth control pills start having pain during sex.  In men, " common causes include:  · Infections - These can include:  ? An infection in the prostate - The prostate is a gland that makes some of the fluid men release during sex. Infections in other parts of the body can also make sex hurt.  ? Infection with a disease spread through sex, such as gonorrhea.  · Skin problems  · Bad feelings about a partner or relationship - Feeling bad about a partner or about yourself can make sex painful.  Should I see a doctor or nurse? -- Yes. If sex is painful, see your doctor or nurse. Some people feel embarrassed bringing this up, but this is something your doctor or nurse can help you with.  Will I need tests? -- Your doctor or nurse will decide which tests you should have based on your age, other symptoms, and individual situation. He or she will do an exam and ask you about your symptoms.  Here are some common tests doctors use to find the cause of dyspareunia:  · Urine tests - These can look for a bladder infection.  · If you are a woman, tests on a sample of fluid from your vagina - These can look for an infection in the vagina or cervix.  How is dyspareunia treated? -- Treatments for women include:  · Antibiotics or antifungal medicines - These can help if the pain is caused by an infection in the vagina or bladder.  · Creams or gels to keep the vagina moist - These include:  ? Vaginal lubricants, which are used during sex  ? Vaginal moisturizers, which are used several times a week  ? A prescription cream to treat vaginal dryness or a skin condition  · Gels or ointments to numb the vagina before and after sex.  · Physical therapy to loosen the muscles around the vagina.  · Counseling - This can help if pain is caused by bad feelings about sex, a relationship, or yourself.  · Surgery - A few women have pain that is caused by a growth inside the body. Doctors might do surgery to take out the growth.  Treatments for men include:  · Medicines to treat infection or other conditions  that cause pain - These can include antibiotics and other medicines.  · Treatment for skin problems  · Counseling - This can help if pain could be caused by feeling bad about sex, a relationship, or yourself.  All topics are updated as new evidence becomes available and our peer review process is complete.  This topic retrieved from [employer]to on: Sep 21, 2021.  Topic 60204 Version 8.0  Release: 29.4.2 - C29.263  © 2021 UpToDate, Inc. and/or its affiliates. All rights reserved.  figure 1: Female reproductive anatomy     These are the internal organs that make up the female reproductive system.  Graphic 13658 Version 6.0    Consumer Information Use and Disclaimer   This information is not specific medical advice and does not replace information you receive from your health care provider. This is only a brief summary of general information. It does NOT include all information about conditions, illnesses, injuries, tests, procedures, treatments, therapies, discharge instructions or life-style choices that may apply to you. You must talk with your health care provider for complete information about your health and treatment options. This information should not be used to decide whether or not to accept your health care provider's advice, instructions or recommendations. Only your health care provider has the knowledge and training to provide advice that is right for you. The use of this information is governed by the [employer] End User License Agreement, available at https://www.Managed Objects.Shipping Easy/en/solutions/Kuaishubao.com/about/tangela.The use of [employer]antto content is governed by the [employer]to Terms of Use. ©2021 UpToDate, Inc. All rights reserved.  Copyright   © 2021 UpToDate, Inc. and/or its affiliates. All rights reserved.

## 2022-02-02 NOTE — PROGRESS NOTES
"  Samuel Kilgore is a 61 y.o. female  presents for pain with intercourse and last pap in 2021 was unsatisfactory but HPV negative.   Having pain with intercourse, after insertion. She has tried oil based lubrication without luck. She has had breast cancer so is not wanting estrogen cream vaginally but will talk with her oncologist.     LMP: No LMP recorded. Patient is postmenopausal..      Past Medical History:   Diagnosis Date    Anxiety     Breast cancer 2010    Cancer     Breast    Encounter for blood transfusion     Lumbar spondylosis     Migraine headache     Mild vitamin D deficiency      Past Surgical History:   Procedure Laterality Date    BREAST RECONSTRUCTION Bilateral 2010     SECTION      MASTECTOMY Bilateral      Social History     Socioeconomic History    Marital status:    Tobacco Use    Smoking status: Never Smoker    Smokeless tobacco: Never Used   Substance and Sexual Activity    Alcohol use: Yes     Comment: occassionally    Drug use: No    Sexual activity: Not Currently     Partners: Male   Other Topics Concern    Are you pregnant or think you may be? No    Breast-feeding No     Family History   Problem Relation Age of Onset    Heart disease Maternal Grandfather     Diabetes Maternal Grandfather     Colon cancer Paternal Grandmother     Melanoma Paternal Grandmother     Allergies Child     Asthma Child     Cancer Neg Hx     Miscarriages / Stillbirths Neg Hx     Psoriasis Neg Hx     Lupus Neg Hx     Eczema Neg Hx     Acne Neg Hx      OB History        3    Para   3    Term   3            AB        Living   3       SAB        IAB        Ectopic        Multiple        Live Births                     /66   Ht 5' 6" (1.676 m)   Wt 86.9 kg (191 lb 9.3 oz)   BMI 30.92 kg/m²       ROS:  GENERAL: Denies weight gain or weight loss. Feeling well overall.   SKIN: Denies rash or lesions.   HEAD: Denies head injury or headache. "   NODES: Denies enlarged lymph nodes.   CHEST: Denies chest pain or shortness of breath.   CARDIOVASCULAR: Denies palpitations or left sided chest pain.   ABDOMEN: No abdominal pain, constipation, diarrhea, nausea, vomiting or rectal bleeding.   URINARY: No frequency, dysuria, hematuria, or burning on urination.  REPRODUCTIVE: See HPI.   BREASTS: The patient performs breast self-examination and denies pain, lumps, or nipple discharge.   HEMATOLOGIC: No easy bruisability or excessive bleeding.   MUSCULOSKELETAL: Denies joint pain or swelling.   NEUROLOGIC: Denies syncope or weakness.   PSYCHIATRIC: Denies depression, anxiety or mood swings.    PHYSICAL EXAM:  APPEARANCE: Well nourished, well developed, in no acute distress.  AFFECT: WNL, alert and oriented x 3  SKIN: No acne or hirsutism  PELVIC: Normal external genitalia without lesions.  Normal hair distribution.  Adequate perineal body, normal urethral meatus.  Vagina atrophy without lesions or discharge.  Cervix pink, without lesions, discharge or tenderness.  No significant cystocele or rectocele.  Bimanual exam shows uterus to be normal size, regular, mobile and nontender.  Adnexa without masses or tenderness.    EXTREMITIES: No edema.  Physical Exam    1. Dyspareunia, female     2. Unsatisfactory cervical Papanicolaou smear  Liquid-Based Pap Smear, Screening    AND PLAN:    Patient was counseled today on A.C.S. Pap guidelines and recommendations for yearly pelvic exams, mammograms and monthly self breast exams; to see her PCP for other health maintenance.     Suggestion for painful intercourse:  replense  rephresh  Vaginal creams  intrarosa

## 2022-02-07 ENCOUNTER — PATIENT MESSAGE (OUTPATIENT)
Dept: OBSTETRICS AND GYNECOLOGY | Facility: CLINIC | Age: 62
End: 2022-02-07
Payer: MEDICAID

## 2022-03-05 ENCOUNTER — OFFICE VISIT (OUTPATIENT)
Dept: URGENT CARE | Facility: CLINIC | Age: 62
End: 2022-03-05
Payer: MEDICAID

## 2022-03-05 VITALS
BODY MASS INDEX: 30.7 KG/M2 | WEIGHT: 191 LBS | DIASTOLIC BLOOD PRESSURE: 63 MMHG | HEART RATE: 70 BPM | SYSTOLIC BLOOD PRESSURE: 112 MMHG | RESPIRATION RATE: 16 BRPM | HEIGHT: 66 IN | OXYGEN SATURATION: 97 % | TEMPERATURE: 99 F

## 2022-03-05 DIAGNOSIS — J30.1 SEASONAL ALLERGIC RHINITIS DUE TO POLLEN: Primary | ICD-10-CM

## 2022-03-05 DIAGNOSIS — R09.81 SINUS CONGESTION: ICD-10-CM

## 2022-03-05 DIAGNOSIS — J02.9 SORE THROAT: ICD-10-CM

## 2022-03-05 DIAGNOSIS — R05.8 COUGH PRODUCTIVE OF PURULENT SPUTUM: ICD-10-CM

## 2022-03-05 LAB
CTP QC/QA: YES
MOLECULAR STREP A: NEGATIVE

## 2022-03-05 PROCEDURE — 1160F RVW MEDS BY RX/DR IN RCRD: CPT | Mod: CPTII,S$GLB,, | Performed by: PHYSICIAN ASSISTANT

## 2022-03-05 PROCEDURE — 3008F BODY MASS INDEX DOCD: CPT | Mod: CPTII,S$GLB,, | Performed by: PHYSICIAN ASSISTANT

## 2022-03-05 PROCEDURE — 99214 PR OFFICE/OUTPT VISIT, EST, LEVL IV, 30-39 MIN: ICD-10-PCS | Mod: S$GLB,,, | Performed by: PHYSICIAN ASSISTANT

## 2022-03-05 PROCEDURE — 3008F PR BODY MASS INDEX (BMI) DOCUMENTED: ICD-10-PCS | Mod: CPTII,S$GLB,, | Performed by: PHYSICIAN ASSISTANT

## 2022-03-05 PROCEDURE — 3074F PR MOST RECENT SYSTOLIC BLOOD PRESSURE < 130 MM HG: ICD-10-PCS | Mod: CPTII,S$GLB,, | Performed by: PHYSICIAN ASSISTANT

## 2022-03-05 PROCEDURE — 87651 POCT STREP A MOLECULAR: ICD-10-PCS | Mod: QW,S$GLB,, | Performed by: PHYSICIAN ASSISTANT

## 2022-03-05 PROCEDURE — 1159F MED LIST DOCD IN RCRD: CPT | Mod: CPTII,S$GLB,, | Performed by: PHYSICIAN ASSISTANT

## 2022-03-05 PROCEDURE — 99214 OFFICE O/P EST MOD 30 MIN: CPT | Mod: S$GLB,,, | Performed by: PHYSICIAN ASSISTANT

## 2022-03-05 PROCEDURE — 1159F PR MEDICATION LIST DOCUMENTED IN MEDICAL RECORD: ICD-10-PCS | Mod: CPTII,S$GLB,, | Performed by: PHYSICIAN ASSISTANT

## 2022-03-05 PROCEDURE — 87651 STREP A DNA AMP PROBE: CPT | Mod: QW,S$GLB,, | Performed by: PHYSICIAN ASSISTANT

## 2022-03-05 PROCEDURE — 1160F PR REVIEW ALL MEDS BY PRESCRIBER/CLIN PHARMACIST DOCUMENTED: ICD-10-PCS | Mod: CPTII,S$GLB,, | Performed by: PHYSICIAN ASSISTANT

## 2022-03-05 PROCEDURE — 3078F DIAST BP <80 MM HG: CPT | Mod: CPTII,S$GLB,, | Performed by: PHYSICIAN ASSISTANT

## 2022-03-05 PROCEDURE — 3078F PR MOST RECENT DIASTOLIC BLOOD PRESSURE < 80 MM HG: ICD-10-PCS | Mod: CPTII,S$GLB,, | Performed by: PHYSICIAN ASSISTANT

## 2022-03-05 PROCEDURE — 3074F SYST BP LT 130 MM HG: CPT | Mod: CPTII,S$GLB,, | Performed by: PHYSICIAN ASSISTANT

## 2022-03-05 RX ORDER — BENZONATATE 100 MG/1
100 CAPSULE ORAL 3 TIMES DAILY PRN
Qty: 21 CAPSULE | Refills: 0 | Status: SHIPPED | OUTPATIENT
Start: 2022-03-05 | End: 2022-03-12

## 2022-03-05 RX ORDER — AZITHROMYCIN 250 MG/1
TABLET, FILM COATED ORAL
Qty: 6 TABLET | Refills: 0 | Status: SHIPPED | OUTPATIENT
Start: 2022-03-05 | End: 2022-04-07

## 2022-03-05 RX ORDER — MONTELUKAST SODIUM 10 MG/1
10 TABLET ORAL DAILY
Qty: 7 TABLET | Refills: 0 | Status: SHIPPED | OUTPATIENT
Start: 2022-03-05 | End: 2022-03-12

## 2022-03-05 NOTE — PATIENT INSTRUCTIONS
Hold antibiotic (zpak) and continue conservative treatment to see if you are better.  If you are worsening or not better in 5 days you can then take antibiotics.      Singulair once daily x7 days.    Cough--Make sure you are getting rest and drinking lots of fluids.    You have been prescribed Tessalon perles   You can use cough drops or Cepacol to soothe your sore throat.     You can also take Elderberry and/or Emergen-C (vitamin C) to help boost your immune system.    Sore throat -You can purchase cough drops over the counter to soothe your sore throat.  You may gargle with hot salt water to alleviate some of your throat discomfort.  Drink plenty of fluids, recommend warm tea with honey.   Avoid spicy food, citrus fruits, and red sauces- as this may irritate the throat more.    You can also take a daily anti-histamine such as Zyrtec, Claritin, Xyzal, Allegra, etc to help with runny nose/sneezing/sore throat/cough.    If your symptoms do not improve, you should return to this clinic. If your symptoms worsen, go to the emergency room.     Strep negative  -Take Tylenol every 4 hours and/or Motrin every 6-8 hours for fever and pain control  -You can also use cough drops to soothe your sore throat  -Drink plenty fluids  -You will need to purchase a new toothbrush     -If your symptoms worsen, you will need to follow-up with primary care or go to the Emergency Department      OVER THE COUNTER RECOMMENDATIONS/SUGGESTIONS.--if needed  Remember to switch antihistamines every 3 months, if taken daily.     Make sure to stay well hydrated.    Use Nasal Saline to mechanically move any post nasal drip from your eustachian tube or from the back of your throat.    Use warm salt water gargles to ease your throat pain. Warm salt water gargles as needed for sore throat-  1/2 tsp salt to 1 cup warm water, gargle as desired.    Use an antihistamine such as Claritin, Zyrtec or Allegra to dry you out (NONDROWSY) or Benadryl  (DROWSY).    Use pseudoephedrine (behind the counter) to decongest. Pseudoephedrine  30 mg up to 240 mg /day. *BE AWARE- It can raise your blood pressure and give you palpitations.    Use mucinex (guaifenisin) to break up mucous up to 2400mg/day to loosen any mucous.   The mucinex DM pill has a cough suppressant that can be sedating. It can be used at night to stop the tickle at the back of your throat.  You can use Mucinex D (it has guaifenesin and a high dose of pseudoephedrine) in the mornings to help decongest.      Use Flonase 1-2 sprays/nostril per day. It is a local acting steroid nasal spray, if you develop a bloody nose, stop using the medication immediately.    Sometimes Nyquil at night is beneficial to help you get some rest, however it is sedating and it does have an antihistamine, and tylenol.    Honey is a natural cough suppressant that can be used.    Tylenol up to 4,000 mg a day is safe for short periods and can be used for headache, body aches, pain, and fever. However in high doses and prolonged use it can cause liver irritation.    Ibuprofen is a non-steroidal anti-inflammatory that can be used for headache, body aches, pain, and fever.However it can also cause stomach irritation if over used.      - You must understand that you have received an Urgent Care treatment only and that you may be released before all of your medical problems are known or treated.   - You, the patient, will arrange for follow up care as instructed with your primary care provider or recommended specialist.   - If your condition worsens or fails to improve we recommend that you receive another evaluation at the ER immediately or contact your PCP to discuss your concerns, or return here.   - Please do not drive or make any important decisions for 24 hours if you have received any pain medications, sedatives or mood altering drugs during your visit.    Disclaimer: This document was drafted with the use of a voice recognition  device and is likely to have sound alike errors.

## 2022-03-05 NOTE — PROGRESS NOTES
"Subjective:       Patient ID: Samuel Kilgore is a 61 y.o. female.    Vitals:  height is 5' 6" (1.676 m) and weight is 86.6 kg (191 lb). Her temperature is 98.6 °F (37 °C). Her blood pressure is 112/63 and her pulse is 70. Her respiration is 16 and oxygen saturation is 97%.     Chief Complaint: Sore Throat    61-year-old female presents urgent care complaining of PND, sinus congestion , cough , sore throat X 2 days.  She has a history of seasonal allergies.  She is requesting a strep test today.      Cough  This is a new problem. The current episode started in the past 7 days. The problem has been unchanged. The cough is productive of sputum. Associated symptoms include ear pain, headaches, nasal congestion, postnasal drip, rhinorrhea and a sore throat. Pertinent negatives include no chest pain, chills, ear congestion, fever, heartburn, hemoptysis, myalgias, rash, shortness of breath, sweats, weight loss or wheezing. She has tried nothing for the symptoms. The treatment provided no relief. There is no history of asthma, bronchiectasis, bronchitis, COPD, emphysema, environmental allergies or pneumonia.       Constitution: Negative for chills, fatigue, fever, generalized weakness and international travel in last 60 days.   HENT: Positive for ear pain, congestion, postnasal drip, sinus pressure and sore throat. Negative for tinnitus, drooling, tongue pain, facial swelling, trouble swallowing and voice change.    Neck: Negative for neck pain, neck stiffness and neck swelling.   Cardiovascular: Negative for chest pain and palpitations.   Eyes: Negative for eye pain, photophobia and vision loss.   Respiratory: Positive for cough and sputum production. Negative for chest tightness, bloody sputum, shortness of breath and wheezing.    Gastrointestinal: Negative for abdominal pain, nausea, vomiting, constipation, diarrhea and heartburn.   Genitourinary: Negative for dysuria and hematuria.   Musculoskeletal: Negative for pain, " "back pain and muscle ache.   Skin: Negative for rash, erythema and bruising.   Allergic/Immunologic: Negative for environmental allergies.   Neurological: Positive for headaches. Negative for dizziness, light-headedness, speech difficulty, altered mental status, loss of consciousness, numbness and tingling.   Psychiatric/Behavioral: Negative for altered mental status.       Objective:       Vitals:    03/05/22 1251   BP: 112/63   Pulse: 70   Resp: 16   Temp: 98.6 °F (37 °C)   SpO2: 97%   Weight: 86.6 kg (191 lb)   Height: 5' 6" (1.676 m)       Physical Exam   Constitutional: She is oriented to person, place, and time. She appears well-developed. She is cooperative.  Non-toxic appearance. She appears ill. No distress. awake  HENT:   Head: Normocephalic and atraumatic.   Ears:   Right Ear: Hearing, external ear and ear canal normal. No drainage, swelling or tenderness. Tympanic membrane is erythematous. Tympanic membrane is not bulging. A middle ear effusion is present. No decreased hearing is noted. impacted cerumen  Left Ear: Hearing, external ear and ear canal normal. No drainage, swelling or tenderness. Tympanic membrane is erythematous. Tympanic membrane is not bulging. A middle ear effusion is present. No decreased hearing is noted. impacted cerumen  Nose: Congestion present. No mucosal edema, rhinorrhea, purulent discharge or nasal deformity. No epistaxis. Right sinus exhibits no maxillary sinus tenderness and no frontal sinus tenderness. Left sinus exhibits no maxillary sinus tenderness and no frontal sinus tenderness.   Mouth/Throat: Uvula is midline and mucous membranes are normal. Mucous membranes are moist. No oral lesions. No trismus in the jaw. Normal dentition. No uvula swelling. Posterior oropharyngeal erythema present. No oropharyngeal exudate, posterior oropharyngeal edema, tonsillar abscesses or cobblestoning. Tonsils are 0 on the right. Tonsils are 0 on the left. No tonsillar exudate. Oropharynx is " clear.   Eyes: Conjunctivae and lids are normal. Pupils are equal, round, and reactive to light. No visual field deficit is present. Right eye exhibits no discharge. Left eye exhibits no discharge. No scleral icterus. Right eye exhibits no nystagmus. Left eye exhibits no nystagmus.      extraocular movement intact vision grossly intact gaze aligned appropriately periorbital hyperpigmentation   Neck: Trachea normal and phonation normal. Neck supple. Carotid bruit is not present. No Brudzinski's sign and no Kernig's sign noted.   Cardiovascular: Normal rate, regular rhythm, S1 normal, S2 normal, normal heart sounds and normal pulses. Exam reveals no decreased pulses.   Pulmonary/Chest: Effort normal and breath sounds normal. No accessory muscle usage. No tachypnea. No respiratory distress. She has no decreased breath sounds. She has no wheezes. She has no rhonchi. She exhibits no tenderness, no crepitus and no swelling.   Abdominal: Normal appearance and bowel sounds are normal. She exhibits no distension, no pulsatile midline mass and no mass. Soft. There is no abdominal tenderness. There is no rebound, no guarding, no left CVA tenderness and no right CVA tenderness.   Musculoskeletal: Normal range of motion.         General: No swelling, tenderness, deformity or signs of injury. Normal range of motion.      Cervical back: She exhibits no tenderness.      Right lower leg: No edema.      Left lower leg: No edema.   Lymphadenopathy:     She has no cervical adenopathy.   Neurological: no focal deficit. She is alert, oriented to person, place, and time and at baseline. She has normal motor skills, normal sensation and intact cranial nerves. She displays no weakness and no dysarthria. No cranial nerve deficit. She exhibits normal muscle tone. Gait normal. Coordination and gait normal. GCS eye subscore is 4. GCS verbal subscore is 5. GCS motor subscore is 6.   Skin: Skin is warm, dry, intact, not diaphoretic, not pale and  no rash. Capillary refill takes less than 2 seconds. No erythema and No lesion   Psychiatric: Her speech is normal and behavior is normal. Judgment and thought content normal.   Nursing note and vitals reviewed.        Assessment:       Results for orders placed or performed in visit on 03/05/22   POCT Strep A, Molecular   Result Value Ref Range    Molecular Strep A, POC Negative Negative     Acceptable Yes          1. Seasonal allergic rhinitis due to pollen    2. Sore throat    3. Cough productive of purulent sputum    4. Sinus congestion          Plan:         Seasonal allergic rhinitis due to pollen  -     montelukast (SINGULAIR) 10 mg tablet; Take 1 tablet (10 mg total) by mouth once daily. for 7 days  Dispense: 7 tablet; Refill: 0    Sore throat  -     POCT Strep A, Molecular    Cough productive of purulent sputum  -     azithromycin (Z-MARYCARMEN) 250 MG tablet; Take 2 tablets by mouth on day 1; Take 1 tablet by mouth on days 2-5  Dispense: 6 tablet; Refill: 0  -     benzonatate (TESSALON) 100 MG capsule; Take 1 capsule (100 mg total) by mouth 3 (three) times daily as needed for Cough.  Dispense: 21 capsule; Refill: 0    Sinus congestion                   Patient Instructions   Hold antibiotic (zpak) and continue conservative treatment to see if you are better.  If you are worsening or not better in 5 days you can then take antibiotics.      Singulair once daily x7 days.    Cough--Make sure you are getting rest and drinking lots of fluids.    You have been prescribed Tessalon perles   You can use cough drops or Cepacol to soothe your sore throat.     You can also take Elderberry and/or Emergen-C (vitamin C) to help boost your immune system.    Sore throat -You can purchase cough drops over the counter to soothe your sore throat.  You may gargle with hot salt water to alleviate some of your throat discomfort.  Drink plenty of fluids, recommend warm tea with honey.   Avoid spicy food, citrus fruits, and  red sauces- as this may irritate the throat more.    You can also take a daily anti-histamine such as Zyrtec, Claritin, Xyzal, Allegra, etc to help with runny nose/sneezing/sore throat/cough.    If your symptoms do not improve, you should return to this clinic. If your symptoms worsen, go to the emergency room.     Strep negative  -Take Tylenol every 4 hours and/or Motrin every 6-8 hours for fever and pain control  -You can also use cough drops to soothe your sore throat  -Drink plenty fluids  -You will need to purchase a new toothbrush     -If your symptoms worsen, you will need to follow-up with primary care or go to the Emergency Department      OVER THE COUNTER RECOMMENDATIONS/SUGGESTIONS.--if needed  Remember to switch antihistamines every 3 months, if taken daily.     Make sure to stay well hydrated.    Use Nasal Saline to mechanically move any post nasal drip from your eustachian tube or from the back of your throat.    Use warm salt water gargles to ease your throat pain. Warm salt water gargles as needed for sore throat-  1/2 tsp salt to 1 cup warm water, gargle as desired.    Use an antihistamine such as Claritin, Zyrtec or Allegra to dry you out (NONDROWSY) or Benadryl (DROWSY).    Use pseudoephedrine (behind the counter) to decongest. Pseudoephedrine  30 mg up to 240 mg /day. *BE AWARE- It can raise your blood pressure and give you palpitations.    Use mucinex (guaifenisin) to break up mucous up to 2400mg/day to loosen any mucous.   The mucinex DM pill has a cough suppressant that can be sedating. It can be used at night to stop the tickle at the back of your throat.  You can use Mucinex D (it has guaifenesin and a high dose of pseudoephedrine) in the mornings to help decongest.      Use Flonase 1-2 sprays/nostril per day. It is a local acting steroid nasal spray, if you develop a bloody nose, stop using the medication immediately.    Sometimes Nyquil at night is beneficial to help you get some rest,  however it is sedating and it does have an antihistamine, and tylenol.    Honey is a natural cough suppressant that can be used.    Tylenol up to 4,000 mg a day is safe for short periods and can be used for headache, body aches, pain, and fever. However in high doses and prolonged use it can cause liver irritation.    Ibuprofen is a non-steroidal anti-inflammatory that can be used for headache, body aches, pain, and fever.However it can also cause stomach irritation if over used.      - You must understand that you have received an Urgent Care treatment only and that you may be released before all of your medical problems are known or treated.   - You, the patient, will arrange for follow up care as instructed with your primary care provider or recommended specialist.   - If your condition worsens or fails to improve we recommend that you receive another evaluation at the ER immediately or contact your PCP to discuss your concerns, or return here.   - Please do not drive or make any important decisions for 24 hours if you have received any pain medications, sedatives or mood altering drugs during your visit.    Disclaimer: This document was drafted with the use of a voice recognition device and is likely to have sound alike errors.

## 2022-03-08 ENCOUNTER — TELEPHONE (OUTPATIENT)
Dept: URGENT CARE | Facility: CLINIC | Age: 62
End: 2022-03-08
Payer: MEDICAID

## 2022-04-07 ENCOUNTER — OFFICE VISIT (OUTPATIENT)
Dept: OPHTHALMOLOGY | Facility: CLINIC | Age: 62
End: 2022-04-07
Payer: MEDICAID

## 2022-04-07 DIAGNOSIS — H25.13 NUCLEAR SCLEROSIS, BILATERAL: Primary | ICD-10-CM

## 2022-04-07 DIAGNOSIS — H52.4 MYOPIA WITH ASTIGMATISM AND PRESBYOPIA, BILATERAL: ICD-10-CM

## 2022-04-07 DIAGNOSIS — H52.13 MYOPIA WITH ASTIGMATISM AND PRESBYOPIA, BILATERAL: ICD-10-CM

## 2022-04-07 DIAGNOSIS — H52.203 MYOPIA WITH ASTIGMATISM AND PRESBYOPIA, BILATERAL: ICD-10-CM

## 2022-04-07 PROCEDURE — 92015 PR REFRACTION: ICD-10-PCS | Mod: ,,, | Performed by: OPTOMETRIST

## 2022-04-07 PROCEDURE — 1160F PR REVIEW ALL MEDS BY PRESCRIBER/CLIN PHARMACIST DOCUMENTED: ICD-10-PCS | Mod: CPTII,,, | Performed by: OPTOMETRIST

## 2022-04-07 PROCEDURE — 1159F MED LIST DOCD IN RCRD: CPT | Mod: CPTII,,, | Performed by: OPTOMETRIST

## 2022-04-07 PROCEDURE — 92015 DETERMINE REFRACTIVE STATE: CPT | Mod: ,,, | Performed by: OPTOMETRIST

## 2022-04-07 PROCEDURE — 1159F PR MEDICATION LIST DOCUMENTED IN MEDICAL RECORD: ICD-10-PCS | Mod: CPTII,,, | Performed by: OPTOMETRIST

## 2022-04-07 PROCEDURE — 1160F RVW MEDS BY RX/DR IN RCRD: CPT | Mod: CPTII,,, | Performed by: OPTOMETRIST

## 2022-04-07 PROCEDURE — 99999 PR PBB SHADOW E&M-EST. PATIENT-LVL I: ICD-10-PCS | Mod: PBBFAC,,, | Performed by: OPTOMETRIST

## 2022-04-07 PROCEDURE — 99211 OFF/OP EST MAY X REQ PHY/QHP: CPT | Mod: PBBFAC | Performed by: OPTOMETRIST

## 2022-04-07 PROCEDURE — 92014 PR EYE EXAM, EST PATIENT,COMPREHESV: ICD-10-PCS | Mod: S$PBB,,, | Performed by: OPTOMETRIST

## 2022-04-07 PROCEDURE — 92014 COMPRE OPH EXAM EST PT 1/>: CPT | Mod: S$PBB,,, | Performed by: OPTOMETRIST

## 2022-04-07 PROCEDURE — 99999 PR PBB SHADOW E&M-EST. PATIENT-LVL I: CPT | Mod: PBBFAC,,, | Performed by: OPTOMETRIST

## 2022-04-07 NOTE — PROGRESS NOTES
HPI     Long-term use of Plaquenil     Comments: 1 year HVF/ Plaquenil              Comments     Patient returns today for exam of eyes due to use of Plaquenil - states   she only took med for about 3 months in total, had some side effects and   dr took her off - va seems worse than last visit - needs new glasses mrx   -- lost to f/u since 2019 - migraine sufferer chronic (maxalt)          Last edited by Daily Courtney MA on 4/7/2022  3:11 PM. (History)            Assessment /Plan     For exam results, see Encounter Report.    Nuclear sclerosis, bilateral  Cataracts not significantly affecting activities of daily living and therefore surgery is not indicated at this time.   Will continue to monitor over the next 12 months. Pt to call or RTC with any significant change in vision prior to next visit.     Myopia with astigmatism and presbyopia, bilateral  Eyeglass Final Rx     Eyeglass Final Rx       Sphere Cylinder Axis Add    Right -1.00 +2.00 015 +2.25    Left -1.25 +2.25 165 +2.25    Expiration Date: 4/7/2023    Comments: SVL distance ok                RTC 1 yr for dilated eye exam or PRN if any problems.   Discussed above and answered questions.

## 2022-04-18 ENCOUNTER — OFFICE VISIT (OUTPATIENT)
Dept: URGENT CARE | Facility: CLINIC | Age: 62
End: 2022-04-18
Payer: MEDICAID

## 2022-04-18 VITALS
BODY MASS INDEX: 29.73 KG/M2 | WEIGHT: 185 LBS | SYSTOLIC BLOOD PRESSURE: 135 MMHG | OXYGEN SATURATION: 100 % | TEMPERATURE: 99 F | HEART RATE: 72 BPM | HEIGHT: 66 IN | DIASTOLIC BLOOD PRESSURE: 86 MMHG

## 2022-04-18 DIAGNOSIS — H65.01 NON-RECURRENT ACUTE SEROUS OTITIS MEDIA OF RIGHT EAR: ICD-10-CM

## 2022-04-18 DIAGNOSIS — G43.009 MIGRAINE WITHOUT AURA AND WITHOUT STATUS MIGRAINOSUS, NOT INTRACTABLE: ICD-10-CM

## 2022-04-18 DIAGNOSIS — J01.01 ACUTE RECURRENT MAXILLARY SINUSITIS: Primary | ICD-10-CM

## 2022-04-18 DIAGNOSIS — Z20.822 ENCOUNTER FOR LABORATORY TESTING FOR COVID-19 VIRUS: ICD-10-CM

## 2022-04-18 LAB
CTP QC/QA: YES
CTP QC/QA: YES
POC MOLECULAR INFLUENZA A AGN: NEGATIVE
POC MOLECULAR INFLUENZA B AGN: NEGATIVE
SARS-COV-2 RDRP RESP QL NAA+PROBE: NEGATIVE

## 2022-04-18 PROCEDURE — 3075F PR MOST RECENT SYSTOLIC BLOOD PRESS GE 130-139MM HG: ICD-10-PCS | Mod: CPTII,S$GLB,, | Performed by: STUDENT IN AN ORGANIZED HEALTH CARE EDUCATION/TRAINING PROGRAM

## 2022-04-18 PROCEDURE — 1160F RVW MEDS BY RX/DR IN RCRD: CPT | Mod: CPTII,S$GLB,, | Performed by: STUDENT IN AN ORGANIZED HEALTH CARE EDUCATION/TRAINING PROGRAM

## 2022-04-18 PROCEDURE — 1159F PR MEDICATION LIST DOCUMENTED IN MEDICAL RECORD: ICD-10-PCS | Mod: CPTII,S$GLB,, | Performed by: STUDENT IN AN ORGANIZED HEALTH CARE EDUCATION/TRAINING PROGRAM

## 2022-04-18 PROCEDURE — 87502 POCT INFLUENZA A/B MOLECULAR: ICD-10-PCS | Mod: QW,S$GLB,, | Performed by: STUDENT IN AN ORGANIZED HEALTH CARE EDUCATION/TRAINING PROGRAM

## 2022-04-18 PROCEDURE — 1159F MED LIST DOCD IN RCRD: CPT | Mod: CPTII,S$GLB,, | Performed by: STUDENT IN AN ORGANIZED HEALTH CARE EDUCATION/TRAINING PROGRAM

## 2022-04-18 PROCEDURE — 3079F PR MOST RECENT DIASTOLIC BLOOD PRESSURE 80-89 MM HG: ICD-10-PCS | Mod: CPTII,S$GLB,, | Performed by: STUDENT IN AN ORGANIZED HEALTH CARE EDUCATION/TRAINING PROGRAM

## 2022-04-18 PROCEDURE — 3008F BODY MASS INDEX DOCD: CPT | Mod: CPTII,S$GLB,, | Performed by: STUDENT IN AN ORGANIZED HEALTH CARE EDUCATION/TRAINING PROGRAM

## 2022-04-18 PROCEDURE — 3008F PR BODY MASS INDEX (BMI) DOCUMENTED: ICD-10-PCS | Mod: CPTII,S$GLB,, | Performed by: STUDENT IN AN ORGANIZED HEALTH CARE EDUCATION/TRAINING PROGRAM

## 2022-04-18 PROCEDURE — 1160F PR REVIEW ALL MEDS BY PRESCRIBER/CLIN PHARMACIST DOCUMENTED: ICD-10-PCS | Mod: CPTII,S$GLB,, | Performed by: STUDENT IN AN ORGANIZED HEALTH CARE EDUCATION/TRAINING PROGRAM

## 2022-04-18 PROCEDURE — 99214 OFFICE O/P EST MOD 30 MIN: CPT | Mod: S$GLB,,, | Performed by: STUDENT IN AN ORGANIZED HEALTH CARE EDUCATION/TRAINING PROGRAM

## 2022-04-18 PROCEDURE — 87502 INFLUENZA DNA AMP PROBE: CPT | Mod: QW,S$GLB,, | Performed by: STUDENT IN AN ORGANIZED HEALTH CARE EDUCATION/TRAINING PROGRAM

## 2022-04-18 PROCEDURE — 99214 PR OFFICE/OUTPT VISIT, EST, LEVL IV, 30-39 MIN: ICD-10-PCS | Mod: S$GLB,,, | Performed by: STUDENT IN AN ORGANIZED HEALTH CARE EDUCATION/TRAINING PROGRAM

## 2022-04-18 PROCEDURE — 3075F SYST BP GE 130 - 139MM HG: CPT | Mod: CPTII,S$GLB,, | Performed by: STUDENT IN AN ORGANIZED HEALTH CARE EDUCATION/TRAINING PROGRAM

## 2022-04-18 PROCEDURE — U0002: ICD-10-PCS | Mod: QW,S$GLB,, | Performed by: STUDENT IN AN ORGANIZED HEALTH CARE EDUCATION/TRAINING PROGRAM

## 2022-04-18 PROCEDURE — U0002 COVID-19 LAB TEST NON-CDC: HCPCS | Mod: QW,S$GLB,, | Performed by: STUDENT IN AN ORGANIZED HEALTH CARE EDUCATION/TRAINING PROGRAM

## 2022-04-18 PROCEDURE — 3079F DIAST BP 80-89 MM HG: CPT | Mod: CPTII,S$GLB,, | Performed by: STUDENT IN AN ORGANIZED HEALTH CARE EDUCATION/TRAINING PROGRAM

## 2022-04-18 RX ORDER — AMOXICILLIN AND CLAVULANATE POTASSIUM 875; 125 MG/1; MG/1
1 TABLET, FILM COATED ORAL EVERY 12 HOURS
Qty: 14 TABLET | Refills: 0 | Status: SHIPPED | OUTPATIENT
Start: 2022-04-18 | End: 2022-04-25

## 2022-04-18 RX ORDER — FLUTICASONE PROPIONATE 50 MCG
1 SPRAY, SUSPENSION (ML) NASAL 2 TIMES DAILY
Qty: 9.9 ML | Refills: 0 | Status: SHIPPED | OUTPATIENT
Start: 2022-04-18 | End: 2022-05-02

## 2022-04-18 RX ORDER — KETOROLAC TROMETHAMINE 30 MG/ML
15 INJECTION, SOLUTION INTRAMUSCULAR; INTRAVENOUS
Status: COMPLETED | OUTPATIENT
Start: 2022-04-18 | End: 2022-04-18

## 2022-04-18 RX ADMIN — KETOROLAC TROMETHAMINE 15 MG: 30 INJECTION, SOLUTION INTRAMUSCULAR; INTRAVENOUS at 06:04

## 2022-04-18 NOTE — PROGRESS NOTES
"Subjective:       Patient ID: Samuel Kilgore is a 61 y.o. female.    Vitals:  height is 5' 6" (1.676 m) and weight is 83.9 kg (185 lb). Her temperature is 99.1 °F (37.3 °C). Her blood pressure is 135/86 and her pulse is 72. Her oxygen saturation is 100%.     Chief Complaint: Headache and Sinus Problem    Pt presents for sinus problem/URI and HA. Reports over last week has had worsening sinus pressure/pain, congestion, productive cough, ear fullness, and R ear pain. Over same time period her chronic migraines have been worsening with typical photophobia, intermittent nausea, and intermittent dizziness. Mucinex-DM and allegra with mild improvement in URI, maxalt with mild improvement in migraine but less than usual. Denied fever, emesis, SoB, chest tightness, CP, hearing changes, ear discharge. Recently seen 03/05/22 and prescribed azithromycin for URI sx's, held rx for 6 days and filled 03/11/22 and had interval improvement between then and now.    Sinus Problem  This is a recurrent problem. The current episode started in the past 7 days. The problem has been gradually worsening since onset. There has been no fever. Her pain is at a severity of 7/10. The pain is moderate. Associated symptoms include chills, congestion, coughing, ear pain, headaches, sinus pressure and sneezing. Pertinent negatives include no hoarse voice, neck pain, shortness of breath, sore throat or swollen glands. Past treatments include acetaminophen and oral decongestants. The treatment provided no relief.       Constitution: Positive for chills and fatigue. Negative for fever.   HENT: Positive for ear pain, congestion, postnasal drip, sinus pain and sinus pressure. Negative for ear discharge, tinnitus, hearing loss, sore throat, trouble swallowing and voice change.    Neck: Negative for neck pain.   Cardiovascular: Negative for chest pain and sob on exertion.   Eyes: Positive for photophobia. Negative for eye discharge, eye itching, eye " redness and blurred vision.   Respiratory: Positive for cough and sputum production. Negative for chest tightness, bloody sputum, shortness of breath, stridor and wheezing.    Gastrointestinal: Positive for nausea. Negative for abdominal pain, vomiting and diarrhea.   Musculoskeletal: Negative for muscle ache.   Skin: Negative for rash.   Allergic/Immunologic: Positive for sneezing. Negative for seasonal allergies.   Neurological: Positive for dizziness, history of vertigo, light-headedness and headaches. Negative for passing out and numbness.   Psychiatric/Behavioral: Negative for confusion.       Objective:      Physical Exam   Constitutional: She is oriented to person, place, and time. She appears well-developed. She does not appear ill. No distress.   HENT:   Head: Normocephalic and atraumatic.   Ears:   Right Ear: Hearing, external ear and ear canal normal. No mastoid tenderness. Tympanic membrane is erythematous and bulging. Tympanic membrane is not perforated and not retracted. A middle ear effusion (serous) is present.   Left Ear: Hearing, tympanic membrane, external ear and ear canal normal. No mastoid tenderness.  No middle ear effusion.   Nose: Mucosal edema and rhinorrhea present. No purulent discharge. Right sinus exhibits maxillary sinus tenderness. Right sinus exhibits no frontal sinus tenderness. Left sinus exhibits maxillary sinus tenderness. Left sinus exhibits no frontal sinus tenderness.   Mouth/Throat: Uvula is midline and mucous membranes are normal. No uvula swelling. Posterior oropharyngeal erythema and cobblestoning present. No oropharyngeal exudate, posterior oropharyngeal edema or tonsillar abscesses. Tonsils are 1+ on the right. Tonsils are 1+ on the left. No tonsillar exudate.   Eyes: Conjunctivae and EOM are normal. Right eye exhibits no discharge. Left eye exhibits no discharge.   Neck: Neck supple.   Cardiovascular: Normal rate, regular rhythm and normal heart sounds.    Pulmonary/Chest: Effort normal and breath sounds normal. No respiratory distress. She has no wheezes. She has no rhonchi. She has no rales.   Musculoskeletal: Normal range of motion.         General: Normal range of motion.   Lymphadenopathy:     She has no cervical adenopathy.   Neurological: She is alert and oriented to person, place, and time. No cranial nerve deficit (CN II-XII grossly intact).   Skin: Skin is warm, dry and no rash.   Psychiatric: Her behavior is normal. Judgment and thought content normal.   Nursing note and vitals reviewed.    Recent Lab Results       04/18/22  1722   04/18/22  1721        POC Molecular Influenza A Ag   Negative       POC Molecular Influenza B Ag   Negative        Acceptable Yes   Yes       SARS-CoV-2 RNA, Amplification, Qual Negative                 Assessment:       1. Acute recurrent maxillary sinusitis    2. Non-recurrent acute serous otitis media of right ear    3. Migraine without aura and without status migrainosus, not intractable    4. Encounter for laboratory testing for COVID-19 virus          Plan:         Acute recurrent maxillary sinusitis  -     POCT COVID-19 Rapid Screening  -     POCT Influenza A/B MOLECULAR  -     fluticasone propionate (FLONASE) 50 mcg/actuation nasal spray; 1 spray (50 mcg total) by Each Nostril route 2 (two) times daily. for 14 days  Dispense: 9.9 mL; Refill: 0  -     amoxicillin-clavulanate 875-125mg (AUGMENTIN) 875-125 mg per tablet; Take 1 tablet by mouth every 12 (twelve) hours. for 7 days  Dispense: 14 tablet; Refill: 0  - counseled on home care and OTC medications    Non-recurrent acute serous otitis media of right ear  -     fluticasone propionate (FLONASE) 50 mcg/actuation nasal spray; 1 spray (50 mcg total) by Each Nostril route 2 (two) times daily. for 14 days  Dispense: 9.9 mL; Refill: 0    Migraine without aura and without status migrainosus, not intractable  -     ketorolac injection 15 mg  - continue maxalt  as prescribed, possibly worsened with concurrent sinusitis    Encounter for laboratory testing for COVID-19 virus  -     POCT COVID-19 Rapid Screening    Results, medications and diagnosis reviewed with patient, questions answered, and return precautions given    Follow up if symptoms worsen or fail to improve.    Ajay Doty MD/MPH  [facility]  [facility]

## 2022-04-20 ENCOUNTER — NURSE TRIAGE (OUTPATIENT)
Dept: ADMINISTRATIVE | Facility: CLINIC | Age: 62
End: 2022-04-20
Payer: MEDICAID

## 2022-04-20 ENCOUNTER — TELEPHONE (OUTPATIENT)
Dept: FAMILY MEDICINE | Facility: CLINIC | Age: 62
End: 2022-04-20
Payer: MEDICAID

## 2022-04-20 NOTE — TELEPHONE ENCOUNTER
"Pt called OOC line and stated she went to [facility]- was given flonase and antibiotic.Pt received a shot of Toradol for migraine and felt better.  Today coughed up some blood. Pt's cough is productive and reports yellow or green sputum..    Pt was advised to see PCP within 24 hours per triage protocol.  Pt verbalized understanding.    Reason for Disposition   [1] Coughing up yellow or green sputum AND [2] present > 5 days    Additional Information   Negative: SEVERE difficulty breathing (e.g., struggling for each breath, speaks in single words)   Negative: [1] Chest pain AND [2] difficulty breathing   Negative: Bluish (or gray) lips or face now   Negative: Passed out (i.e., lost consciousness, collapsed and was not responding)   Negative: Shock suspected (e.g., cold/pale/clammy skin, too weak to stand, low BP, rapid pulse)   Negative: Difficult to awaken or acting confused (e.g., disoriented, slurred speech)   Negative: Recent chest injury (i.e., past 24 hours)   Negative: [1] Coughed up blood AND [2] large amount (example: "a cup of blood")   Negative: Sounds like a life-threatening emergency to the triager   Negative: [1] MODERATE difficulty breathing (e.g., speaks in phrases, SOB even at rest, pulse 100-120) AND [2] still present when not coughing   Negative: Chest pain   Negative: Unclear to triager if the patient is coughing up blood or vomiting blood   Negative: History of prior "blood clot" in leg or lungs (i.e., deep vein thrombosis, pulmonary embolism)   Negative: History of inherited increased risk of blood clots (e.g., Factor 5 Leiden, Anti-thrombin 3, Protein C or Protein S deficiency, Prothrombin mutation)   Negative: Pregnant or pregnant in past month   Negative: Hip or leg fracture (broken bone) in past month (or had cast on leg or ankle in past month)   Negative: Prolonged travel within the last month  (e.g., long bus trip or plane trip)   Negative: Bedridden (e.g., nursing " home patient, CVA, chronic illness, recovering from surgery)   Negative: Patient sounds very sick or weak to the triager   Negative: [1] MILD difficulty breathing (e.g., minimal/no SOB at rest, SOB with walking, pulse <100) AND [2] still present when not coughing (Exception: no change from usual, chronic shortness of breath)   Negative: [1] Coughed up blood AND [2] > 1 tablespoon (15 ml) (Exception: blood-tinged sputum)   Negative: Fever > 103 F (39.4 C)   Negative: [1] Fever > 101 F (38.3 C) AND [2] age > 60 years   Negative: [1] Fever > 100.0 F (37.8 C) AND [2] diabetes mellitus or weak immune system (e.g., HIV positive, cancer chemo, splenectomy, organ transplant, chronic steroids)   Negative: [1] Has underlying lung disease (e.g., COPD, chronic bronchitis or emphysema) AND [2] sputum has turned yellow or green in color   Negative: Coughing up jeffery-colored sputum   Negative: [1] Nasal discharge AND [2] present > 10 days    Protocols used: COUGHING UP BLOOD-A-AH

## 2022-04-21 ENCOUNTER — TELEPHONE (OUTPATIENT)
Dept: URGENT CARE | Facility: CLINIC | Age: 62
End: 2022-04-21
Payer: MEDICAID

## 2022-04-21 ENCOUNTER — OFFICE VISIT (OUTPATIENT)
Dept: FAMILY MEDICINE | Facility: CLINIC | Age: 62
End: 2022-04-21
Payer: MEDICAID

## 2022-04-21 VITALS
HEIGHT: 66 IN | SYSTOLIC BLOOD PRESSURE: 112 MMHG | WEIGHT: 184 LBS | DIASTOLIC BLOOD PRESSURE: 68 MMHG | OXYGEN SATURATION: 95 % | TEMPERATURE: 97 F | BODY MASS INDEX: 29.57 KG/M2 | HEART RATE: 95 BPM

## 2022-04-21 DIAGNOSIS — J01.00 ACUTE NON-RECURRENT MAXILLARY SINUSITIS: Primary | ICD-10-CM

## 2022-04-21 PROCEDURE — 3078F DIAST BP <80 MM HG: CPT | Mod: CPTII,,, | Performed by: INTERNAL MEDICINE

## 2022-04-21 PROCEDURE — 1159F MED LIST DOCD IN RCRD: CPT | Mod: CPTII,,, | Performed by: INTERNAL MEDICINE

## 2022-04-21 PROCEDURE — 99213 OFFICE O/P EST LOW 20 MIN: CPT | Mod: PBBFAC,PO,25 | Performed by: INTERNAL MEDICINE

## 2022-04-21 PROCEDURE — 3008F BODY MASS INDEX DOCD: CPT | Mod: CPTII,,, | Performed by: INTERNAL MEDICINE

## 2022-04-21 PROCEDURE — 3078F PR MOST RECENT DIASTOLIC BLOOD PRESSURE < 80 MM HG: ICD-10-PCS | Mod: CPTII,,, | Performed by: INTERNAL MEDICINE

## 2022-04-21 PROCEDURE — 99213 OFFICE O/P EST LOW 20 MIN: CPT | Mod: S$PBB,,, | Performed by: INTERNAL MEDICINE

## 2022-04-21 PROCEDURE — 99999 PR PBB SHADOW E&M-EST. PATIENT-LVL III: ICD-10-PCS | Mod: PBBFAC,,, | Performed by: INTERNAL MEDICINE

## 2022-04-21 PROCEDURE — 3074F PR MOST RECENT SYSTOLIC BLOOD PRESSURE < 130 MM HG: ICD-10-PCS | Mod: CPTII,,, | Performed by: INTERNAL MEDICINE

## 2022-04-21 PROCEDURE — 99999 PR PBB SHADOW E&M-EST. PATIENT-LVL III: CPT | Mod: PBBFAC,,, | Performed by: INTERNAL MEDICINE

## 2022-04-21 PROCEDURE — 3008F PR BODY MASS INDEX (BMI) DOCUMENTED: ICD-10-PCS | Mod: CPTII,,, | Performed by: INTERNAL MEDICINE

## 2022-04-21 PROCEDURE — 1159F PR MEDICATION LIST DOCUMENTED IN MEDICAL RECORD: ICD-10-PCS | Mod: CPTII,,, | Performed by: INTERNAL MEDICINE

## 2022-04-21 PROCEDURE — 99213 PR OFFICE/OUTPT VISIT, EST, LEVL III, 20-29 MIN: ICD-10-PCS | Mod: S$PBB,,, | Performed by: INTERNAL MEDICINE

## 2022-04-21 PROCEDURE — 96372 THER/PROPH/DIAG INJ SC/IM: CPT | Mod: PBBFAC,PO

## 2022-04-21 PROCEDURE — 3074F SYST BP LT 130 MM HG: CPT | Mod: CPTII,,, | Performed by: INTERNAL MEDICINE

## 2022-04-21 RX ORDER — METHYLPREDNISOLONE ACETATE 80 MG/ML
60 INJECTION, SUSPENSION INTRA-ARTICULAR; INTRALESIONAL; INTRAMUSCULAR; SOFT TISSUE
Status: COMPLETED | OUTPATIENT
Start: 2022-04-21 | End: 2022-04-21

## 2022-04-21 RX ADMIN — METHYLPREDNISOLONE ACETATE 60 MG: 80 INJECTION, SUSPENSION INTRA-ARTICULAR; INTRALESIONAL; INTRAMUSCULAR; SOFT TISSUE at 08:04

## 2022-04-21 NOTE — PROGRESS NOTES
"Subjective:      Patient ID: Samuel Kilgore is a 61 y.o. female.    Chief Complaint: Sinus Problem and Headache      HPI  Pt here for follow up of medical problems and 6 weeks head congestion and cough.  Went to , dx with sinusitis, treated with Zpak.  Got well, then sx returned after a week.  Returned 3d ago, given Augmentin, getting better but getting migraines daily.  3 days ago toradol greatly helped her HA, but returned after 6h.  Unable to use cpap due to head pressure.    Updated/ annual due 10/22:  HM:  10/21 fluvax, 1/18 [id number], 3/16 [id number], 2/19 TDaP, 11/18 MMG no more due to reconstruction flap/ [facility], 7/19 BMD rep 3-5y, 9/12 Cscope rep 10y, Rheum Dr. Harris.     Review of Systems   Constitutional: Negative for chills, diaphoresis and fever.   Respiratory: Negative for cough and shortness of breath.    Cardiovascular: Negative for chest pain, palpitations and leg swelling.   Gastrointestinal: Negative for blood in stool, constipation, diarrhea, nausea and vomiting.   Genitourinary: Negative for dysuria, frequency and hematuria.   Psychiatric/Behavioral: The patient is not nervous/anxious.          Objective:   /68   Pulse 95   Temp 97.2 °F (36.2 °C) (Temporal)   Ht 5' 6" (1.676 m)   Wt 83.4 kg (183 lb 15.6 oz)   SpO2 95%   BMI 29.69 kg/m²     Physical Exam  Constitutional:       Appearance: She is well-developed.   Neck:      Thyroid: No thyroid mass.      Vascular: No carotid bruit.   Cardiovascular:      Rate and Rhythm: Normal rate and regular rhythm.      Heart sounds: No murmur heard.    No friction rub. No gallop.   Pulmonary:      Effort: Pulmonary effort is normal.      Breath sounds: Normal breath sounds. No wheezing or rales.   Abdominal:      General: Bowel sounds are normal.      Palpations: Abdomen is soft. There is no mass.      Tenderness: There is no abdominal tenderness.   Musculoskeletal:      Cervical back: Neck supple.   Lymphadenopathy:      " Cervical: No cervical adenopathy.   Neurological:      Mental Status: She is alert and oriented to person, place, and time.             Assessment:       1. Acute non-recurrent maxillary sinusitis          Plan:     Acute non-recurrent maxillary sinusitis- cont augmentin.  Allegra daily.  Flonase causes migraine, so will avoid nasal steroid.  -     methylPREDNISolone acetate injection 60 mg    RTC as scheduled.

## 2022-05-16 ENCOUNTER — TELEPHONE (OUTPATIENT)
Dept: FAMILY MEDICINE | Facility: CLINIC | Age: 62
End: 2022-05-16
Payer: MEDICAID

## 2022-05-16 ENCOUNTER — OFFICE VISIT (OUTPATIENT)
Dept: URGENT CARE | Facility: CLINIC | Age: 62
End: 2022-05-16
Payer: MEDICAID

## 2022-05-16 VITALS
SYSTOLIC BLOOD PRESSURE: 135 MMHG | HEART RATE: 96 BPM | RESPIRATION RATE: 18 BRPM | BODY MASS INDEX: 29.73 KG/M2 | DIASTOLIC BLOOD PRESSURE: 65 MMHG | WEIGHT: 185 LBS | HEIGHT: 66 IN | TEMPERATURE: 101 F | OXYGEN SATURATION: 96 %

## 2022-05-16 DIAGNOSIS — R05.9 COUGH: ICD-10-CM

## 2022-05-16 DIAGNOSIS — U07.1 COVID-19: Primary | ICD-10-CM

## 2022-05-16 LAB
CTP QC/QA: YES
SARS-COV-2 RDRP RESP QL NAA+PROBE: POSITIVE

## 2022-05-16 PROCEDURE — 3008F PR BODY MASS INDEX (BMI) DOCUMENTED: ICD-10-PCS | Mod: CPTII,S$GLB,, | Performed by: NURSE PRACTITIONER

## 2022-05-16 PROCEDURE — 3075F PR MOST RECENT SYSTOLIC BLOOD PRESS GE 130-139MM HG: ICD-10-PCS | Mod: CPTII,S$GLB,, | Performed by: NURSE PRACTITIONER

## 2022-05-16 PROCEDURE — 3078F DIAST BP <80 MM HG: CPT | Mod: CPTII,S$GLB,, | Performed by: NURSE PRACTITIONER

## 2022-05-16 PROCEDURE — 99213 OFFICE O/P EST LOW 20 MIN: CPT | Mod: S$GLB,,, | Performed by: NURSE PRACTITIONER

## 2022-05-16 PROCEDURE — 3078F PR MOST RECENT DIASTOLIC BLOOD PRESSURE < 80 MM HG: ICD-10-PCS | Mod: CPTII,S$GLB,, | Performed by: NURSE PRACTITIONER

## 2022-05-16 PROCEDURE — 1160F PR REVIEW ALL MEDS BY PRESCRIBER/CLIN PHARMACIST DOCUMENTED: ICD-10-PCS | Mod: CPTII,S$GLB,, | Performed by: NURSE PRACTITIONER

## 2022-05-16 PROCEDURE — 1159F MED LIST DOCD IN RCRD: CPT | Mod: CPTII,S$GLB,, | Performed by: NURSE PRACTITIONER

## 2022-05-16 PROCEDURE — 3075F SYST BP GE 130 - 139MM HG: CPT | Mod: CPTII,S$GLB,, | Performed by: NURSE PRACTITIONER

## 2022-05-16 PROCEDURE — 99213 PR OFFICE/OUTPT VISIT, EST, LEVL III, 20-29 MIN: ICD-10-PCS | Mod: S$GLB,,, | Performed by: NURSE PRACTITIONER

## 2022-05-16 PROCEDURE — U0002 COVID-19 LAB TEST NON-CDC: HCPCS | Mod: QW,S$GLB,, | Performed by: NURSE PRACTITIONER

## 2022-05-16 PROCEDURE — 1159F PR MEDICATION LIST DOCUMENTED IN MEDICAL RECORD: ICD-10-PCS | Mod: CPTII,S$GLB,, | Performed by: NURSE PRACTITIONER

## 2022-05-16 PROCEDURE — 1160F RVW MEDS BY RX/DR IN RCRD: CPT | Mod: CPTII,S$GLB,, | Performed by: NURSE PRACTITIONER

## 2022-05-16 PROCEDURE — U0002: ICD-10-PCS | Mod: QW,S$GLB,, | Performed by: NURSE PRACTITIONER

## 2022-05-16 PROCEDURE — 3008F BODY MASS INDEX DOCD: CPT | Mod: CPTII,S$GLB,, | Performed by: NURSE PRACTITIONER

## 2022-05-16 RX ORDER — BENZONATATE 200 MG/1
200 CAPSULE ORAL 3 TIMES DAILY PRN
Qty: 30 CAPSULE | Refills: 0 | Status: SHIPPED | OUTPATIENT
Start: 2022-05-16 | End: 2022-05-26

## 2022-05-16 NOTE — PATIENT INSTRUCTIONS
Remain quarantined per CDC guidelines.  Get plenty of rest.  Increase fluids.   May apply warm compresses as needed for facial pain and congestion.   Saline nasal spray to loosen nasal congestion.  Humidifier or steamy shower as well as sudafed or guaifenesin (Mucinex) may help with congestion.  Flonase or Nasacort to reduce inflammation in the sinus cavities.  You may take an over the counter 24 hour antihistamine such as Zyrtec or Claritin for allergy symptoms such as sneezing, itchy/watery eyes, scratchy throat, or congestion.  Warm salt water gargles, Cepacol throat lozenges, or Chloraseptic spray for sore throat.  Take Tylenol or Ibuprofen as needed for sore throat, body aches, or fever.  Take Tessalon as prescribed for cough.  You may take a multi-symptom cold medication in the place of individual suggestions listed above.  Diet as tolerated.  Follow up with your primary care provider if symptoms persist >10 days or sooner for any new or worsening symptoms.   Report to the ER for any difficulty breathing, chest pains, or loss of consciousness, or any other new and concerning symptoms.     You have tested positive for COVID-19 today.      ISOLATION  If you tested positive and you have no symptoms, you must isolate for 5 days starting on the day of the positive test.     If you tested positive and have symptoms, you must isolate for 5 days starting on the day of the first symptoms,  not the day of the positive test.     This is the most important part, both the CDC and the [facility] emphasize that you do not test out of isolation.     After 5 days, if your symptoms have improved and you have not had fever on day 5, you can return to the community on day 6- NO TESTING REQUIRED!      In fact, we do not retest if you were positive in the last 90 days.    After your 5 days of isolation are completed, the CDC recommends strict mask use for the first 5 days that you come out of isolation.

## 2022-05-16 NOTE — PROGRESS NOTES
"Subjective:       Patient ID: Samuel Kilgore is a 61 y.o. female.    Vitals:  height is 5' 6" (1.676 m) and weight is 83.9 kg (185 lb). Her tympanic temperature is 101.3 °F (38.5 °C) (abnormal). Her blood pressure is 135/65 and her pulse is 96. Her respiration is 18 and oxygen saturation is 96%.     Chief Complaint: Cough    Pt complaining of fatigue with cough and congestion since Saturday.  Pt states she started feeling nauseous yesterday and started running a fever that got up to 102 last night.  Pt states she lost her sense of smell today.      Cough  This is a new problem. The current episode started in the past 7 days. The problem has been gradually worsening. The problem occurs constantly. The cough is productive of sputum. Associated symptoms include chest pain, chills, a fever, headaches, nasal congestion, postnasal drip and shortness of breath. Pertinent negatives include no ear congestion, ear pain, heartburn, hemoptysis, myalgias, rash, rhinorrhea, sore throat, sweats, weight loss or wheezing. Nothing aggravates the symptoms. She has tried OTC cough suppressant for the symptoms. The treatment provided mild relief.       Constitution: Positive for chills and fever.   HENT: Positive for postnasal drip. Negative for ear pain and sore throat.    Cardiovascular: Positive for chest pain.   Respiratory: Positive for cough and shortness of breath. Negative for bloody sputum and wheezing.    Gastrointestinal: Negative for heartburn.   Musculoskeletal: Negative for muscle ache.   Skin: Negative for rash.   Neurological: Positive for headaches.       Objective:      Physical Exam   Constitutional: She is oriented to person, place, and time. She appears well-developed. She is cooperative.  Non-toxic appearance. She does not appear ill. No distress.   HENT:   Head: Normocephalic and atraumatic.   Ears:   Right Ear: Hearing and external ear normal. No middle ear effusion.   Left Ear: Hearing and external ear normal.  " No middle ear effusion.   Nose: Nose normal. No mucosal edema, rhinorrhea or nasal deformity. No epistaxis. Right sinus exhibits no maxillary sinus tenderness and no frontal sinus tenderness. Left sinus exhibits no maxillary sinus tenderness and no frontal sinus tenderness.   Mouth/Throat: Uvula is midline, oropharynx is clear and moist and mucous membranes are normal. No trismus in the jaw. Normal dentition. No uvula swelling. No oropharyngeal exudate, posterior oropharyngeal edema or posterior oropharyngeal erythema.   Eyes: Conjunctivae and lids are normal. No scleral icterus.   Neck: Trachea normal and phonation normal. Neck supple. No edema present. No erythema present. No neck rigidity present.   Cardiovascular: Normal rate, regular rhythm, normal heart sounds and normal pulses.   Pulmonary/Chest: Effort normal and breath sounds normal. No respiratory distress. She has no decreased breath sounds. She has no wheezes. She has no rhonchi.   Forceful cough         Comments: Forceful cough    Abdominal: Normal appearance.   Musculoskeletal: Normal range of motion.         General: No deformity. Normal range of motion.   Neurological: She is alert and oriented to person, place, and time. She exhibits normal muscle tone. Coordination normal.   Skin: Skin is warm, dry, intact, not diaphoretic and not pale.   Psychiatric: Her speech is normal and behavior is normal. Judgment and thought content normal.   Nursing note and vitals reviewed.        Assessment:       1. COVID-19    2. Cough          Plan:         COVID-19    Cough  -     POCT COVID-19 Rapid Screening  -     benzonatate (TESSALON) 200 MG capsule; Take 1 capsule (200 mg total) by mouth 3 (three) times daily as needed for Cough.  Dispense: 30 capsule; Refill: 0                 Results for orders placed or performed in visit on 05/16/22   POCT COVID-19 Rapid Screening   Result Value Ref Range    POC Rapid COVID Positive (A) Negative      Acceptable Yes      Lab result reviewed and discussed with patient.    MDM: Offered pt antibody infusion treatment. States she will give it a couple of days to see if it gets any worse and call the clinic back if she changes her mind.     · Remain quarantined per CDC guidelines.  · Get plenty of rest.  · Increase fluids.   · May apply warm compresses as needed for facial pain and congestion.   · Saline nasal spray to loosen nasal congestion.  · Humidifier or steamy shower as well as sudafed or guaifenesin (Mucinex) may help with congestion.  · Flonase or Nasacort to reduce inflammation in the sinus cavities.  · You may take an over the counter 24 hour antihistamine such as Zyrtec or Claritin for allergy symptoms such as sneezing, itchy/watery eyes, scratchy throat, or congestion.  · Warm salt water gargles, Cepacol throat lozenges, or Chloraseptic spray for sore throat.  · Take Tylenol or Ibuprofen as needed for sore throat, body aches, or fever.  · Take an over the counter cough suppressant such as Delsym or Robitussin DM as directed on label for cough.  · You may take a multi-symptom cold medication in the place of individual suggestions listed above.  · Diet as tolerated.  · Follow up with your primary care provider if symptoms persist >10 days or sooner for any new or worsening symptoms.   · Report to the ER for any difficulty breathing, chest pains, or loss of consciousness, or any other new and concerning symptoms.     You have tested positive for COVID-19 today.      ISOLATION  If you tested positive and you have no symptoms, you must isolate for 5 days starting on the day of the positive test.     If you tested positive and have symptoms, you must isolate for 5 days starting on the day of the first symptoms,  not the day of the positive test.     This is the most important part, both the CDC and the [facility] emphasize that you do not test out of isolation.     After 5 days, if your symptoms have improved and you  have not had fever on day 5, you can return to the community on day 6- NO TESTING REQUIRED!      In fact, we do not retest if you were positive in the last 90 days.    After your 5 days of isolation are completed, the CDC recommends strict mask use for the first 5 days that you come out of isolation.

## 2022-05-16 NOTE — TELEPHONE ENCOUNTER
Called Pt. Informed her we had no openings currently. Offered her to schedule with other providers. She stated she would go to urgent care and get tested for covid.       ----- Message from Lisbeth Beard sent at 5/16/2022  4:41 PM CDT -----  Type: Requesting to speak with nurse         Who Called: PT  Regarding: Pt needing to be seen- she is feverish 102, hoovering around 100 w. headache... lost of smell- achy joints, stomach and coughing- not sure if its covid- please advise   Would the patient rather a call back or a response via [facility]? Call back  Best Call Back Number: [phone]  Additional Information: n/a

## 2022-05-19 ENCOUNTER — TELEPHONE (OUTPATIENT)
Dept: URGENT CARE | Facility: CLINIC | Age: 62
End: 2022-05-19
Payer: MEDICAID

## 2022-06-14 ENCOUNTER — PATIENT MESSAGE (OUTPATIENT)
Dept: RESEARCH | Facility: HOSPITAL | Age: 62
End: 2022-06-14
Payer: MEDICAID

## 2023-01-29 NOTE — PROGRESS NOTES
Subjective:      Samuel Kilgore is a 62 y.o. female, to the office today for:   ANNUAL WELLNESS    HPI:    Samuel Kilgore has not fasted to have bloodwork done today.  I have reviewed the patient's medical history in detail and updated the computerized patient record.    Health Maintenance Due   Topic Date Due    COVID-19 Vaccine (1) Never done --- pharmacy    HIV Screening  Never done    DEXA Scan  07/23/2020    Pneumococcal Vaccines (Age 0-64) (3 - PPSV23 if available, else PCV20) 03/03/2021    Cervical Cancer Screening  08/02/2022    Influenza Vaccine (1) 09/01/2022    Colorectal Cancer Screening  09/06/2022       Review of Systems   Constitutional:  Positive for fatigue. Negative for activity change, appetite change, chills, diaphoresis, fever and unexpected weight change.        Wt Readings from Last 3 Encounters:  01/30/23 1514 : 97.7 kg (215 lb 4.5 oz)  05/16/22 1718 : 83.9 kg (185 lb)  04/21/22 0759 : 83.4 kg (183 lb 15.6 oz)  Weight gain noted over period of 6 to 7 months.  Reports chronic fatigue/malaise, tries to eat healthy as much as possible.  No exercise d/t fatigue.   HENT: Negative.     Eyes:  Negative for discharge and visual disturbance.   Respiratory:  Negative for cough, shortness of breath and wheezing.         RAYMOND   Cardiovascular:  Positive for chest pain. Negative for palpitations and leg swelling.        Reports episode of CP but thinks just may have been indigestion as she had eaten something with tomato sauce.  However, she is requesting TMST d/t strong family h/o CVD.   Gastrointestinal:  Positive for reflux (tx with essential oils, avoids triggers such as tomato sauce). Negative for abdominal pain, bloating, blood in stool, constipation, diarrhea, nausea, vomiting and fecal incontinence.   Genitourinary: Negative.    Musculoskeletal:  Positive for arthralgias (RT shoulder pain/popping at times; no h/o trauma). Negative for back pain and joint swelling.   Integumentary:  Positive for  hair changes (thinning). Negative for rash and mole/lesion.   Neurological:  Positive for headaches. Negative for vertigo, seizures, syncope and numbness.        HA pain has improved over time, occurring about every other week.  Taking Maxalt as ordered.   Hematological: Negative.    Psychiatric/Behavioral:  Negative for depression and sleep disturbance. The patient is not nervous/anxious.    Breast: negative.        Review of patient's allergies indicates:   Allergen Reactions    No known allergies        Patient Active Problem List   Diagnosis    Migraine headache    Anxiety    Mild vitamin D deficiency    Osteoarthritis of lumbar spine    Ganglion cyst    Spondylosis without myelopathy    Thoracic or lumbosacral neuritis or radiculitis, unspecified    Lumbago    Malignant neoplasm of right breast in female, estrogen receptor positive    Obstructive sleep apnea syndrome    Restless legs    Vitamin B deficiency    Prophylactic use of letrozole (Femara)    Sjogren's syndrome without extraglandular involvement       Current Outpatient Medications:     meclizine (ANTIVERT) 12.5 mg tablet, Take 1 tablet (12.5 mg total) by mouth 3 (three) times daily as needed for Dizziness. (Patient not taking: Reported on 2022), Disp: 30 tablet, Rfl: 1    melatonin 1 mg Tab, Take by mouth., Disp: , Rfl:     naproxen (NAPROSYN) 500 MG tablet, naproxen 500 mg tablet, Disp: , Rfl:     rizatriptan (MAXALT) 10 MG tablet, TAKE 1 TABLET BY MOUTH ONCE AS NEEDED FOR MIGRAINE., Disp: 12 tablet, Rfl: 4      Past Medical History:   Diagnosis Date    Anxiety     Breast cancer     Encounter for blood transfusion     Lumbar spondylosis     Migraine headache     Mild vitamin D deficiency        Past Surgical History:   Procedure Laterality Date    BREAST RECONSTRUCTION Bilateral      SECTION      MASTECTOMY Bilateral        Family History   Problem Relation Age of Onset    Heart disease Maternal Grandfather     Diabetes  "Maternal Grandfather     Colon cancer Paternal Grandmother     Melanoma Paternal Grandmother     Allergies Child     Asthma Child     Cancer Neg Hx     Miscarriages / Stillbirths Neg Hx     Psoriasis Neg Hx     Lupus Neg Hx     Eczema Neg Hx     Acne Neg Hx        Social History     Tobacco Use    Smoking status: Never    Smokeless tobacco: Never   Substance Use Topics    Alcohol use: Yes     Comment: occassionally    Drug use: No         Objective:     Vitals:    01/30/23 1514   BP: 124/78   Pulse: 88   Temp: 98.5 °F (36.9 °C)   SpO2: 95%   Weight: 97.7 kg (215 lb 4.5 oz)   Height: 5' 6" (1.676 m)       Body mass index is 34.75 kg/m².      Physical Exam  Constitutional:       General: She is not in acute distress.     Appearance: She is well-developed. She is not diaphoretic.   HENT:      Head: Normocephalic and atraumatic.      Right Ear: Tympanic membrane, ear canal and external ear normal. There is no impacted cerumen.      Left Ear: Tympanic membrane, ear canal and external ear normal. There is no impacted cerumen.      Nose: Nose normal. No nasal deformity, mucosal edema, congestion or rhinorrhea.      Mouth/Throat:      Mouth: Mucous membranes are moist. Mucous membranes are not dry and not cyanotic. No oral lesions.      Dentition: Normal dentition.      Pharynx: Oropharynx is clear. Uvula midline. No oropharyngeal exudate, posterior oropharyngeal erythema or uvula swelling.      Tonsils: No tonsillar abscesses.   Eyes:      General: Lids are normal. Lids are everted, no foreign bodies appreciated. No scleral icterus.        Right eye: No discharge.         Left eye: No discharge.      Conjunctiva/sclera: Conjunctivae normal.      Right eye: Right conjunctiva is not injected. No exudate.     Left eye: Left conjunctiva is not injected. No exudate.     Pupils: Pupils are equal, round, and reactive to light.   Neck:      Thyroid: No thyroid mass or thyromegaly.      Vascular: No carotid bruit or JVD.      " Trachea: No tracheal deviation.   Cardiovascular:      Rate and Rhythm: Normal rate and regular rhythm.      Pulses: Normal pulses.      Heart sounds: Normal heart sounds. No murmur heard.    No friction rub. No gallop.   Pulmonary:      Effort: Pulmonary effort is normal. No respiratory distress.      Breath sounds: Normal breath sounds. No stridor. No wheezing.   Chest:      Chest wall: No tenderness.   Abdominal:      General: Bowel sounds are normal. There is no distension.      Palpations: Abdomen is soft. There is no mass.      Tenderness: There is no abdominal tenderness. There is no guarding or rebound.   Genitourinary:     Comments: Deferred to Gyn  Musculoskeletal:         General: No swelling, tenderness or deformity. Normal range of motion.      Cervical back: Normal range of motion and neck supple. No edema or erythema. Normal range of motion.   Lymphadenopathy:      Cervical: No cervical adenopathy.   Skin:     General: Skin is warm and dry.      Capillary Refill: Capillary refill takes less than 2 seconds.      Coloration: Skin is not pale.      Findings: No bruising, erythema or rash.   Neurological:      Mental Status: She is alert and oriented to person, place, and time.      Sensory: No sensory deficit.      Motor: No weakness, tremor, atrophy or abnormal muscle tone.      Coordination: Coordination normal.      Gait: Gait normal.      Deep Tendon Reflexes: Reflexes are normal and symmetric.   Psychiatric:         Mood and Affect: Mood normal.         Speech: Speech normal.         Behavior: Behavior normal.         Thought Content: Thought content normal.         Cognition and Memory: Memory is not impaired.         Judgment: Judgment normal.       LABS REVIEWED:    Lab Results   Component Value Date    WBC 5.74 10/19/2021    RBC 4.50 10/19/2021    HGB 13.7 10/19/2021    HCT 41.9 10/19/2021    MCV 93 10/19/2021    MCH 30.4 10/19/2021    MCHC 32.7 10/19/2021    RDW 12.4 10/19/2021      10/19/2021    MPV 9.9 10/19/2021    GRAN 3.4 10/19/2021    GRAN 59.9 10/19/2021    LYMPH 1.7 10/19/2021    LYMPH 29.6 10/19/2021    MONO 0.5 10/19/2021    MONO 8.2 10/19/2021    EOS 0.1 10/19/2021    BASO 0.03 10/19/2021    EOSINOPHIL 1.6 10/19/2021    BASOPHIL 0.5 10/19/2021       Sodium   Date Value Ref Range Status   10/19/2021 142 136 - 145 mmol/L Final     Potassium   Date Value Ref Range Status   10/19/2021 4.4 3.5 - 5.1 mmol/L Final     Chloride   Date Value Ref Range Status   10/19/2021 103 95 - 110 mmol/L Final     CO2   Date Value Ref Range Status   10/19/2021 30 (H) 23 - 29 mmol/L Final     Glucose   Date Value Ref Range Status   10/19/2021 86 70 - 110 mg/dL Final     BUN   Date Value Ref Range Status   10/19/2021 18 6 - 20 mg/dL Final     Creatinine   Date Value Ref Range Status   10/19/2021 0.8 0.5 - 1.4 mg/dL Final     Calcium   Date Value Ref Range Status   10/19/2021 10.3 8.7 - 10.5 mg/dL Final     Total Protein   Date Value Ref Range Status   10/19/2021 7.7 6.0 - 8.4 g/dL Final     Albumin   Date Value Ref Range Status   10/19/2021 4.6 3.5 - 5.2 g/dL Final     Total Bilirubin   Date Value Ref Range Status   10/19/2021 1.1 (H) 0.1 - 1.0 mg/dL Final     Comment:     For infants and newborns, interpretation of results should be based  on gestational age, weight and in agreement with clinical  observations.    Premature Infant recommended reference ranges:  Up to 24 hours.............<8.0 mg/dL  Up to 48 hours............<12.0 mg/dL  3-5 days..................<15.0 mg/dL  6-29 days.................<15.0 mg/dL       Alkaline Phosphatase   Date Value Ref Range Status   10/19/2021 72 55 - 135 U/L Final     AST   Date Value Ref Range Status   10/19/2021 17 10 - 40 U/L Final     ALT   Date Value Ref Range Status   10/19/2021 21 10 - 44 U/L Final     Anion Gap   Date Value Ref Range Status   10/19/2021 9 8 - 16 mmol/L Final       eGFR if non    Date Value Ref Range Status   10/19/2021 >60.0  >60 mL/min/1.73 m^2 Final     Comment:     Calculation used to obtain the estimated glomerular filtration  rate (eGFR) is the CKD-EPI equation.           Lab Results   Component Value Date    CHOL 202 (H) 10/19/2021     Lab Results   Component Value Date    TRIG 104 10/19/2021     Lab Results   Component Value Date    HDL 70 10/19/2021     Lab Results   Component Value Date    LDLCALC 111.2 10/19/2021       Lab Results   Component Value Date    TSH 1.259 10/19/2021    F5SIZQS 7.1 02/27/2012       Lab Results   Component Value Date    HGBA1C 5.1 10/19/2021       Vit D, 25-Hydroxy   Date Value Ref Range Status   10/19/2021 58 30 - 96 ng/mL Final     Comment:     Vitamin D deficiency.........<10 ng/mL                              Vitamin D insufficiency......10-29 ng/mL       Vitamin D sufficiency........> or equal to 30 ng/mL  Vitamin D toxicity............>100 ng/mL       Lab Results   Component Value Date    HEPAIGM Negative 09/05/2014    HEPBIGM Negative 09/05/2014    HEPCAB Negative 09/05/2014         Diagnosis/Assessment:     1. Preventative health care  - DXA Bone Density Spine And Hip; Future  - Ambulatory referral/consult to Endo Procedure ; Future  - CBC Auto Differential; Future  - Comprehensive Metabolic Panel; Future  - TSH; Future  - Lipid Panel; Future  - Hemoglobin A1C; Future  - Vitamin B12; Future  - Vitamin D; Future    2. Hypercholesterolemia  - CBC Auto Differential; Future  - Comprehensive Metabolic Panel; Future  - TSH; Future  - Lipid Panel; Future  - Hemoglobin A1C; Future    3. Chronic fatigue and malaise --- exact cause unclear, check lab  - CBC Auto Differential; Future  - Comprehensive Metabolic Panel; Future  - TSH; Future  - Hemoglobin A1C; Future  - Vitamin B12; Future  - Vitamin D; Future    4. Migraine without aura and without status migrainosus, not intractable --- improving over time, on Maxalt  - CBC Auto Differential; Future  - Comprehensive Metabolic Panel; Future  - TSH;  Future    5. Obstructive sleep apnea syndrome ---- per Pulmonary    6. Sjogren's syndrome without extraglandular involvement --- per Rheumatology    7. Vitamin D deficiency disease  - Vitamin D; Future    8. B12 nutritional deficiency  - Vitamin B12; Future    9. Malignant neoplasm of axillary tail of right breast in female, estrogen receptor positive --- per Oncology    10. Chest pain, unspecified type --- cardiac vs digestive ???  - Exercise Stress - EKG; Future    11. Family history of cardiovascular disease --- see # 10  - Exercise Stress - EKG; Future    12. Symptoms referable to shoulder joint --- may consider PT, check XR first  - X-ray Shoulder 2 or More Views Right; Future    13. Class 1 obesity with serious comorbidity and body mass index (BMI) of 34.0 to 34.9 in adult, unspecified obesity type  - CBC Auto Differential; Future  - Comprehensive Metabolic Panel; Future  - TSH; Future  - Lipid Panel; Future  - Hemoglobin A1C; Future  - Vitamin D; Future    14. Colon cancer screening  - Ambulatory referral/consult to Endo Procedure ; Future       Plan:     Healthy dietary and lifestyle changes discussed.  Refuses vaccines today.    Follow-Up:     Pending lab.  RTC as directed or prn.        LE Almeida  [facility]

## 2023-01-30 ENCOUNTER — HOSPITAL ENCOUNTER (OUTPATIENT)
Dept: RADIOLOGY | Facility: HOSPITAL | Age: 63
Discharge: HOME | End: 2023-01-30
Attending: REGISTERED NURSE
Payer: MEDICAID

## 2023-01-30 ENCOUNTER — OFFICE VISIT (OUTPATIENT)
Dept: FAMILY MEDICINE | Facility: CLINIC | Age: 63
End: 2023-01-30
Payer: MEDICAID

## 2023-01-30 VITALS
SYSTOLIC BLOOD PRESSURE: 124 MMHG | HEIGHT: 66 IN | HEART RATE: 88 BPM | TEMPERATURE: 99 F | BODY MASS INDEX: 34.59 KG/M2 | WEIGHT: 215.25 LBS | OXYGEN SATURATION: 95 % | DIASTOLIC BLOOD PRESSURE: 78 MMHG

## 2023-01-30 DIAGNOSIS — Z12.11 COLON CANCER SCREENING: ICD-10-CM

## 2023-01-30 DIAGNOSIS — R29.91 SYMPTOMS REFERABLE TO SHOULDER JOINT: ICD-10-CM

## 2023-01-30 DIAGNOSIS — G47.33 OBSTRUCTIVE SLEEP APNEA SYNDROME: ICD-10-CM

## 2023-01-30 DIAGNOSIS — E55.9 VITAMIN D DEFICIENCY DISEASE: ICD-10-CM

## 2023-01-30 DIAGNOSIS — Z00.00 PREVENTATIVE HEALTH CARE: Primary | ICD-10-CM

## 2023-01-30 DIAGNOSIS — R53.81 CHRONIC FATIGUE AND MALAISE: ICD-10-CM

## 2023-01-30 DIAGNOSIS — R07.9 CHEST PAIN, UNSPECIFIED TYPE: ICD-10-CM

## 2023-01-30 DIAGNOSIS — Z82.49 FAMILY HISTORY OF CARDIOVASCULAR DISEASE: ICD-10-CM

## 2023-01-30 DIAGNOSIS — G43.009 MIGRAINE WITHOUT AURA AND WITHOUT STATUS MIGRAINOSUS, NOT INTRACTABLE: Chronic | ICD-10-CM

## 2023-01-30 DIAGNOSIS — E66.9 CLASS 1 OBESITY WITH SERIOUS COMORBIDITY AND BODY MASS INDEX (BMI) OF 34.0 TO 34.9 IN ADULT, UNSPECIFIED OBESITY TYPE: ICD-10-CM

## 2023-01-30 DIAGNOSIS — E53.8 B12 NUTRITIONAL DEFICIENCY: ICD-10-CM

## 2023-01-30 DIAGNOSIS — R53.82 CHRONIC FATIGUE AND MALAISE: ICD-10-CM

## 2023-01-30 DIAGNOSIS — E78.00 HYPERCHOLESTEROLEMIA: ICD-10-CM

## 2023-01-30 DIAGNOSIS — M35.00 SJOGREN'S SYNDROME WITHOUT EXTRAGLANDULAR INVOLVEMENT: ICD-10-CM

## 2023-01-30 DIAGNOSIS — Z17.0 MALIGNANT NEOPLASM OF AXILLARY TAIL OF RIGHT BREAST IN FEMALE, ESTROGEN RECEPTOR POSITIVE: ICD-10-CM

## 2023-01-30 DIAGNOSIS — M25.511 RIGHT SHOULDER PAIN, UNSPECIFIED CHRONICITY: Primary | ICD-10-CM

## 2023-01-30 DIAGNOSIS — C50.611 MALIGNANT NEOPLASM OF AXILLARY TAIL OF RIGHT BREAST IN FEMALE, ESTROGEN RECEPTOR POSITIVE: ICD-10-CM

## 2023-01-30 PROCEDURE — 3078F PR MOST RECENT DIASTOLIC BLOOD PRESSURE < 80 MM HG: ICD-10-PCS | Mod: CPTII,,, | Performed by: REGISTERED NURSE

## 2023-01-30 PROCEDURE — 99214 OFFICE O/P EST MOD 30 MIN: CPT | Mod: PBBFAC,PO | Performed by: REGISTERED NURSE

## 2023-01-30 PROCEDURE — 73030 X-RAY EXAM OF SHOULDER: CPT | Mod: 26,RT,, | Performed by: RADIOLOGY

## 2023-01-30 PROCEDURE — 3008F BODY MASS INDEX DOCD: CPT | Mod: CPTII,,, | Performed by: REGISTERED NURSE

## 2023-01-30 PROCEDURE — 99999 PR PBB SHADOW E&M-EST. PATIENT-LVL IV: CPT | Mod: PBBFAC,,, | Performed by: REGISTERED NURSE

## 2023-01-30 PROCEDURE — 3078F DIAST BP <80 MM HG: CPT | Mod: CPTII,,, | Performed by: REGISTERED NURSE

## 2023-01-30 PROCEDURE — 73030 XR SHOULDER COMPLETE 2 OR MORE VIEWS RIGHT: ICD-10-PCS | Mod: 26,RT,, | Performed by: RADIOLOGY

## 2023-01-30 PROCEDURE — 3074F PR MOST RECENT SYSTOLIC BLOOD PRESSURE < 130 MM HG: ICD-10-PCS | Mod: CPTII,,, | Performed by: REGISTERED NURSE

## 2023-01-30 PROCEDURE — 1159F MED LIST DOCD IN RCRD: CPT | Mod: CPTII,,, | Performed by: REGISTERED NURSE

## 2023-01-30 PROCEDURE — 1159F PR MEDICATION LIST DOCUMENTED IN MEDICAL RECORD: ICD-10-PCS | Mod: CPTII,,, | Performed by: REGISTERED NURSE

## 2023-01-30 PROCEDURE — 3074F SYST BP LT 130 MM HG: CPT | Mod: CPTII,,, | Performed by: REGISTERED NURSE

## 2023-01-30 PROCEDURE — 3008F PR BODY MASS INDEX (BMI) DOCUMENTED: ICD-10-PCS | Mod: CPTII,,, | Performed by: REGISTERED NURSE

## 2023-01-30 PROCEDURE — 73030 X-RAY EXAM OF SHOULDER: CPT | Mod: TC,FY,PO,RT

## 2023-01-30 PROCEDURE — 99999 PR PBB SHADOW E&M-EST. PATIENT-LVL IV: ICD-10-PCS | Mod: PBBFAC,,, | Performed by: REGISTERED NURSE

## 2023-01-30 PROCEDURE — 99396 PR PREVENTIVE VISIT,EST,40-64: ICD-10-PCS | Mod: S$PBB,,, | Performed by: REGISTERED NURSE

## 2023-01-30 PROCEDURE — 99396 PREV VISIT EST AGE 40-64: CPT | Mod: S$PBB,,, | Performed by: REGISTERED NURSE

## 2023-01-31 ENCOUNTER — PATIENT MESSAGE (OUTPATIENT)
Dept: FAMILY MEDICINE | Facility: CLINIC | Age: 63
End: 2023-01-31
Payer: MEDICAID

## 2023-02-02 ENCOUNTER — HOSPITAL ENCOUNTER (OUTPATIENT)
Dept: PREADMISSION TESTING | Facility: HOSPITAL | Age: 63
Discharge: HOME | End: 2023-02-02
Attending: REGISTERED NURSE
Payer: MEDICAID

## 2023-02-02 DIAGNOSIS — Z00.00 PREVENTATIVE HEALTH CARE: ICD-10-CM

## 2023-02-02 DIAGNOSIS — Z12.11 COLON CANCER SCREENING: ICD-10-CM

## 2023-02-08 ENCOUNTER — APPOINTMENT (OUTPATIENT)
Dept: RADIOLOGY | Facility: HOSPITAL | Age: 63
End: 2023-02-08
Attending: REGISTERED NURSE
Payer: MEDICAID

## 2023-02-08 DIAGNOSIS — Z00.00 PREVENTATIVE HEALTH CARE: ICD-10-CM

## 2023-02-08 PROCEDURE — 77080 DXA BONE DENSITY AXIAL: CPT | Mod: TC

## 2023-02-08 PROCEDURE — 77080 DEXA BONE DENSITY SPINE HIP: ICD-10-PCS | Mod: 26,,, | Performed by: RADIOLOGY

## 2023-02-08 PROCEDURE — 77080 DXA BONE DENSITY AXIAL: CPT | Mod: 26,,, | Performed by: RADIOLOGY

## 2023-03-01 ENCOUNTER — HOSPITAL ENCOUNTER (OUTPATIENT)
Dept: CARDIOLOGY | Facility: HOSPITAL | Age: 63
Discharge: HOME | End: 2023-03-01
Attending: REGISTERED NURSE
Payer: MEDICAID

## 2023-03-01 DIAGNOSIS — R07.9 CHEST PAIN, UNSPECIFIED TYPE: ICD-10-CM

## 2023-03-01 DIAGNOSIS — Z82.49 FAMILY HISTORY OF CARDIOVASCULAR DISEASE: ICD-10-CM

## 2023-03-01 PROCEDURE — 93018 CV STRESS TEST I&R ONLY: CPT | Mod: ,,, | Performed by: INTERNAL MEDICINE

## 2023-03-01 PROCEDURE — 93016 CV STRESS TEST SUPVJ ONLY: CPT | Mod: ,,, | Performed by: INTERNAL MEDICINE

## 2023-03-01 PROCEDURE — 93016 EXERCISE STRESS - EKG (CUPID ONLY): ICD-10-PCS | Mod: ,,, | Performed by: INTERNAL MEDICINE

## 2023-03-01 PROCEDURE — 93018 EXERCISE STRESS - EKG (CUPID ONLY): ICD-10-PCS | Mod: ,,, | Performed by: INTERNAL MEDICINE

## 2023-03-02 LAB
CV STRESS BASE HR: 85 BPM
DIASTOLIC BLOOD PRESSURE: 76 MMHG
OHS CV CPX 1 MINUTE RECOVERY HEART RATE: 136 BPM
OHS CV CPX 85 PERCENT MAX PREDICTED HEART RATE MALE: 129
OHS CV CPX ESTIMATED METS: 7
OHS CV CPX MAX PREDICTED HEART RATE: 151
OHS CV CPX PATIENT IS FEMALE: 1
OHS CV CPX PATIENT IS MALE: 0
OHS CV CPX PEAK DIASTOLIC BLOOD PRESSURE: 63 MMHG
OHS CV CPX PEAK HEAR RATE: 164 BPM
OHS CV CPX PEAK RATE PRESSURE PRODUCT: NORMAL
OHS CV CPX PEAK SYSTOLIC BLOOD PRESSURE: 163 MMHG
OHS CV CPX PERCENT MAX PREDICTED HEART RATE ACHIEVED: 108
OHS CV CPX RATE PRESSURE PRODUCT PRESENTING: NORMAL
STRESS ECHO POST EXERCISE DUR MIN: 8 MINUTES
STRESS ECHO POST EXERCISE DUR SEC: 6 SECONDS
SYSTOLIC BLOOD PRESSURE: 128 MMHG

## 2023-06-16 ENCOUNTER — HOSPITAL ENCOUNTER (OUTPATIENT)
Dept: PREADMISSION TESTING | Facility: HOSPITAL | Age: 63
Discharge: HOME | End: 2023-06-16
Attending: INTERNAL MEDICINE
Payer: COMMERCIAL

## 2023-06-16 DIAGNOSIS — Z12.11 COLON CANCER SCREENING: Primary | ICD-10-CM

## 2023-06-16 RX ORDER — SODIUM, POTASSIUM,MAG SULFATES 17.5-3.13G
1 SOLUTION, RECONSTITUTED, ORAL ORAL DAILY
Qty: 1 KIT | Refills: 0 | Status: SHIPPED | OUTPATIENT
Start: 2023-06-16 | End: 2023-06-18

## 2023-06-27 ENCOUNTER — ANESTHESIA EVENT (OUTPATIENT)
Dept: ENDOSCOPY | Facility: HOSPITAL | Age: 63
End: 2023-06-27
Payer: MEDICAID

## 2023-06-27 NOTE — ANESTHESIA PREPROCEDURE EVALUATION
2023  Samuel Kilgore is a 62 y.o., female.  Patient Active Problem List   Diagnosis    Migraine headache    Anxiety    Mild vitamin D deficiency    Osteoarthritis of lumbar spine    Ganglion cyst    Spondylosis without myelopathy    Thoracic or lumbosacral neuritis or radiculitis, unspecified    Lumbago    Malignant neoplasm of right breast in female, estrogen receptor positive    Obstructive sleep apnea syndrome    Restless legs    Vitamin B deficiency    Prophylactic use of letrozole (Femara)    Sjogren's syndrome without extraglandular involvement     Past Surgical History:   Procedure Laterality Date    BREAST RECONSTRUCTION Bilateral      SECTION      MASTECTOMY Bilateral 2010     3/2023 Stress EKG       The patient exercised for 8 minutes 6 seconds on a Onur protocol, corresponding to a functional capacity of 7 METS, achieving a peak heart rate of 164 bpm, which is 108 % of the age predicted maximum heart rate. Their exercise capacity was normal.    The ECG portion of the study is negative for ischemia.    The patient reported no chest pain during the stress test.    The blood pressure response to stress was normal.    There were no arrhythmias during stress.    The exercise capacity was normal.        Pre-op Assessment    I have reviewed the Patient Summary Reports.     I have reviewed the Nursing Notes. I have reviewed the NPO Status.   I have reviewed the Medications.     Review of Systems  Anesthesia Hx:  No problems with previous Anesthesia  Denies Family Hx of Anesthesia complications.   Denies Personal Hx of Anesthesia complications.   Social:  Non-Smoker, Social Alcohol Use    Hematology/Oncology:  Hematology Normal       -- Cancer in past history:  Breast   EENT/Dental:EENT/Dental Normal   Cardiovascular:   2016 Echo w nml EF    Pulmonary:   Sleep  Apnea    Renal/:  Renal/ Normal     Hepatic/GI:   Bowel Prep.    Musculoskeletal:   Arthritis   Rheumatic Disease, Sjogren Syndrome  Spine Disorders: lumbar    Neurological:   Headaches    Endocrine:  Obesity / BMI > 30  Dermatological:  Skin Normal    Psych:   anxiety          Physical Exam  General: Well nourished, Cooperative, Alert and Oriented    Airway:  Mallampati: II   Mouth Opening: Normal  TM Distance: Normal  Tongue: Normal  Neck ROM: Normal ROM    Dental:  Intact        Anesthesia Plan  Type of Anesthesia, risks & benefits discussed:    Anesthesia Type: Gen Natural Airway  Intra-op Monitoring Plan: Standard ASA Monitors  Post Op Pain Control Plan: multimodal analgesia  Induction:  IV  Informed Consent: Informed consent signed with the Patient and all parties understand the risks and agree with anesthesia plan.  All questions answered. Patient consented to blood products? No  ASA Score: 2  Day of Surgery Review of History & Physical: H&P Update referred to the surgeon/provider.    Ready For Surgery From Anesthesia Perspective.     .

## 2023-06-28 ENCOUNTER — HOSPITAL ENCOUNTER (OUTPATIENT)
Facility: HOSPITAL | Age: 63
Discharge: HOME | End: 2023-06-28
Attending: INTERNAL MEDICINE | Admitting: INTERNAL MEDICINE
Payer: COMMERCIAL

## 2023-06-28 ENCOUNTER — ANESTHESIA (OUTPATIENT)
Dept: ENDOSCOPY | Facility: HOSPITAL | Age: 63
End: 2023-06-28
Payer: MEDICAID

## 2023-06-28 VITALS
HEIGHT: 66 IN | RESPIRATION RATE: 16 BRPM | OXYGEN SATURATION: 98 % | DIASTOLIC BLOOD PRESSURE: 69 MMHG | BODY MASS INDEX: 34.68 KG/M2 | TEMPERATURE: 98 F | HEART RATE: 62 BPM | WEIGHT: 215.81 LBS | SYSTOLIC BLOOD PRESSURE: 119 MMHG

## 2023-06-28 DIAGNOSIS — Z12.11 COLON CANCER SCREENING: Primary | ICD-10-CM

## 2023-06-28 PROCEDURE — G0121 COLON CA SCRN NOT HI RSK IND: HCPCS | Performed by: INTERNAL MEDICINE

## 2023-06-28 PROCEDURE — 00812 ANES LWR INTST SCR COLSC: CPT | Performed by: INTERNAL MEDICINE

## 2023-06-28 PROCEDURE — 25000003 PHARM REV CODE 250: Performed by: INTERNAL MEDICINE

## 2023-06-28 PROCEDURE — 63600175 PHARM REV CODE 636 W HCPCS: Performed by: INTERNAL MEDICINE

## 2023-06-28 PROCEDURE — 37000009 HC ANESTHESIA EA ADD 15 MINS: Performed by: INTERNAL MEDICINE

## 2023-06-28 PROCEDURE — 25000003 PHARM REV CODE 250: Performed by: NURSE ANESTHETIST, CERTIFIED REGISTERED

## 2023-06-28 PROCEDURE — 63600175 PHARM REV CODE 636 W HCPCS: Performed by: NURSE ANESTHETIST, CERTIFIED REGISTERED

## 2023-06-28 PROCEDURE — D9220A PRA ANESTHESIA: Mod: ,,, | Performed by: NURSE ANESTHETIST, CERTIFIED REGISTERED

## 2023-06-28 PROCEDURE — 45378 PR COLONOSCOPY,DIAGNOSTIC: ICD-10-PCS | Mod: ,,, | Performed by: INTERNAL MEDICINE

## 2023-06-28 PROCEDURE — 37000008 HC ANESTHESIA 1ST 15 MINUTES: Performed by: INTERNAL MEDICINE

## 2023-06-28 PROCEDURE — 45378 DIAGNOSTIC COLONOSCOPY: CPT | Mod: ,,, | Performed by: INTERNAL MEDICINE

## 2023-06-28 PROCEDURE — D9220A PRA ANESTHESIA: ICD-10-PCS | Mod: ,,, | Performed by: NURSE ANESTHETIST, CERTIFIED REGISTERED

## 2023-06-28 RX ORDER — SODIUM CHLORIDE, SODIUM LACTATE, POTASSIUM CHLORIDE, CALCIUM CHLORIDE 600; 310; 30; 20 MG/100ML; MG/100ML; MG/100ML; MG/100ML
INJECTION, SOLUTION INTRAVENOUS CONTINUOUS
Status: ACTIVE | OUTPATIENT
Start: 2023-06-28

## 2023-06-28 RX ORDER — DEXTROMETHORPHAN/PSEUDOEPHED 2.5-7.5/.8
DROPS ORAL
Status: DISCONTINUED | OUTPATIENT
Start: 2023-06-28 | End: 2023-06-28 | Stop reason: HOSPADM

## 2023-06-28 RX ORDER — LIDOCAINE HYDROCHLORIDE 10 MG/ML
INJECTION, SOLUTION EPIDURAL; INFILTRATION; INTRACAUDAL; PERINEURAL
Status: DISCONTINUED | OUTPATIENT
Start: 2023-06-28 | End: 2023-06-28

## 2023-06-28 RX ORDER — PROPOFOL 10 MG/ML
VIAL (ML) INTRAVENOUS
Status: DISCONTINUED | OUTPATIENT
Start: 2023-06-28 | End: 2023-06-28

## 2023-06-28 RX ADMIN — PROPOFOL 80 MG: 10 INJECTION, EMULSION INTRAVENOUS at 11:06

## 2023-06-28 RX ADMIN — PROPOFOL 40 MG: 10 INJECTION, EMULSION INTRAVENOUS at 11:06

## 2023-06-28 RX ADMIN — LIDOCAINE HYDROCHLORIDE 40 MG: 10 INJECTION, SOLUTION EPIDURAL; INFILTRATION; INTRACAUDAL; PERINEURAL at 11:06

## 2023-06-28 RX ADMIN — SODIUM CHLORIDE, SODIUM LACTATE, POTASSIUM CHLORIDE, AND CALCIUM CHLORIDE: 600; 310; 30; 20 INJECTION, SOLUTION INTRAVENOUS at 10:06

## 2023-06-28 NOTE — PROVATION PATIENT INSTRUCTIONS
Discharge Summary/Instructions after an Endoscopic Procedure  Patient Name: Samuel Kilgore  Patient MRN: [id number]  Patient YOB: 1960 Wednesday, June 28, 2023  Kathy Stewart MD  Dear patient,  As a result of recent federal legislation (The Federal Cures Act), you may   receive lab or pathology results from your procedure in your [facility]   account before your physician is able to contact you. Your physician or   their representative will relay the results to you with their   recommendations at their soonest availability.  Thank you,  RESTRICTIONS:  During your procedure today, you received medications for sedation.  These   medications may affect your judgment, balance and coordination.  Therefore,   for 24 hours, you have the following restrictions:   - DO NOT drive a car, operate machinery, make legal/financial decisions,   sign important papers or drink alcohol.    ACTIVITY:  Today: no heavy lifting, straining or running due to procedural   sedation/anesthesia.  The following day: return to full activity including work.  DIET:  Eat and drink normally unless instructed otherwise.     TREATMENT FOR COMMON SIDE EFFECTS:  - Mild abdominal pain, nausea, belching, bloating or excessive gas:  rest,   eat lightly and use a heating pad.  - Sore Throat: treat with throat lozenges and/or gargle with warm salt   water.  - Because air was used during the procedure, expelling large amounts of air   from your rectum or belching is normal.  - If a bowel prep was taken, you may not have a bowel movement for 1-3 days.    This is normal.  SYMPTOMS TO WATCH FOR AND REPORT TO YOUR PHYSICIAN:  1. Abdominal pain or bloating, other than gas cramps.  2. Chest pain.  3. Back pain.  4. Signs of infection such as: chills or fever occurring within 24 hours   after the procedure.  5. Rectal bleeding, which would show as bright red, maroon, or black stools.   (A tablespoon of blood from the rectum is not serious, especially  if   hemorrhoids are present.)  6. Vomiting.  7. Weakness or dizziness.  GO DIRECTLY TO THE NEAREST EMERGENCY ROOM IF YOU HAVE ANY OF THE FOLLOWING:      Difficulty breathing              Chills and/or fever over 101 F   Persistent vomiting and/or vomiting blood   Severe abdominal pain   Severe chest pain   Black, tarry stools   Bleeding- more than one tablespoon   Any other symptom or condition that you feel may need urgent attention  Your doctor recommends these additional instructions:  If any biopsies were taken, your doctors clinic will contact you in 1 to 2   weeks with any results.  - Patient has a contact number available for emergencies.  The signs and   symptoms of potential delayed complications were discussed with the   patient.  Return to normal activities tomorrow.  Written discharge   instructions were provided to the patient.   - Discharge patient to home (via wheelchair).   - Resume previous diet today.   - Continue present medications.   - Repeat colonoscopy in 10 years for screening purposes.  For questions, problems or results please call your physician Kathy Stewart MD at Work:  [phone]  If you have any questions about the above instructions, call the GI   department at [phone] or call the endoscopy unit at [phone]   from 7am until 3 pm.  [facility] [place], EMERGENCY ROOM PHONE NUMBER:   [phone]  IF A COMPLICATION OR EMERGENCY SITUATION ARISES AND YOU ARE UNABLE TO REACH   YOUR PHYSICIAN - GO DIRECTLY TO THE EMERGENCY ROOM.  I have read or have had read to me these discharge instructions for my   procedure and have received a written copy.  I understand these   instructions and will follow-up with my physician if I have any questions.     __________________________________       _____________________________________  Nurse Signature                                          Patient/Designated   Responsible Party Signature  MD Kathy Valdivia  TERRI Stewart MD  6/28/2023 11:19:49 AM  This report has been verified and signed electronically.  Dear patient,  As a result of recent federal legislation (The Federal Cures Act), you may   receive lab or pathology results from your procedure in your [facility]   account before your physician is able to contact you. Your physician or   their representative will relay the results to you with their   recommendations at their soonest availability.  Thank you,  PROVATION

## 2023-06-28 NOTE — TRANSFER OF CARE
"Anesthesia Transfer of Care Note    Patient: Samuel Kilgore    Procedure(s) Performed: Procedure(s) (LRB):  COLONOSCOPY (N/A)    Patient location: PACU    Anesthesia Type: general    Transport from OR: Transported from OR on room air with adequate spontaneous ventilation    Post pain: adequate analgesia    Post assessment: no apparent anesthetic complications    Post vital signs: stable    Level of consciousness: awake    Nausea/Vomiting: no nausea/vomiting    Complications: none    Transfer of care protocol was followed      Last vitals:   Visit Vitals  /77 (BP Location: Left arm, Patient Position: Lying)   Pulse 60   Temp 36.7 °C (98 °F) (Temporal)   Resp 18   Ht 5' 6" (1.676 m)   Wt 97.9 kg (215 lb 13.3 oz)   SpO2 100%   BMI 34.84 kg/m²     "

## 2023-06-28 NOTE — H&P
PRE PROCEDURE H&P    Patient Name: Samuel Kilgore  MRN: [id number]  : 1960  Date of Procedure:  2023  Referring Physician: Kristopher Sanchez NP  Primary Physician: Primary Doctor No  Procedure Physician: Kathy Stewart MD       Planned Procedure: Colonoscopy  Diagnosis: screening for colon cancer  Chief Complaint: Same as above    HPI: Patient is an 62 y.o. female is here for the above.     Last colonoscopy: 12 years   Family history: grandmother   Anticoagulation: none     Past Medical History:   Past Medical History:   Diagnosis Date    Anxiety     Breast cancer     Encounter for blood transfusion     Lumbar spondylosis     Migraine headache     Mild vitamin D deficiency         Past Surgical History:  Past Surgical History:   Procedure Laterality Date    BREAST RECONSTRUCTION Bilateral 2010     SECTION      MASTECTOMY Bilateral 2010        Home Medications:  Prior to Admission medications    Medication Sig Start Date End Date Taking? Authorizing Provider   meclizine (ANTIVERT) 12.5 mg tablet Take 1 tablet (12.5 mg total) by mouth 3 (three) times daily as needed for Dizziness. 18  Yes Justine Muñoz MD   melatonin 1 mg Tab Take by mouth.   Yes Historical Provider   naproxen (NAPROSYN) 500 MG tablet naproxen 500 mg tablet   Yes Historical Provider   rizatriptan (MAXALT) 10 MG tablet TAKE 1 TABLET BY MOUTH ONCE AS NEEDED FOR MIGRAINE. 22  Yes Daiana Esqueda MD        Allergies:  Review of patient's allergies indicates:   Allergen Reactions    No known allergies         Social History:   Social History     Socioeconomic History    Marital status:    Tobacco Use    Smoking status: Never    Smokeless tobacco: Never   Substance and Sexual Activity    Alcohol use: Yes     Comment: occassionally    Drug use: No    Sexual activity: Not Currently     Partners: Male   Other Topics Concern    Are you pregnant or think you may be? No    Breast-feeding No       Family  "History:  Family History   Problem Relation Age of Onset    Heart disease Maternal Grandfather     Diabetes Maternal Grandfather     Colon cancer Paternal Grandmother     Melanoma Paternal Grandmother     Allergies Child     Asthma Child     Cancer Neg Hx     Miscarriages / Stillbirths Neg Hx     Psoriasis Neg Hx     Lupus Neg Hx     Eczema Neg Hx     Acne Neg Hx        ROS: No acute cardiac events, no acute respiratory complaints.     Physical Exam (all patients):    BP (!) 145/77 (BP Location: Right arm, Patient Position: Sitting)   Pulse 65   Temp 97.6 °F (36.4 °C) (Temporal)   Resp 18   Ht 5' 6" (1.676 m)   Wt 97.9 kg (215 lb 13.3 oz)   SpO2 98%   BMI 34.84 kg/m²   Lungs: Clear to auscultation bilaterally, respirations unlabored  Heart: Regular rate and rhythm, S1 and S2 normal, no obvious murmurs  Abdomen:         Soft, non-tender, bowel sounds normal, no masses, no organomegaly    Lab Results   Component Value Date    WBC 5.11 02/08/2023    MCV 92 02/08/2023    RDW 12.5 02/08/2023     02/08/2023    INR 1.0 01/18/2011    GLU 95 02/08/2023    HGBA1C 5.1 02/08/2023    BUN 22 02/08/2023     02/08/2023    K 4.6 02/08/2023     02/08/2023        SEDATION PLAN: per anesthesia      History reviewed, vital signs satisfactory, cardiopulmonary status satisfactory, sedation options, risks and plans have been discussed with the patient  All their questions were answered and the patient agrees to the sedation procedures as planned and the patient is deemed an appropriate candidate for the sedation as planned.    Procedure explained to patient, informed consent obtained and placed in chart.    Kathy Stewart  6/28/2023  11:00 AM    "

## 2023-06-28 NOTE — PLAN OF CARE
Discharge instructions reviewed with patient and visitor. Handouts given & verbalized understanding with no further questions at this time. Dr Decker spoke to pt at bedside, reviewed procedure and findings, answered questions.  MD telephone number provided per AVS sheet. VSS on RA, no pain or nausea noted, tolerating po fluids, no complaints noted. Fall precautions reviewed, consents in chart, PIV removed at this time.

## 2023-06-28 NOTE — ANESTHESIA POSTPROCEDURE EVALUATION
Anesthesia Post Evaluation    Patient: Samuel Kilgore    Procedure(s) Performed: Procedure(s) (LRB):  COLONOSCOPY (N/A)    Final Anesthesia Type: general      Patient location during evaluation: PACU  Patient participation: Yes- Able to Participate  Level of consciousness: awake and alert and oriented  Post-procedure vital signs: reviewed and stable  Pain management: adequate  Airway patency: patent    PONV status at discharge: No PONV  Anesthetic complications: no      Cardiovascular status: blood pressure returned to baseline, stable and hemodynamically stable  Respiratory status: unassisted  Hydration status: euvolemic  Follow-up not needed.          Vitals Value Taken Time   /69 06/28/23 1133   Temp 36.7 °C (98 °F) 06/28/23 1117   Pulse 62 06/28/23 1136   Resp 26 06/28/23 1136   SpO2 98 % 06/28/23 1136   Vitals shown include unvalidated device data.      No case tracking events are documented in the log.      Pain/Brenda Score: Brenda Score: 10 (6/28/2023 11:27 AM)

## 2023-09-19 ENCOUNTER — TELEPHONE (OUTPATIENT)
Dept: PULMONOLOGY | Facility: CLINIC | Age: 63
End: 2023-09-19
Payer: COMMERCIAL

## 2023-09-19 DIAGNOSIS — G47.30 SLEEP-DISORDERED BREATHING: Primary | ICD-10-CM

## 2023-09-22 ENCOUNTER — HOSPITAL ENCOUNTER (OUTPATIENT)
Dept: RADIOLOGY | Facility: HOSPITAL | Age: 63
Discharge: HOME | End: 2023-09-22
Attending: INTERNAL MEDICINE
Payer: COMMERCIAL

## 2023-09-22 ENCOUNTER — OFFICE VISIT (OUTPATIENT)
Dept: PULMONOLOGY | Facility: CLINIC | Age: 63
End: 2023-09-22
Payer: COMMERCIAL

## 2023-09-22 VITALS
OXYGEN SATURATION: 97 % | HEART RATE: 81 BPM | SYSTOLIC BLOOD PRESSURE: 132 MMHG | RESPIRATION RATE: 18 BRPM | WEIGHT: 215.81 LBS | HEIGHT: 66 IN | BODY MASS INDEX: 34.68 KG/M2 | DIASTOLIC BLOOD PRESSURE: 82 MMHG

## 2023-09-22 DIAGNOSIS — G47.30 SLEEP-DISORDERED BREATHING: ICD-10-CM

## 2023-09-22 DIAGNOSIS — G47.33 OBSTRUCTIVE SLEEP APNEA SYNDROME: Primary | ICD-10-CM

## 2023-09-22 PROCEDURE — 3079F DIAST BP 80-89 MM HG: CPT | Mod: CPTII,S$GLB,, | Performed by: INTERNAL MEDICINE

## 2023-09-22 PROCEDURE — 1159F PR MEDICATION LIST DOCUMENTED IN MEDICAL RECORD: ICD-10-PCS | Mod: CPTII,S$GLB,, | Performed by: INTERNAL MEDICINE

## 2023-09-22 PROCEDURE — 3044F HG A1C LEVEL LT 7.0%: CPT | Mod: CPTII,S$GLB,, | Performed by: INTERNAL MEDICINE

## 2023-09-22 PROCEDURE — 3079F PR MOST RECENT DIASTOLIC BLOOD PRESSURE 80-89 MM HG: ICD-10-PCS | Mod: CPTII,S$GLB,, | Performed by: INTERNAL MEDICINE

## 2023-09-22 PROCEDURE — 3075F SYST BP GE 130 - 139MM HG: CPT | Mod: CPTII,S$GLB,, | Performed by: INTERNAL MEDICINE

## 2023-09-22 PROCEDURE — 3008F BODY MASS INDEX DOCD: CPT | Mod: CPTII,S$GLB,, | Performed by: INTERNAL MEDICINE

## 2023-09-22 PROCEDURE — 3044F PR MOST RECENT HEMOGLOBIN A1C LEVEL <7.0%: ICD-10-PCS | Mod: CPTII,S$GLB,, | Performed by: INTERNAL MEDICINE

## 2023-09-22 PROCEDURE — 99999 PR PBB SHADOW E&M-EST. PATIENT-LVL IV: CPT | Mod: PBBFAC,,, | Performed by: INTERNAL MEDICINE

## 2023-09-22 PROCEDURE — 1159F MED LIST DOCD IN RCRD: CPT | Mod: CPTII,S$GLB,, | Performed by: INTERNAL MEDICINE

## 2023-09-22 PROCEDURE — 71046 X-RAY EXAM CHEST 2 VIEWS: CPT | Mod: TC

## 2023-09-22 PROCEDURE — 1160F PR REVIEW ALL MEDS BY PRESCRIBER/CLIN PHARMACIST DOCUMENTED: ICD-10-PCS | Mod: CPTII,S$GLB,, | Performed by: INTERNAL MEDICINE

## 2023-09-22 PROCEDURE — 99204 OFFICE O/P NEW MOD 45 MIN: CPT | Mod: S$GLB,,, | Performed by: INTERNAL MEDICINE

## 2023-09-22 PROCEDURE — 1160F RVW MEDS BY RX/DR IN RCRD: CPT | Mod: CPTII,S$GLB,, | Performed by: INTERNAL MEDICINE

## 2023-09-22 PROCEDURE — 3075F PR MOST RECENT SYSTOLIC BLOOD PRESS GE 130-139MM HG: ICD-10-PCS | Mod: CPTII,S$GLB,, | Performed by: INTERNAL MEDICINE

## 2023-09-22 PROCEDURE — 71046 X-RAY EXAM CHEST 2 VIEWS: CPT | Mod: 26,,, | Performed by: RADIOLOGY

## 2023-09-22 PROCEDURE — 3008F PR BODY MASS INDEX (BMI) DOCUMENTED: ICD-10-PCS | Mod: CPTII,S$GLB,, | Performed by: INTERNAL MEDICINE

## 2023-09-22 PROCEDURE — 99999 PR PBB SHADOW E&M-EST. PATIENT-LVL IV: ICD-10-PCS | Mod: PBBFAC,,, | Performed by: INTERNAL MEDICINE

## 2023-09-22 PROCEDURE — 71046 XR CHEST PA AND LATERAL: ICD-10-PCS | Mod: 26,,, | Performed by: RADIOLOGY

## 2023-09-22 PROCEDURE — 99204 PR OFFICE/OUTPT VISIT, NEW, LEVL IV, 45-59 MIN: ICD-10-PCS | Mod: S$GLB,,, | Performed by: INTERNAL MEDICINE

## 2023-09-22 NOTE — PATIENT INSTRUCTIONS
Your provider has scheduled you for a sleep study.   You should be receiving a phone call from the sleep lab shortly after your study has been approved by your insurance. Please make sure you have your [facility]. If you do not hear from anyone in the next 10 business days, please call the sleep lab at [phone] to schedule your sleep study. The sleep studies are performed at [facility] seven nights a week.  When you are scheduling your sleep study, they will also make you a follow up appointment with your provider. This follow up appointment will be 10-14 days after your sleep study to review the results. If it is noted that you do have sleep apnea on your initial sleep study, you may receive a call back for a second night study with the CPAP before you come back to the office.

## 2023-09-22 NOTE — PROGRESS NOTES
Pulmonary Outpatient  Visit     Subjective:       Patient ID: Samuel Kilgore is a 62 y.o. female.    Social History     Tobacco Use   Smoking Status Never   Smokeless Tobacco Never            Chief Complaint: Apnea      Samuel Kilgore is 62 y.o.  New to me   RAYMOND  Diagnoses over 10 years.    Sleep studies were done at another institution results not available to me   Has recalled [employer] device   Has not received a new machine.    Bedtime 12 midnight and wake-up time of 7:00 a.m..    [place] sleepiness score 3   Retired   Apparently using device and lead benefits   Needs new supplies.    Device also needs to be replaced.    Uses a nasal mask but however during allergy season unable to use a nasal mask and would like a fullface mask     History of breast cancer.    Also history of some spine issues in the past seen neuro surgery.    Chest x-ray today reviewed shows some slight scoliosis which patient was not aware about   I have explained to her that she is free to follow up with Neurosurgery if symptomatic            Review of Systems   Constitutional:  Negative for fever, chills, activity change, appetite change and fatigue.   HENT:  Negative for postnasal drip, rhinorrhea, sinus pressure, trouble swallowing, voice change, congestion and hearing loss.    Respiratory:  Negative for apnea, cough, hemoptysis, sputum production, choking, chest tightness, shortness of breath, wheezing, orthopnea, previous hospitialization due to pulmonary problems, asthma nighttime symptoms and use of rescue inhaler.    Cardiovascular:  Negative for chest pain, palpitations and leg swelling.   Genitourinary:  Negative for difficulty urinating and hematuria.   Endocrine:  Negative for cold intolerance and heat intolerance.    Musculoskeletal:  Negative for back pain, gait problem and joint swelling.   Skin:  Negative for rash.   Gastrointestinal:  Negative for abdominal pain and  abdominal distention.   Neurological:  Negative for dizziness, weakness and headaches.   Hematological:  Negative for adenopathy.   Psychiatric/Behavioral:  Negative for sleep disturbance. The patient is not nervous/anxious.        Outpatient Encounter Medications as of 2023   Medication Sig Dispense Refill    meclizine (ANTIVERT) 12.5 mg tablet Take 1 tablet (12.5 mg total) by mouth 3 (three) times daily as needed for Dizziness. 30 tablet 1    melatonin 1 mg Tab Take by mouth.      naproxen (NAPROSYN) 500 MG tablet naproxen 500 mg tablet      rizatriptan (MAXALT) 10 MG tablet TAKE 1 TABLET BY MOUTH ONCE AS NEEDED FOR MIGRAINE. 12 tablet 4     Facility-Administered Encounter Medications as of 2023   Medication Dose Route Frequency Provider Last Rate Last Admin    lactated ringers infusion   Intravenous Continuous Kathy Stewart MD   New Bag at 23 1059       The following portions of the patient's history were reviewed and updated as appropriate: She  has a past medical history of Anxiety, Breast cancer (), Encounter for blood transfusion, Lumbar spondylosis, Migraine headache, and Mild vitamin D deficiency.  She does not have any pertinent problems on file.  She  has a past surgical history that includes  section; Breast reconstruction (Bilateral, ); Mastectomy (Bilateral, ); and Colonoscopy (N/A, 2023).  Her family history includes Allergies in her child; Asthma in her child; Colon cancer in her paternal grandmother; Diabetes in her maternal grandfather; Heart disease in her maternal grandfather; Melanoma in her paternal grandmother.  She  reports that she has never smoked. She has never used smokeless tobacco. She reports current alcohol use. She reports that she does not use drugs.  She has a current medication list which includes the following prescription(s): meclizine, melatonin, naproxen, and rizatriptan, and the following Facility-Administered Medications:  lactated ringers.  Current Outpatient Medications on File Prior to Visit   Medication Sig Dispense Refill    meclizine (ANTIVERT) 12.5 mg tablet Take 1 tablet (12.5 mg total) by mouth 3 (three) times daily as needed for Dizziness. 30 tablet 1    melatonin 1 mg Tab Take by mouth.      naproxen (NAPROSYN) 500 MG tablet naproxen 500 mg tablet      rizatriptan (MAXALT) 10 MG tablet TAKE 1 TABLET BY MOUTH ONCE AS NEEDED FOR MIGRAINE. 12 tablet 4     Current Facility-Administered Medications on File Prior to Visit   Medication Dose Route Frequency Provider Last Rate Last Admin    lactated ringers infusion   Intravenous Continuous Kathy Stewart MD   New Bag at 06/28/23 1059     She is allergic to no known allergies..      BP Readings from Last 3 Encounters:   09/22/23 132/82   06/28/23 119/69   01/30/23 124/78     Snoring / Sleep:     [place] Questionnaire (validated RAYMOND screening questionnaire)    YES -- Snoring/apnea    YES -- Fatigue    Body mass index is 34.84 kg/m².  (>25 is overweight, >30 is obese)    Blood Pressure = Hypertension  (PreHTN 120-139/80-89, Stg1 140-159/90-99, Stg2 >160/>100)  [place] = 3 of three RAYMOND categories are positive (high risk is 2-3 positive categories)     [place] Sleepiness Scale TOTAL =          9/22/2023    12:09 PM   EPWORTH SLEEPINESS SCALE   Sitting and reading 0   Watching TV 0   Sitting, inactive in a public place (e.g. a theatre or a meeting) 0   As a passenger in a car for an hour without a break 0   Lying down to rest in the afternoon when circumstances permit 0   Sitting and talking to someone 0   Sitting quietly after a lunch without alcohol 3   In a car, while stopped for a few minutes in traffic 0   Total score 3      (validated sleepiness questionnaire with a higher score indicating greater sleepiness; range 0-24)  No flowsheet data found.    STOP-Bang Questionnaire (validated RAYMOND screening questionnaire)  Negative unless checked off.  [x] Snoring    [x]  " Tired/Fatigued/Sleepy  [x] Obstruction (apneas/choking)  [x] Pressure (HTN)  [] BMI >35  [x] Age >50  [] Neck >40 cm  [] Gender male   STOP-Bang = 5 (low risk 0-2,high risk 3-8)    Neck circumference 19" cm [?RAYMOND risk if >43cm (17in) male or >41cm (15.5 in) female]         MMRC Dyspnea Scale (4 is worst)     [x] MMRC 0: Dyspneic on strenuous excercise (0 points)    [] MMRC 1: Dyspneic on walking a slight hill (0 points)    [] MMRC 2: Dyspneic on walking level ground; must stop occasionally due to breathlessness (1 point)    [] MMRC 3: Must stop for breathlessness after walking 100 yards or after a few minutes (2 points)    [] MMRC 4: Cannot leave house; breathless on dressing/undressing (3 points)                          No data to display                          Objective:     Vital Signs (Most Recent)  Vital Signs  Pulse: 81  Resp: 18  SpO2: 97 %  BP: 132/82  Height and Weight  Height: 5' 6" (167.6 cm)  Weight: 97.9 kg (215 lb 13.3 oz)  BSA (Calculated - sq m): 2.14 sq meters  BMI (Calculated): 34.9  Weight in (lb) to have BMI = 25: 154.6]  Wt Readings from Last 2 Encounters:   09/22/23 97.9 kg (215 lb 13.3 oz)   06/28/23 97.9 kg (215 lb 13.3 oz)       Physical Exam  Vitals and nursing note reviewed.   Constitutional:       Appearance: She is obese.      Comments: Neck 19"   HENT:      Head: Normocephalic and atraumatic.      Nose: Nose normal.   Eyes:      Pupils: Pupils are equal, round, and reactive to light.   Cardiovascular:      Rate and Rhythm: Normal rate and regular rhythm.      Pulses: Normal pulses.      Heart sounds: Normal heart sounds.   Pulmonary:      Effort: Pulmonary effort is normal.      Breath sounds: Normal breath sounds.   Abdominal:      General: Bowel sounds are normal.      Palpations: Abdomen is soft.   Musculoskeletal:      Cervical back: Normal range of motion.   Skin:     General: Skin is warm.   Neurological:      General: No focal deficit present.      Mental Status: She is alert " "and oriented to person, place, and time.   Psychiatric:         Mood and Affect: Mood normal.        Laboratory  Lab Results   Component Value Date    WBC 5.11 02/08/2023    RBC 4.29 02/08/2023    HGB 13.1 02/08/2023    HCT 39.4 02/08/2023    MCV 92 02/08/2023    MCH 30.5 02/08/2023    MCHC 33.2 02/08/2023    RDW 12.5 02/08/2023     02/08/2023    MPV 9.9 02/08/2023    GRAN 2.5 02/08/2023    GRAN 48.2 02/08/2023    LYMPH 2.0 02/08/2023    LYMPH 39.5 02/08/2023    MONO 0.5 02/08/2023    MONO 8.8 02/08/2023    EOS 0.1 02/08/2023    BASO 0.03 02/08/2023    EOSINOPHIL 2.7 02/08/2023    BASOPHIL 0.6 02/08/2023       BMP  Lab Results   Component Value Date     02/08/2023    K 4.6 02/08/2023     02/08/2023    CO2 27 02/08/2023    BUN 22 02/08/2023    CREATININE 0.8 02/08/2023    CALCIUM 9.9 02/08/2023    ANIONGAP 7 (L) 02/08/2023    ESTGFRAFRICA >60.0 10/19/2021    EGFRNONAA >60.0 10/19/2021    AST 19 02/08/2023    ALT 28 02/08/2023    PROT 7.0 02/08/2023          Lab Results   Component Value Date    IGE <35 11/18/2014        Lab Results   Component Value Date    ASPERGILLUS <0.35 11/18/2014     Lab Results   Component Value Date    AFUMIGATUSCL CLASS 0 11/18/2014        No results found for: "ACE"     Diagnostic Results:  I have personally reviewed today the following studies:    X-Ray Chest PA And Lateral  Narrative: EXAMINATION:  XR CHEST PA AND LATERAL    CLINICAL HISTORY:  Sleep apnea, unspecified    TECHNIQUE:  PA and lateral views of the chest were performed.    COMPARISON:  Prior radiographs    FINDINGS:  Cardiac silhouette and mediastinal contours are normal.  Lungs demonstrate minimal basilar scarring.  No pleural effusion.  Osseous structures are intact.  Impression: No acute cardiopulmonary process.    Electronically signed by: Olayinka Bolivar MD  Date:    09/22/2023  Time:    12:07       Assessment/Plan:     Problem List Items Addressed This Visit       Obstructive sleep apnea syndrome - " Primary    Relevant Orders    Polysomnogram (CPAP will be added if patient meets diagnostic criteria.)    CPAP/BIPAP SUPPLIES      My recommendation at this point would be to set up a HOME SLEEP TEST or INLAB POLYSOMNOGRAPHY  through the [facility] ( [phone].    We have discussed weight loss , non supine position, good sleep hygiene, and  other interventions to foster good natural healthy sleep.  We have discussed behavioral modifications, as well.  After her study, she  could certainly try PAP therapy or out suitable alternatives        Follow up in about 4 weeks (around 10/20/2023), or PSG, weight loss,.    This note was prepared using voice recognition system and is likely to have sound alike errors that may have been overlooked even after proof reading.  Please call me with any questions    Discussed diagnosis, its evaluation, treatment and usual course. All questions answered.    Thank you for the courtesy of participating in the care of this patient    Madan Giraldo MD      Personal Diagnostic Review  []  CXR    []  ECHO    []  ONSAT    []  6MWD    []  LABS    []  CHEST CT    []  PET CT    []  Biopsy results

## 2023-10-05 DIAGNOSIS — G47.33 OSA (OBSTRUCTIVE SLEEP APNEA): Primary | ICD-10-CM

## 2023-10-17 ENCOUNTER — HOSPITAL ENCOUNTER (OUTPATIENT)
Dept: SLEEP MEDICINE | Facility: HOSPITAL | Age: 63
Discharge: HOME | End: 2023-10-17
Attending: INTERNAL MEDICINE
Payer: COMMERCIAL

## 2023-10-17 DIAGNOSIS — G47.33 OSA (OBSTRUCTIVE SLEEP APNEA): ICD-10-CM

## 2023-10-17 PROCEDURE — 95806 SLEEP STUDY UNATT&RESP EFFT: CPT | Performed by: INTERNAL MEDICINE

## 2023-10-17 NOTE — Clinical Note
PHYSICIAN INTERPRETATION AND COMMENTS: Findings are consistent with moderate, positional obstructive sleep apnea(RAYMOND). Therapy with CPAP indicated CLINICAL HISTORY: 62 year old female presented with: 15.25 inch neck, BMI of 34.9, an [place] sleepiness score of 7, history of a previous diagnosis of RAYMOND and symptoms of nocturnal snoring and witnessed apneas. Based on the clinical history, the patient has a high pre-test probability of having Moderate RAYMOND.

## 2023-10-18 PROCEDURE — 95800 PR SLEEP STUDY, UNATTENDED, RECORD HEART RATE/O2 SAT/RESP ANAL/SLEEP TIME: ICD-10-PCS | Mod: 26,,, | Performed by: INTERNAL MEDICINE

## 2023-10-18 PROCEDURE — 95800 SLP STDY UNATTENDED: CPT | Mod: 26,,, | Performed by: INTERNAL MEDICINE

## 2023-10-18 NOTE — PROCEDURES
PHYSICIAN INTERPRETATION AND COMMENTS: Findings are consistent with moderate, positional obstructive sleep  apnea(RAYMOND). Therapy with CPAP indicated  CLINICAL HISTORY: 62 year old female presented with: 15.25 inch neck, BMI of 34.9, an [place] sleepiness score of 7,  history of a previous diagnosis of RAYMOND and symptoms of nocturnal snoring and witnessed apneas. Based on the clinical  history, the patient has a high pre-test probability of having Moderate RAYMOND.  SLEEP STUDY FINDINGS: Patient underwent a 1 night Home Sleep Test and by behavioral criteria, slept for approximately  6.16 hours, with a sleep latency of 6 minutes and a sleep efficiency of 89%. Moderate sleep disordered breathing (AHI=22)  is noted based on a 4% hypopnea desaturation criteria. The patient slept supine 47.9% of the night based on valid  recording time of 5.87 hours and is 1.3 times as likely to have apneas/hypopneas when supine. When considering more  subtle measures of sleep disordered breathing, the overall respiratory disturbance index is moderate(RDI=36) based on a  1% hypopnea desaturation criteria with confirmation by surrogate arousal indicators, predominantly in the supine  position (47 events/hour). The apneas/hypopneas are accompanied by mild oxygen desaturation (percent time below 90%  SpO2: 6%, Min SpO2: 81.2%). The average desaturation across all sleep disordered breathing events is 4.3%. Snoring occurs  for 0.4% (30 dB) of the study. The mean pulse rate is 56.4 BPM, with very frequent pulse rate variability (62 events with >= 6  BPM increase/decrease per hour).  TREATMENT CONSIDERATIONS: Consider nasal continuous positive airway pressure (CPAP/AutoPAP) as the initial  treatment choice based on the AHI severity and co-morbidities. A mandibular advancement splint (MAS) or referral to an  ENT surgeon for modification to the airway should be considered to reduce the potential contribution of RAYMOND on existing  diseases if the patient  "prefers an alternative therapy or the CPAP trial is unsuccessful. Based on the RAYMOND Supine data in the  study, a Mandibular Advancement Splint (MAS) will likely provide treatment benefit independent of RAYMOND severity. The  patient should avoid sleeping supine; the non-supine RDI is 1.8 times less severe than the supine RDI.  DISEASE MANAGEMENT CONSIDERATIONS: Insomnia is a symptom that can be associated with untreated RAYMOND.      Dear Madan Giraldo MD  [address] [facility]  [place] [address]/No, Primary Doctor         The sleep study that you ordered is complete.  You have ordered sleep LAB services to perform the sleep study for Samuel Kilgore .      Please find Sleep Study result in  the "Media tab" of Chart Review menu.        You can look  for the report in the  Media by the document type "Sleep Study Documents". Alphabetizing  "Document type" column helps to find the SLEEP STUDY report  Faster.       As the ordering provider, you are responsible for reviewing the results and implementing a treatment plan with your patient.    If you need a Sleep Medicine provider to explain the sleep study findings and arrange treatment for the patient, please refer patient for consultation to our Sleep Clinic via [facility] with Ambulatory Consult Sleep.     To do that please place an order for an  "Ambulatory Consult Sleep" -  order , it will go to our clinic work queue for our staff  to contact the patient for an appointment.      For any questions, please contact our sleep lab  staff at [phone] to talk to clinical staff          Madan Giraldo MD   "

## 2023-10-24 ENCOUNTER — TELEPHONE (OUTPATIENT)
Dept: PULMONOLOGY | Facility: CLINIC | Age: 63
End: 2023-10-24
Payer: COMMERCIAL

## 2023-10-24 NOTE — TELEPHONE ENCOUNTER
----- Message from Madan Giraldo MD sent at 10/18/2023  1:19 PM CDT -----    Please let the patient know that sleep study has been completed.  Please send a copy of the report to the patient.  Please arrange an appointment for patient to follow up either with me or the nurse practitioner ( LEJEUNE or MICK)  in the [facility] to review the results and initiate treatment.

## 2023-10-26 ENCOUNTER — TELEPHONE (OUTPATIENT)
Dept: PULMONOLOGY | Facility: CLINIC | Age: 63
End: 2023-10-26
Payer: COMMERCIAL

## 2023-10-26 NOTE — TELEPHONE ENCOUNTER
----- Message from Nesha Belle sent at 10/26/2023 11:46 AM CDT -----  Type:  Patient Returning Call    Who Called:pt  Who Left Message for Patient:  Does the patient know what this is regarding?:missed call  Would the patient rather a call back or a response via [facility]? call  Best Call Back Number:[phone]  Additional Information:

## 2024-01-05 ENCOUNTER — OFFICE VISIT (OUTPATIENT)
Dept: PULMONOLOGY | Facility: CLINIC | Age: 64
End: 2024-01-05
Payer: COMMERCIAL

## 2024-01-05 VITALS
HEIGHT: 66 IN | WEIGHT: 224.44 LBS | DIASTOLIC BLOOD PRESSURE: 80 MMHG | RESPIRATION RATE: 17 BRPM | HEART RATE: 79 BPM | BODY MASS INDEX: 36.07 KG/M2 | SYSTOLIC BLOOD PRESSURE: 130 MMHG | OXYGEN SATURATION: 98 %

## 2024-01-05 DIAGNOSIS — G25.81 RESTLESS LEGS: ICD-10-CM

## 2024-01-05 DIAGNOSIS — G47.33 OSA (OBSTRUCTIVE SLEEP APNEA): Primary | ICD-10-CM

## 2024-01-05 PROCEDURE — 1160F RVW MEDS BY RX/DR IN RCRD: CPT | Mod: CPTII,S$GLB,, | Performed by: INTERNAL MEDICINE

## 2024-01-05 PROCEDURE — 3075F SYST BP GE 130 - 139MM HG: CPT | Mod: CPTII,S$GLB,, | Performed by: INTERNAL MEDICINE

## 2024-01-05 PROCEDURE — 3079F DIAST BP 80-89 MM HG: CPT | Mod: CPTII,S$GLB,, | Performed by: INTERNAL MEDICINE

## 2024-01-05 PROCEDURE — 99214 OFFICE O/P EST MOD 30 MIN: CPT | Mod: S$GLB,,, | Performed by: INTERNAL MEDICINE

## 2024-01-05 PROCEDURE — 3008F BODY MASS INDEX DOCD: CPT | Mod: CPTII,S$GLB,, | Performed by: INTERNAL MEDICINE

## 2024-01-05 PROCEDURE — 1159F MED LIST DOCD IN RCRD: CPT | Mod: CPTII,S$GLB,, | Performed by: INTERNAL MEDICINE

## 2024-01-05 PROCEDURE — 99999 PR PBB SHADOW E&M-EST. PATIENT-LVL IV: CPT | Mod: PBBFAC,,, | Performed by: INTERNAL MEDICINE

## 2024-01-05 NOTE — PROGRESS NOTES
Pulmonary Outpatient  Visit     Subjective:       Patient ID: Samuel Kilgore is a 63 y.o. female.    Social History     Tobacco Use   Smoking Status Never   Smokeless Tobacco Never            Chief Complaint: Sleep Apnea      Samuel Kilgore is 62 y.o.  New to me   RAYMOND  Diagnoses over 10 years.    Sleep studies were done at another institution results not available to me   Has recalled [employer] device   Has not received a new machine.    Bedtime 12 midnight and wake-up time of 7:00 a.m..    Bly sleepiness score 3   Retired   Apparently using device and lead benefits   Needs new supplies.    Device also needs to be replaced.    Uses a nasal mask but however during allergy season unable to use a nasal mask and would like a fullface mask     History of breast cancer.    Also history of some spine issues in the past seen neuro surgery.    Chest x-ray today reviewed shows some slight scoliosis which patient was not aware about   I have explained to her that she is free to follow up with Neurosurgery if symptomatic      01/05/2024  Followup   Reveiwed sleep study  AHI 22.0 and RDI 36.0  Device works well   Wants larger mask  Has some ear issues right side  Await ENT  Download reviewed                Review of Systems   Constitutional:  Negative for fever, chills, activity change, appetite change and fatigue.   HENT:  Negative for postnasal drip, rhinorrhea, sinus pressure, trouble swallowing, voice change, congestion and hearing loss.    Respiratory:  Negative for apnea, cough, hemoptysis, sputum production, choking, chest tightness, shortness of breath, wheezing, orthopnea, previous hospitialization due to pulmonary problems, asthma nighttime symptoms and use of rescue inhaler.    Cardiovascular:  Negative for chest pain, palpitations and leg swelling.   Genitourinary:  Negative for difficulty urinating and hematuria.   Endocrine:  Negative for cold  intolerance and heat intolerance.    Musculoskeletal:  Negative for back pain, gait problem and joint swelling.   Skin:  Negative for rash.   Gastrointestinal:  Negative for abdominal pain and abdominal distention.   Neurological:  Negative for dizziness, weakness and headaches.   Hematological:  Negative for adenopathy.   Psychiatric/Behavioral:  Negative for sleep disturbance. The patient is not nervous/anxious.        Outpatient Encounter Medications as of 2024   Medication Sig Dispense Refill    meclizine (ANTIVERT) 12.5 mg tablet Take 1 tablet (12.5 mg total) by mouth 3 (three) times daily as needed for Dizziness. 30 tablet 1    melatonin 1 mg Tab Take by mouth.      naproxen (NAPROSYN) 500 MG tablet naproxen 500 mg tablet      rizatriptan (MAXALT) 10 MG tablet TAKE 1 TABLET BY MOUTH ONCE AS NEEDED FOR MIGRAINE. 12 tablet 4     Facility-Administered Encounter Medications as of 2024   Medication Dose Route Frequency Provider Last Rate Last Admin    lactated ringers infusion   Intravenous Continuous Kathy Stewart MD   New Bag at 23 1059       The following portions of the patient's history were reviewed and updated as appropriate: She  has a past medical history of Anxiety, Breast cancer (), Encounter for blood transfusion, Lumbar spondylosis, Migraine headache, and Mild vitamin D deficiency.  She does not have any pertinent problems on file.  She  has a past surgical history that includes  section; Breast reconstruction (Bilateral, ); Mastectomy (Bilateral, ); and Colonoscopy (N/A, 2023).  Her family history includes Allergies in her child; Asthma in her child; Colon cancer in her paternal grandmother; Diabetes in her maternal grandfather; Heart disease in her maternal grandfather; Melanoma in her paternal grandmother.  She  reports that she has never smoked. She has never used smokeless tobacco. She reports current alcohol use. She reports that she does not use  "drugs.  She has a current medication list which includes the following prescription(s): meclizine, melatonin, naproxen, and rizatriptan, and the following Facility-Administered Medications: lactated ringers.  Current Outpatient Medications on File Prior to Visit   Medication Sig Dispense Refill    meclizine (ANTIVERT) 12.5 mg tablet Take 1 tablet (12.5 mg total) by mouth 3 (three) times daily as needed for Dizziness. 30 tablet 1    melatonin 1 mg Tab Take by mouth.      naproxen (NAPROSYN) 500 MG tablet naproxen 500 mg tablet      rizatriptan (MAXALT) 10 MG tablet TAKE 1 TABLET BY MOUTH ONCE AS NEEDED FOR MIGRAINE. 12 tablet 4     Current Facility-Administered Medications on File Prior to Visit   Medication Dose Route Frequency Provider Last Rate Last Admin    lactated ringers infusion   Intravenous Continuous Kathy Stewart MD   New Bag at 06/28/23 1059     She is allergic to no known allergies..      BP Readings from Last 3 Encounters:   01/05/24 130/80   09/22/23 132/82   06/28/23 119/69            MMRC Dyspnea Scale (4 is worst)     [x] MMRC 0: Dyspneic on strenuous excercise (0 points)    [] MMRC 1: Dyspneic on walking a slight hill (0 points)    [] MMRC 2: Dyspneic on walking level ground; must stop occasionally due to breathlessness (1 point)    [] MMRC 3: Must stop for breathlessness after walking 100 yards or after a few minutes (2 points)    [] MMRC 4: Cannot leave house; breathless on dressing/undressing (3 points)                          No data to display                          Objective:     Vital Signs (Most Recent)  Vital Signs  Pulse: 79  Resp: 17  SpO2: 98 %  BP: 130/80  Height and Weight  Height: 5' 6" (167.6 cm)  Weight: 101.8 kg (224 lb 6.9 oz)  BSA (Calculated - sq m): 2.18 sq meters  BMI (Calculated): 36.2  Weight in (lb) to have BMI = 25: 154.6]  Wt Readings from Last 2 Encounters:   01/05/24 101.8 kg (224 lb 6.9 oz)   09/22/23 97.9 kg (215 lb 13.3 oz)       Physical Exam  Vitals and " "nursing note reviewed.   Constitutional:       Appearance: She is obese.      Comments: Neck 19"   HENT:      Head: Normocephalic and atraumatic.      Nose: Nose normal.   Eyes:      Pupils: Pupils are equal, round, and reactive to light.   Cardiovascular:      Rate and Rhythm: Normal rate and regular rhythm.      Pulses: Normal pulses.      Heart sounds: Normal heart sounds.   Pulmonary:      Effort: Pulmonary effort is normal.      Breath sounds: Normal breath sounds.   Abdominal:      General: Bowel sounds are normal.      Palpations: Abdomen is soft.   Musculoskeletal:      Cervical back: Normal range of motion.   Skin:     General: Skin is warm.   Neurological:      General: No focal deficit present.      Mental Status: She is alert and oriented to person, place, and time.   Psychiatric:         Mood and Affect: Mood normal.          Laboratory  Lab Results   Component Value Date    WBC 5.11 02/08/2023    RBC 4.29 02/08/2023    HGB 13.1 02/08/2023    HCT 39.4 02/08/2023    MCV 92 02/08/2023    MCH 30.5 02/08/2023    MCHC 33.2 02/08/2023    RDW 12.5 02/08/2023     02/08/2023    MPV 9.9 02/08/2023    GRAN 2.5 02/08/2023    GRAN 48.2 02/08/2023    LYMPH 2.0 02/08/2023    LYMPH 39.5 02/08/2023    MONO 0.5 02/08/2023    MONO 8.8 02/08/2023    EOS 0.1 02/08/2023    BASO 0.03 02/08/2023    EOSINOPHIL 2.7 02/08/2023    BASOPHIL 0.6 02/08/2023       BMP  Lab Results   Component Value Date     02/08/2023    K 4.6 02/08/2023     02/08/2023    CO2 27 02/08/2023    BUN 22 02/08/2023    CREATININE 0.8 02/08/2023    CALCIUM 9.9 02/08/2023    ANIONGAP 7 (L) 02/08/2023    ESTGFRAFRICA >60.0 10/19/2021    EGFRNONAA >60.0 10/19/2021    AST 19 02/08/2023    ALT 28 02/08/2023    PROT 7.0 02/08/2023          Lab Results   Component Value Date    IGE <35 11/18/2014        Lab Results   Component Value Date    ASPERGILLUS <0.35 11/18/2014     Lab Results   Component Value Date    AFUMIGATUSCL CLASS 0 11/18/2014    " "    No results found for: "ACE"     Diagnostic Results:  I have personally reviewed today the following studies:    X-Ray Chest PA And Lateral  Narrative: EXAMINATION:  XR CHEST PA AND LATERAL    CLINICAL HISTORY:  Sleep apnea, unspecified    TECHNIQUE:  PA and lateral views of the chest were performed.    COMPARISON:  Prior radiographs    FINDINGS:  Cardiac silhouette and mediastinal contours are normal.  Lungs demonstrate minimal basilar scarring.  No pleural effusion.  Osseous structures are intact.  Impression: No acute cardiopulmonary process.    Electronically signed by: Olayinka Bolivar MD  Date:    09/22/2023  Time:    12:07     PHYSICIAN INTERPRETATION AND COMMENTS: Findings are consistent with moderate, positional obstructive sleep  apnea(RAYMOND). Therapy with CPAP indicated  CLINICAL HISTORY: 62 year old female presented with: 15.25 inch neck, BMI of 34.9, an [place] sleepiness score of 7,  history of a previous diagnosis of RAYMOND and symptoms of nocturnal snoring and witnessed apneas. Based on the clinical  history, the patient has a high pre-test probability of having Moderate RAYMOND.  SLEEP STUDY FINDINGS: Patient underwent a 1 night Home Sleep Test and by behavioral criteria, slept for approximately  6.16 hours, with a sleep latency of 6 minutes and a sleep efficiency of 89%. Moderate sleep disordered breathing (AHI=22)  is noted based on a 4% hypopnea desaturation criteria. The patient slept supine 47.9% of the night based on valid  recording time of 5.87 hours and is 1.3 times as likely to have apneas/hypopneas when supine. When considering more  subtle measures of sleep disordered breathing, the overall respiratory disturbance index is moderate(RDI=36) based on a  1% hypopnea desaturation criteria with confirmation by surrogate arousal indicators, predominantly in the supine  position (47 events/hour). The apneas/hypopneas are accompanied by mild oxygen desaturation (percent time below 90%  SpO2: 6%, Min " SpO2: 81.2%). The average desaturation across all sleep disordered breathing events is 4.3%. Snoring occurs  for 0.4% (30 dB) of the study. The mean pulse rate is 56.4 BPM, with very frequent pulse rate variability (62 events with >= 6  BPM increase/decrease per hour).  TREATMENT CONSIDERATIONS: Consider nasal continuous positive airway pressure (CPAP/AutoPAP) as the initial  treatment choice based on the AHI severity and co-morbidities. A mandibular advancement splint (MAS) or referral to an  ENT surgeon for modification to the airway should be considered to reduce the potential contribution of RAYMOND on existing  diseases if the patient prefers an alternative therapy or the CPAP trial is unsuccessful. Based on the RAYMOND Supine data in the  study, a Mandibular Advancement Splint (MAS) will likely provide treatment benefit independent of RAYMOND severity. The  patient should avoid sleeping supine; the non-supine RDI is 1.8 times less severe than the supine RDI.  DISEASE MANAGEMENT CONSIDERATIONS: Insomnia is a symptom that can be associated with untreated RAYMOND.      Compliance Information 12/7/2023 - 1/5/2024  Compliance Summary  12/7/2023 - 1/5/2024 (30 days)  Days with Device Usage 30 days  Days without Device Usage 0 days  Percent Days with Device Usage 100.0%  Cumulative Usage 10 days 7 hrs. 57 mins. 5 secs.  Maximum Usage (1 Day) 12 hrs. 16 mins. 8 secs.  Average Usage (All Days) 8 hrs. 15 mins. 54 secs.  Average Usage (Days Used) 8 hrs. 15 mins. 54 secs.  Minimum Usage (1 Day) 59 secs.  Percent of Days with Usage >= 4 Hours 96.7%  Percent of Days with Usage < 4 Hours 3.3%  Date Range  Total Blower Time 10 days 8 hrs. 23 mins. 12 secs.  Average AHI 1.7  Auto-CPAP Summary  Auto-CPAP Mean Pressure 14.5 cmH2O  Auto-CPAP Peak Average Pressure 15.9 cmH2O  Device Pressure <= 90% of Time 15.9 cmH2O  Average Time in Large Leak Per Day 18 secs.  Assessment/Plan:     Problem List Items Addressed This Visit       RAYMOND (obstructive  sleep apnea) - Primary         1/5/2024    12:57 PM   EPWORTH SLEEPINESS SCALE   Sitting and reading 0   Watching TV 1   Sitting, inactive in a public place (e.g. a theatre or a meeting) 0   As a passenger in a car for an hour without a break 0   Lying down to rest in the afternoon when circumstances permit 2   Sitting and talking to someone 0   Sitting quietly after a lunch without alcohol 0   In a car, while stopped for a few minutes in traffic 0   Total score 3        Data 12/07/2023 to 01/05/2024  APAP 14-20  Usage > 4 hrs was 96.7%    AHI 1.7    USING AND BENEFITS         Restless legs     RLS score 6                    Follow up in about 1 year (around 1/5/2025), or download, needs RSV.    This note was prepared using voice recognition system and is likely to have sound alike errors that may have been overlooked even after proof reading.  Please call me with any questions    Discussed diagnosis, its evaluation, treatment and usual course. All questions answered.    Thank you for the courtesy of participating in the care of this patient    Madan Giraldo MD      Personal Diagnostic Review  []  CXR    []  ECHO    []  ONSAT    []  6MWD    []  LABS    []  CHEST CT    []  PET CT    []  Biopsy results

## 2024-01-05 NOTE — ASSESSMENT & PLAN NOTE
1/5/2024    12:57 PM   EPWORTH SLEEPINESS SCALE   Sitting and reading 0   Watching TV 1   Sitting, inactive in a public place (e.g. a theatre or a meeting) 0   As a passenger in a car for an hour without a break 0   Lying down to rest in the afternoon when circumstances permit 2   Sitting and talking to someone 0   Sitting quietly after a lunch without alcohol 0   In a car, while stopped for a few minutes in traffic 0   Total score 3        Data 12/07/2023 to 01/05/2024  APAP 14-20  Usage > 4 hrs was 96.7%    AHI 1.7    USING AND BENEFITS

## 2024-02-01 DIAGNOSIS — G43.009 MIGRAINE WITHOUT AURA AND WITHOUT STATUS MIGRAINOSUS, NOT INTRACTABLE: ICD-10-CM

## 2024-02-01 RX ORDER — RIZATRIPTAN BENZOATE 10 MG/1
TABLET ORAL
Qty: 12 TABLET | Refills: 4 | Status: SHIPPED | OUTPATIENT
Start: 2024-02-01

## 2024-02-01 NOTE — TELEPHONE ENCOUNTER
Refill Routing Note   Medication(s) are not appropriate for processing by [facility] for the following reason(s):        No PCP listed on profile; Routed to previous prescribing provider   Outside of protocol  Requirement: Established PCP participating in ORC program    [facility]C action(s):                  Appointments  past 12m or future 3m with PCP    Date Provider   Last Visit   4/21/2022 Daiana Esqueda MD   Next Visit   Visit date not found Daiana Esqueda MD   ED visits in past 90 days: 0        Note composed:2:53 PM 02/01/2024

## 2024-02-06 ENCOUNTER — OFFICE VISIT (OUTPATIENT)
Dept: OTOLARYNGOLOGY | Facility: CLINIC | Age: 64
End: 2024-02-06
Payer: COMMERCIAL

## 2024-02-06 VITALS — WEIGHT: 223.31 LBS | BODY MASS INDEX: 36.05 KG/M2

## 2024-02-06 DIAGNOSIS — J30.2 SEASONAL ALLERGIC RHINITIS, UNSPECIFIED TRIGGER: ICD-10-CM

## 2024-02-06 DIAGNOSIS — H69.93 ETD (EUSTACHIAN TUBE DYSFUNCTION), BILATERAL: Primary | ICD-10-CM

## 2024-02-06 PROCEDURE — 92567 TYMPANOMETRY: CPT | Mod: S$GLB,,, | Performed by: PHYSICIAN ASSISTANT

## 2024-02-06 PROCEDURE — 99999 PR PBB SHADOW E&M-EST. PATIENT-LVL III: CPT | Mod: PBBFAC,,, | Performed by: PHYSICIAN ASSISTANT

## 2024-02-06 PROCEDURE — 99203 OFFICE O/P NEW LOW 30 MIN: CPT | Mod: 25,S$GLB,, | Performed by: PHYSICIAN ASSISTANT

## 2024-02-06 PROCEDURE — 1159F MED LIST DOCD IN RCRD: CPT | Mod: CPTII,S$GLB,, | Performed by: PHYSICIAN ASSISTANT

## 2024-02-06 PROCEDURE — 3008F BODY MASS INDEX DOCD: CPT | Mod: CPTII,S$GLB,, | Performed by: PHYSICIAN ASSISTANT

## 2024-02-06 RX ORDER — FLUTICASONE FUROATE 27.5 UG/1
2 SPRAY, METERED NASAL DAILY
Qty: 9.1 ML | Refills: 6 | Status: SHIPPED | OUTPATIENT
Start: 2024-02-06

## 2024-02-06 NOTE — PROGRESS NOTES
Subjective:   Patient ID: Samuel Kilgore is a 63 y.o. female.    Chief Complaint: Dizziness, Ear Fullness, and Other (Patient states that she is having trouble with both ears popping and fullness. Tenderness and pain when she goes to lay down. Patient thinks ear popping is coming from using cpap machine.)    Ms. Kilgore is a very pleasant 62 yo female here to see me today with complaints of bilateral ear popping ( R>L) especially when using CPAP machine. She has a h/o GERD, seasonal allergies and migraines.       Review of patient's allergies indicates:   Allergen Reactions    No known allergies            Review of Systems   Constitutional:  Negative for chills, fatigue, fever and unexpected weight change.   HENT:  Positive for ear pain. Negative for congestion, dental problem, ear discharge, facial swelling, hearing loss, nosebleeds, postnasal drip, rhinorrhea, sinus pressure, sneezing, sore throat, tinnitus, trouble swallowing and voice change.    Eyes:  Negative for redness, itching and visual disturbance.   Respiratory:  Negative for cough, choking, shortness of breath and wheezing.    Cardiovascular:  Negative for chest pain and palpitations.   Gastrointestinal:  Negative for abdominal pain.        No reflux.   Musculoskeletal:  Negative for gait problem.   Skin:  Negative for rash.   Neurological:  Positive for headaches. Negative for dizziness and light-headedness.         Objective:   Wt 101.3 kg (223 lb 5.2 oz)   BMI 36.05 kg/m²     Physical Exam  Constitutional:       General: She is not in acute distress.     Appearance: She is well-developed.   HENT:      Head: Normocephalic and atraumatic.      Right Ear: Ear canal and external ear normal. Tympanic membrane is retracted (severe).      Left Ear: Ear canal and external ear normal. Tympanic membrane is retracted.      Nose: Nose normal. No nasal deformity, septal deviation, mucosal edema or rhinorrhea.      Right Sinus: No maxillary sinus tenderness or  frontal sinus tenderness.      Left Sinus: No maxillary sinus tenderness or frontal sinus tenderness.      Mouth/Throat:      Mouth: Mucous membranes are not pale and not dry.      Dentition: No dental caries.      Pharynx: Uvula midline. No oropharyngeal exudate or posterior oropharyngeal erythema.   Eyes:      General: Lids are normal. No scleral icterus.     Extraocular Movements:      Right eye: Normal extraocular motion and no nystagmus.      Left eye: Normal extraocular motion and no nystagmus.      Conjunctiva/sclera: Conjunctivae normal.      Right eye: Right conjunctiva is not injected. No chemosis.     Left eye: Left conjunctiva is not injected. No chemosis.     Pupils: Pupils are equal, round, and reactive to light.   Neck:      Thyroid: No thyroid mass or thyromegaly.      Trachea: Trachea and phonation normal. No tracheal tenderness or tracheal deviation.   Pulmonary:      Effort: Pulmonary effort is normal. No respiratory distress.      Breath sounds: No stridor.   Abdominal:      General: There is no distension.   Lymphadenopathy:      Head:      Right side of head: No submental, submandibular, preauricular, posterior auricular or occipital adenopathy.      Left side of head: No submental, submandibular, preauricular, posterior auricular or occipital adenopathy.      Cervical: No cervical adenopathy.   Skin:     General: Skin is warm and dry.      Findings: No erythema or rash.   Neurological:      Mental Status: She is alert and oriented to person, place, and time.      Cranial Nerves: No cranial nerve deficit.   Psychiatric:         Behavior: Behavior normal.          Imaging : Left 0.6 ml _63 dapa ECV 1.1  Right 0.6 ml -154 dapa ECV 1.1    Assessment:     1. ETD (Eustachian tube dysfunction), bilateral    2. Seasonal allergic rhinitis, unspecified trigger        Plan:     ETD (Eustachian tube dysfunction), bilateral  -     fluticasone (FLONASE SENSIMIST) 27.5 mcg/actuation nasal spray; 2 sprays by  Nasal route once daily.  Dispense: 9.1 mL; Refill: 6    Seasonal allergic rhinitis, unspecified trigger  -     fluticasone (FLONASE SENSIMIST) 27.5 mcg/actuation nasal spray; 2 sprays by Nasal route once daily.  Dispense: 9.1 mL; Refill: 6        We had a long discussion regarding the anatomy and function of the eustachian tube.  We discussed that the eustachian tube acts as a pump to keep the appropriate amount of pressure behind the ear drum.  I gave the patient a prescription for a nasal steroid spray to be used on a daily basis, and we discussed that it will take 2-3 weeks of daily use to achieve maximal effectiveness.      AR: I have started her on a daily antihistamine as well as daily Flonase.     Discussed possible referral to neurology for Migraine management.

## 2024-08-05 ENCOUNTER — LAB VISIT (OUTPATIENT)
Dept: LAB | Facility: HOSPITAL | Age: 64
End: 2024-08-05
Attending: REGISTERED NURSE
Payer: COMMERCIAL

## 2024-08-05 ENCOUNTER — OFFICE VISIT (OUTPATIENT)
Dept: FAMILY MEDICINE | Facility: CLINIC | Age: 64
End: 2024-08-05
Payer: COMMERCIAL

## 2024-08-05 VITALS
HEART RATE: 77 BPM | RESPIRATION RATE: 18 BRPM | SYSTOLIC BLOOD PRESSURE: 148 MMHG | BODY MASS INDEX: 35.45 KG/M2 | HEIGHT: 66 IN | WEIGHT: 220.56 LBS | OXYGEN SATURATION: 98 % | DIASTOLIC BLOOD PRESSURE: 84 MMHG | TEMPERATURE: 98 F

## 2024-08-05 DIAGNOSIS — R53.83 MALAISE AND FATIGUE: ICD-10-CM

## 2024-08-05 DIAGNOSIS — R53.81 MALAISE AND FATIGUE: ICD-10-CM

## 2024-08-05 DIAGNOSIS — F43.9 STRESS: ICD-10-CM

## 2024-08-05 DIAGNOSIS — R03.0 ELEVATED BLOOD PRESSURE READING IN OFFICE WITHOUT DIAGNOSIS OF HYPERTENSION: ICD-10-CM

## 2024-08-05 PROBLEM — Z12.11 COLON CANCER SCREENING: Status: RESOLVED | Noted: 2023-06-28 | Resolved: 2024-08-05

## 2024-08-05 LAB
25(OH)D3+25(OH)D2 SERPL-MCNC: 75 NG/ML (ref 30–96)
ALBUMIN SERPL BCP-MCNC: 4.6 G/DL (ref 3.5–5.2)
ALP SERPL-CCNC: 69 U/L (ref 55–135)
ALT SERPL W/O P-5'-P-CCNC: 44 U/L (ref 10–44)
ANION GAP SERPL CALC-SCNC: 12 MMOL/L (ref 8–16)
AST SERPL-CCNC: 27 U/L (ref 10–40)
BASOPHILS # BLD AUTO: 0.03 K/UL (ref 0–0.2)
BASOPHILS NFR BLD: 0.5 % (ref 0–1.9)
BILIRUB SERPL-MCNC: 0.8 MG/DL (ref 0.1–1)
BUN SERPL-MCNC: 18 MG/DL (ref 8–23)
CALCIUM SERPL-MCNC: 10.2 MG/DL (ref 8.7–10.5)
CHLORIDE SERPL-SCNC: 106 MMOL/L (ref 95–110)
CO2 SERPL-SCNC: 24 MMOL/L (ref 23–29)
CREAT SERPL-MCNC: 0.7 MG/DL (ref 0.5–1.4)
DIFFERENTIAL METHOD BLD: ABNORMAL
EOSINOPHIL # BLD AUTO: 0.1 K/UL (ref 0–0.5)
EOSINOPHIL NFR BLD: 2.1 % (ref 0–8)
ERYTHROCYTE [DISTWIDTH] IN BLOOD BY AUTOMATED COUNT: 12.6 % (ref 11.5–14.5)
EST. GFR  (NO RACE VARIABLE): >60 ML/MIN/1.73 M^2
GLUCOSE SERPL-MCNC: 92 MG/DL (ref 70–110)
HCT VFR BLD AUTO: 41.2 % (ref 37–48.5)
HGB BLD-MCNC: 13.3 G/DL (ref 12–16)
IMM GRANULOCYTES # BLD AUTO: 0.04 K/UL (ref 0–0.04)
IMM GRANULOCYTES NFR BLD AUTO: 0.7 % (ref 0–0.5)
LYMPHOCYTES # BLD AUTO: 1.8 K/UL (ref 1–4.8)
LYMPHOCYTES NFR BLD: 30.8 % (ref 18–48)
MCH RBC QN AUTO: 30.5 PG (ref 27–31)
MCHC RBC AUTO-ENTMCNC: 32.3 G/DL (ref 32–36)
MCV RBC AUTO: 95 FL (ref 82–98)
MONOCYTES # BLD AUTO: 0.5 K/UL (ref 0.3–1)
MONOCYTES NFR BLD: 8 % (ref 4–15)
NEUTROPHILS # BLD AUTO: 3.3 K/UL (ref 1.8–7.7)
NEUTROPHILS NFR BLD: 57.9 % (ref 38–73)
NRBC BLD-RTO: 0 /100 WBC
PLATELET # BLD AUTO: 223 K/UL (ref 150–450)
PMV BLD AUTO: 10.5 FL (ref 9.2–12.9)
POTASSIUM SERPL-SCNC: 4.3 MMOL/L (ref 3.5–5.1)
PROT SERPL-MCNC: 7.7 G/DL (ref 6–8.4)
RBC # BLD AUTO: 4.36 M/UL (ref 4–5.4)
SODIUM SERPL-SCNC: 142 MMOL/L (ref 136–145)
TSH SERPL DL<=0.005 MIU/L-ACNC: 1.28 UIU/ML (ref 0.4–4)
VIT B12 SERPL-MCNC: 593 PG/ML (ref 210–950)
WBC # BLD AUTO: 5.75 K/UL (ref 3.9–12.7)

## 2024-08-05 PROCEDURE — 99214 OFFICE O/P EST MOD 30 MIN: CPT | Mod: S$GLB,,, | Performed by: REGISTERED NURSE

## 2024-08-05 PROCEDURE — 3079F DIAST BP 80-89 MM HG: CPT | Mod: CPTII,S$GLB,, | Performed by: REGISTERED NURSE

## 2024-08-05 PROCEDURE — 80053 COMPREHEN METABOLIC PANEL: CPT | Performed by: REGISTERED NURSE

## 2024-08-05 PROCEDURE — 82607 VITAMIN B-12: CPT | Performed by: REGISTERED NURSE

## 2024-08-05 PROCEDURE — 36415 COLL VENOUS BLD VENIPUNCTURE: CPT | Mod: PO | Performed by: REGISTERED NURSE

## 2024-08-05 PROCEDURE — 84443 ASSAY THYROID STIM HORMONE: CPT | Performed by: REGISTERED NURSE

## 2024-08-05 PROCEDURE — 85025 COMPLETE CBC W/AUTO DIFF WBC: CPT | Performed by: REGISTERED NURSE

## 2024-08-05 PROCEDURE — 99999 PR PBB SHADOW E&M-EST. PATIENT-LVL III: CPT | Mod: PBBFAC,,, | Performed by: REGISTERED NURSE

## 2024-08-05 PROCEDURE — 82306 VITAMIN D 25 HYDROXY: CPT | Performed by: REGISTERED NURSE

## 2024-08-05 PROCEDURE — 3077F SYST BP >= 140 MM HG: CPT | Mod: CPTII,S$GLB,, | Performed by: REGISTERED NURSE

## 2024-08-05 PROCEDURE — 3008F BODY MASS INDEX DOCD: CPT | Mod: CPTII,S$GLB,, | Performed by: REGISTERED NURSE

## 2024-08-08 ENCOUNTER — OFFICE VISIT (OUTPATIENT)
Dept: FAMILY MEDICINE | Facility: CLINIC | Age: 64
End: 2024-08-08
Payer: COMMERCIAL

## 2024-08-08 ENCOUNTER — LAB VISIT (OUTPATIENT)
Dept: LAB | Facility: HOSPITAL | Age: 64
End: 2024-08-08
Attending: INTERNAL MEDICINE
Payer: COMMERCIAL

## 2024-08-08 VITALS
SYSTOLIC BLOOD PRESSURE: 130 MMHG | TEMPERATURE: 98 F | BODY MASS INDEX: 34.98 KG/M2 | HEART RATE: 74 BPM | RESPIRATION RATE: 18 BRPM | DIASTOLIC BLOOD PRESSURE: 90 MMHG | WEIGHT: 216.69 LBS

## 2024-08-08 DIAGNOSIS — Z00.00 ANNUAL PHYSICAL EXAM: ICD-10-CM

## 2024-08-08 DIAGNOSIS — N95.1 MENOPAUSAL STATE: ICD-10-CM

## 2024-08-08 DIAGNOSIS — I10 ESSENTIAL HYPERTENSION: ICD-10-CM

## 2024-08-08 DIAGNOSIS — Z79.899 LONG TERM USE OF DRUG: Primary | ICD-10-CM

## 2024-08-08 LAB
CHOLEST SERPL-MCNC: 208 MG/DL (ref 120–199)
CHOLEST/HDLC SERPL: 3.9 {RATIO} (ref 2–5)
ESTIMATED AVG GLUCOSE: 100 MG/DL (ref 68–131)
HBA1C MFR BLD: 5.1 % (ref 4–5.6)
HDLC SERPL-MCNC: 53 MG/DL (ref 40–75)
HDLC SERPL: 25.5 % (ref 20–50)
LDLC SERPL CALC-MCNC: 125.2 MG/DL (ref 63–159)
NONHDLC SERPL-MCNC: 155 MG/DL
TRIGL SERPL-MCNC: 149 MG/DL (ref 30–150)

## 2024-08-08 PROCEDURE — 83036 HEMOGLOBIN GLYCOSYLATED A1C: CPT | Performed by: INTERNAL MEDICINE

## 2024-08-08 PROCEDURE — 80061 LIPID PANEL: CPT | Performed by: INTERNAL MEDICINE

## 2024-08-08 PROCEDURE — 36415 COLL VENOUS BLD VENIPUNCTURE: CPT | Mod: PO | Performed by: INTERNAL MEDICINE

## 2024-08-08 PROCEDURE — 99999 PR PBB SHADOW E&M-EST. PATIENT-LVL III: CPT | Mod: PBBFAC,,, | Performed by: INTERNAL MEDICINE

## 2024-08-08 RX ORDER — LOSARTAN POTASSIUM 25 MG/1
25 TABLET ORAL DAILY
Qty: 90 TABLET | Refills: 3 | Status: SHIPPED | OUTPATIENT
Start: 2024-08-08 | End: 2025-08-08

## 2024-09-23 ENCOUNTER — TELEPHONE (OUTPATIENT)
Dept: FAMILY MEDICINE | Facility: CLINIC | Age: 64
End: 2024-09-23
Payer: COMMERCIAL

## 2024-09-23 VITALS — SYSTOLIC BLOOD PRESSURE: 130 MMHG | DIASTOLIC BLOOD PRESSURE: 86 MMHG

## 2025-01-09 ENCOUNTER — OFFICE VISIT (OUTPATIENT)
Dept: SLEEP MEDICINE | Facility: CLINIC | Age: 65
End: 2025-01-09
Payer: COMMERCIAL

## 2025-01-09 VITALS
SYSTOLIC BLOOD PRESSURE: 110 MMHG | HEIGHT: 66 IN | WEIGHT: 204.81 LBS | HEART RATE: 77 BPM | OXYGEN SATURATION: 99 % | DIASTOLIC BLOOD PRESSURE: 76 MMHG | BODY MASS INDEX: 32.92 KG/M2 | RESPIRATION RATE: 19 BRPM

## 2025-01-09 DIAGNOSIS — G25.81 RESTLESS LEGS: ICD-10-CM

## 2025-01-09 DIAGNOSIS — G47.33 OSA (OBSTRUCTIVE SLEEP APNEA): Primary | ICD-10-CM

## 2025-01-09 PROCEDURE — 3008F BODY MASS INDEX DOCD: CPT | Mod: CPTII,S$GLB,, | Performed by: INTERNAL MEDICINE

## 2025-01-09 PROCEDURE — 99214 OFFICE O/P EST MOD 30 MIN: CPT | Mod: S$GLB,,, | Performed by: INTERNAL MEDICINE

## 2025-01-09 PROCEDURE — 99999 PR PBB SHADOW E&M-EST. PATIENT-LVL IV: CPT | Mod: PBBFAC,,, | Performed by: INTERNAL MEDICINE

## 2025-01-09 PROCEDURE — 3078F DIAST BP <80 MM HG: CPT | Mod: CPTII,S$GLB,, | Performed by: INTERNAL MEDICINE

## 2025-01-09 PROCEDURE — 3074F SYST BP LT 130 MM HG: CPT | Mod: CPTII,S$GLB,, | Performed by: INTERNAL MEDICINE

## 2025-01-09 PROCEDURE — 1159F MED LIST DOCD IN RCRD: CPT | Mod: CPTII,S$GLB,, | Performed by: INTERNAL MEDICINE

## 2025-01-09 PROCEDURE — 1160F RVW MEDS BY RX/DR IN RCRD: CPT | Mod: CPTII,S$GLB,, | Performed by: INTERNAL MEDICINE

## 2025-01-09 NOTE — PROGRESS NOTES
Pulmonary Outpatient  Visit     Subjective:       Patient ID: Samuel Kilgore is a 64 y.o. female.    Social History     Tobacco Use   Smoking Status Never   Smokeless Tobacco Never            Chief Complaint: Sleep Apnea      Samuel Kilgore is 62 y.o.  New to me   RAYMOND  Diagnoses over 10 years.    Sleep studies were done at another institution results not available to me   Has recalled [employer] dream Station device   Has not received a new machine.    Bedtime 12 midnight and wake-up time of 7:00 a.m..    [place] sleepiness score 3   Retired   Apparently using device and lead benefits   Needs new supplies.    Device also needs to be replaced.    Uses a nasal mask but however during allergy season unable to use a nasal mask and would like a fullface mask     History of breast cancer.    Also history of some spine issues in the past seen neuro surgery.    Chest x-ray today reviewed shows some slight scoliosis which patient was not aware about   I have explained to her that she is free to follow up with Neurosurgery if symptomatic      01/05/2024  Followup   Reveiwed sleep study  AHI 22.0 and RDI 36.0  Device works well   Wants larger mask  Has some ear issues right side  Await ENT  Download reviewed      01/09/2025  Followup  Has new machine  Doing well  RLS score 10  No respiratory issues  Recent Flu resolved  Using CPAP and benefits  APAP 14-20  Residual AHI 2.1  FFM  Variable bed time and wake time  Download reviewed                  Review of Systems   Constitutional:  Negative for fever, chills, activity change, appetite change and fatigue.   HENT:  Negative for postnasal drip, rhinorrhea, sinus pressure, trouble swallowing, voice change, congestion and hearing loss.    Respiratory:  Negative for apnea, cough, hemoptysis, sputum production, choking, chest tightness, shortness of breath, wheezing, orthopnea, previous hospitialization due to pulmonary problems, asthma  nighttime symptoms and use of rescue inhaler.    Cardiovascular:  Negative for chest pain, palpitations and leg swelling.   Genitourinary:  Negative for difficulty urinating and hematuria.   Endocrine:  Negative for cold intolerance and heat intolerance.    Musculoskeletal:  Negative for back pain, gait problem and joint swelling.   Skin:  Negative for rash.   Gastrointestinal:  Negative for abdominal pain and abdominal distention.   Neurological:  Negative for dizziness, weakness and headaches.   Hematological:  Negative for adenopathy.   Psychiatric/Behavioral:  Negative for sleep disturbance. The patient is not nervous/anxious.        Outpatient Encounter Medications as of 2025   Medication Sig Dispense Refill    fluticasone (FLONASE SENSIMIST) 27.5 mcg/actuation nasal spray 2 sprays by Nasal route once daily. (Patient not taking: Reported on 2024) 9.1 mL 6    losartan (COZAAR) 25 MG tablet Take 1 tablet (25 mg total) by mouth once daily. 90 tablet 3    meclizine (ANTIVERT) 12.5 mg tablet Take 1 tablet (12.5 mg total) by mouth 3 (three) times daily as needed for Dizziness. 30 tablet 1    melatonin 1 mg Tab Take by mouth.      naproxen (NAPROSYN) 500 MG tablet naproxen 500 mg tablet      rizatriptan (MAXALT) 10 MG tablet TAKE 1 TABLET BY MOUTH ONCE AS NEEDED FOR MIGRAINE 12 tablet 4     Facility-Administered Encounter Medications as of 2025   Medication Dose Route Frequency Provider Last Rate Last Admin    lactated ringers infusion   Intravenous Continuous Kathy Stewart MD   New Bag at 23 1059       The following portions of the patient's history were reviewed and updated as appropriate: She  has a past medical history of Anxiety, Breast cancer (), Encounter for blood transfusion, Lumbar spondylosis, Migraine headache, and Mild vitamin D deficiency.  She does not have any pertinent problems on file.  She  has a past surgical history that includes  section; Breast reconstruction  (Bilateral, 2010); Mastectomy (Bilateral, 2010); and Colonoscopy (N/A, 6/28/2023).  Her family history includes Allergies in her child; Asthma in her child; Colon cancer in her mother and paternal grandmother; Diabetes in her maternal grandfather and paternal grandmother; Heart attack in her mother; Heart disease in her maternal grandfather; Hypertension in her mother; Melanoma in her paternal grandmother.  She  reports that she has never smoked. She has never used smokeless tobacco. She reports current alcohol use. She reports that she does not use drugs.  She has a current medication list which includes the following prescription(s): flonase sensimist, losartan, meclizine, melatonin, naproxen, and rizatriptan, and the following Facility-Administered Medications: lactated ringers.  Current Outpatient Medications on File Prior to Visit   Medication Sig Dispense Refill    fluticasone (FLONASE SENSIMIST) 27.5 mcg/actuation nasal spray 2 sprays by Nasal route once daily. (Patient not taking: Reported on 8/8/2024) 9.1 mL 6    losartan (COZAAR) 25 MG tablet Take 1 tablet (25 mg total) by mouth once daily. 90 tablet 3    meclizine (ANTIVERT) 12.5 mg tablet Take 1 tablet (12.5 mg total) by mouth 3 (three) times daily as needed for Dizziness. 30 tablet 1    melatonin 1 mg Tab Take by mouth.      naproxen (NAPROSYN) 500 MG tablet naproxen 500 mg tablet      rizatriptan (MAXALT) 10 MG tablet TAKE 1 TABLET BY MOUTH ONCE AS NEEDED FOR MIGRAINE 12 tablet 4     Current Facility-Administered Medications on File Prior to Visit   Medication Dose Route Frequency Provider Last Rate Last Admin    lactated ringers infusion   Intravenous Continuous Kathy Stewart MD   New Bag at 06/28/23 1059     She is allergic to no known allergies..      BP Readings from Last 3 Encounters:   01/09/25 110/76   09/23/24 130/86   08/08/24 (!) 130/90            MMRC Dyspnea Scale (4 is worst)     [x] MMRC 0: Dyspneic on strenuous excercise (0 points)  "   [] MMRC 1: Dyspneic on walking a slight hill (0 points)    [] MMRC 2: Dyspneic on walking level ground; must stop occasionally due to breathlessness (1 point)    [] MMRC 3: Must stop for breathlessness after walking 100 yards or after a few minutes (2 points)    [] MMRC 4: Cannot leave house; breathless on dressing/undressing (3 points)                       1/9/2025   EPWORTH SLEEPINESS SCALE   Sitting and reading 0   Watching TV 0   Sitting, inactive in a public place (e.g. a theatre or a meeting) 0   As a passenger in a car for an hour without a break 3   Lying down to rest in the afternoon when circumstances permit 3   Sitting and talking to someone 0   Sitting quietly after a lunch without alcohol 0   In a car, while stopped for a few minutes in traffic 0   Total score 6             No data to display                          Objective:     Vital Signs (Most Recent)  Vital Signs  Pulse: 77  Resp: 19  SpO2: 99 %  BP: 110/76  Pain Score: 0-No pain  Height and Weight  Height: 5' 6" (167.6 cm)  Weight: 92.9 kg (204 lb 12.9 oz)  BSA (Calculated - sq m): 2.08 sq meters  BMI (Calculated): 33.1  Weight in (lb) to have BMI = 25: 154.6]  Wt Readings from Last 2 Encounters:   01/09/25 92.9 kg (204 lb 12.9 oz)   08/08/24 98.3 kg (216 lb 11.4 oz)       Physical Exam  Vitals and nursing note reviewed.   Constitutional:       Appearance: She is obese.      Comments: Neck 19"   HENT:      Head: Normocephalic and atraumatic.      Nose: Nose normal.   Eyes:      Pupils: Pupils are equal, round, and reactive to light.   Cardiovascular:      Rate and Rhythm: Normal rate and regular rhythm.      Pulses: Normal pulses.      Heart sounds: Normal heart sounds.   Pulmonary:      Effort: Pulmonary effort is normal.      Breath sounds: Normal breath sounds.   Abdominal:      General: Bowel sounds are normal.      Palpations: Abdomen is soft.   Musculoskeletal:      Cervical back: Normal range of motion.   Skin:     General: Skin is " "warm.   Neurological:      General: No focal deficit present.      Mental Status: She is alert and oriented to person, place, and time.   Psychiatric:         Mood and Affect: Mood normal.          Laboratory  Lab Results   Component Value Date    WBC 5.75 08/05/2024    RBC 4.36 08/05/2024    HGB 13.3 08/05/2024    HCT 41.2 08/05/2024    MCV 95 08/05/2024    MCH 30.5 08/05/2024    MCHC 32.3 08/05/2024    RDW 12.6 08/05/2024     08/05/2024    MPV 10.5 08/05/2024    GRAN 3.3 08/05/2024    GRAN 57.9 08/05/2024    LYMPH 1.8 08/05/2024    LYMPH 30.8 08/05/2024    MONO 0.5 08/05/2024    MONO 8.0 08/05/2024    EOS 0.1 08/05/2024    BASO 0.03 08/05/2024    EOSINOPHIL 2.1 08/05/2024    BASOPHIL 0.5 08/05/2024       BMP  Lab Results   Component Value Date     08/05/2024    K 4.3 08/05/2024     08/05/2024    CO2 24 08/05/2024    BUN 18 08/05/2024    CREATININE 0.7 08/05/2024    CALCIUM 10.2 08/05/2024    ANIONGAP 12 08/05/2024    ESTGFRAFRICA >60.0 10/19/2021    EGFRNONAA >60.0 10/19/2021    AST 27 08/05/2024    ALT 44 08/05/2024    PROT 7.7 08/05/2024          Lab Results   Component Value Date    IGE <35 11/18/2014        Lab Results   Component Value Date    ASPERGILLUS <0.35 11/18/2014     Lab Results   Component Value Date    AFUMIGATUSCL CLASS 0 11/18/2014        No results found for: "ACE"     Diagnostic Results:  I have personally reviewed today the following studies:    X-Ray Chest PA And Lateral  Narrative: EXAMINATION:  XR CHEST PA AND LATERAL    CLINICAL HISTORY:  Sleep apnea, unspecified    TECHNIQUE:  PA and lateral views of the chest were performed.    COMPARISON:  Prior radiographs    FINDINGS:  Cardiac silhouette and mediastinal contours are normal.  Lungs demonstrate minimal basilar scarring.  No pleural effusion.  Osseous structures are intact.  Impression: No acute cardiopulmonary process.    Electronically signed by: Olayinka Bolivar MD  Date:    09/22/2023  Time:    12:07     PHYSICIAN " INTERPRETATION AND COMMENTS: Findings are consistent with moderate, positional obstructive sleep  apnea(RAYMOND). Therapy with CPAP indicated  CLINICAL HISTORY: 62 year old female presented with: 15.25 inch neck, BMI of 34.9, an [place] sleepiness score of 7,  history of a previous diagnosis of RAYMOND and symptoms of nocturnal snoring and witnessed apneas. Based on the clinical  history, the patient has a high pre-test probability of having Moderate RAYMOND.  SLEEP STUDY FINDINGS: Patient underwent a 1 night Home Sleep Test and by behavioral criteria, slept for approximately  6.16 hours, with a sleep latency of 6 minutes and a sleep efficiency of 89%. Moderate sleep disordered breathing (AHI=22)  is noted based on a 4% hypopnea desaturation criteria. The patient slept supine 47.9% of the night based on valid  recording time of 5.87 hours and is 1.3 times as likely to have apneas/hypopneas when supine. When considering more  subtle measures of sleep disordered breathing, the overall respiratory disturbance index is moderate(RDI=36) based on a  1% hypopnea desaturation criteria with confirmation by surrogate arousal indicators, predominantly in the supine  position (47 events/hour). The apneas/hypopneas are accompanied by mild oxygen desaturation (percent time below 90%  SpO2: 6%, Min SpO2: 81.2%). The average desaturation across all sleep disordered breathing events is 4.3%. Snoring occurs  for 0.4% (30 dB) of the study. The mean pulse rate is 56.4 BPM, with very frequent pulse rate variability (62 events with >= 6  BPM increase/decrease per hour).  TREATMENT CONSIDERATIONS: Consider nasal continuous positive airway pressure (CPAP/AutoPAP) as the initial  treatment choice based on the AHI severity and co-morbidities. A mandibular advancement splint (MAS) or referral to an  ENT surgeon for modification to the airway should be considered to reduce the potential contribution of RAYMOND on existing  diseases if the patient prefers an  alternative therapy or the CPAP trial is unsuccessful. Based on the RAYMOND Supine data in the  study, a Mandibular Advancement Splint (MAS) will likely provide treatment benefit independent of RAYMOND severity. The  patient should avoid sleeping supine; the non-supine RDI is 1.8 times less severe than the supine RDI.  DISEASE MANAGEMENT CONSIDERATIONS: Insomnia is a symptom that can be associated with untreated RAYMOND.      Compliance Information 12/7/2023 - 1/5/2024  Compliance Summary  12/7/2023 - 1/5/2024 (30 days)  Days with Device Usage 30 days  Days without Device Usage 0 days  Percent Days with Device Usage 100.0%  Cumulative Usage 10 days 7 hrs. 57 mins. 5 secs.  Maximum Usage (1 Day) 12 hrs. 16 mins. 8 secs.  Average Usage (All Days) 8 hrs. 15 mins. 54 secs.  Average Usage (Days Used) 8 hrs. 15 mins. 54 secs.  Minimum Usage (1 Day) 59 secs.  Percent of Days with Usage >= 4 Hours 96.7%  Percent of Days with Usage < 4 Hours 3.3%  Date Range  Total Blower Time 10 days 8 hrs. 23 mins. 12 secs.  Average AHI 1.7  Auto-CPAP Summary  Auto-CPAP Mean Pressure 14.5 cmH2O  Auto-CPAP Peak Average Pressure 15.9 cmH2O  Device Pressure <= 90% of Time 15.9 cmH2O  Average Time in Large Leak Per Day 18 secs.      Compliance Information 10/8/2023 - 1/5/2024  Compliance Summary  10/8/2023 - 1/5/2024 (90 days)  Days with Device Usage 76 days  Days without Device Usage 14 days  Percent Days with Device Usage 84.4%  Cumulative Usage 27 days 4 hrs. 28 mins. 39 secs.  Maximum Usage (1 Day) 12 hrs. 16 mins. 8 secs.  Average Usage (All Days) 7 hrs. 14 mins. 59 secs.  Average Usage (Days Used) 8 hrs. 35 mins. 6 secs.  Minimum Usage (1 Day) 59 secs.  Percent of Days with Usage >= 4 Hours 82.2%  Percent of Days with Usage < 4 Hours 17.8%  Date Range  Total Blower Time 27 days 5 hrs. 12 mins. 32 secs.  Average AHI 2.1  Auto-CPAP Summary  Auto-CPAP Mean Pressure 14.4 cmH2O  Auto-CPAP Peak Average Pressure 17.2 cmH2O  Device Pressure <= 90% of Time  15.9 cmH2O  Average Time in Large Leak Per Day 40 secs.  Assessment/Plan:       Problem List Items Addressed This Visit       RAYMOND (obstructive sleep apnea) - Primary    Current Assessment & Plan          1/5/2024    12:57 PM   EPWORTH SLEEPINESS SCALE   Sitting and reading 0   Watching TV 1   Sitting, inactive in a public place (e.g. a theatre or a meeting) 0   As a passenger in a car for an hour without a break 0   Lying down to rest in the afternoon when circumstances permit 2   Sitting and talking to someone 0   Sitting quietly after a lunch without alcohol 0   In a car, while stopped for a few minutes in traffic 0   Total score 3         Data 12/07/2023 to 01/05/2024  APAP 14-20  Usage > 4 hrs was 96.7%     AHI 1.7     USING AND BENEFITS         Restless legs    Current Assessment & Plan     RLS score 10             Has new machine, dream station       Follow up in about 1 year (around 1/9/2026), or download, sleep hygiene.    This note was prepared using voice recognition system and is likely to have sound alike errors that may have been overlooked even after proof reading.  Please call me with any questions    Discussed diagnosis, its evaluation, treatment and usual course. All questions answered.    Thank you for the courtesy of participating in the care of this patient    Madan Giraldo MD      Personal Diagnostic Review  []  CXR    []  ECHO    []  ONSAT    []  6MWD    []  LABS    []  CHEST CT    []  PET CT    []  Biopsy results

## 2025-01-10 ENCOUNTER — OFFICE VISIT (OUTPATIENT)
Dept: DERMATOLOGY | Facility: CLINIC | Age: 65
End: 2025-01-10
Payer: COMMERCIAL

## 2025-01-10 DIAGNOSIS — D22.9 MULTIPLE BENIGN NEVI: ICD-10-CM

## 2025-01-10 DIAGNOSIS — D18.00 HEMANGIOMA, UNSPECIFIED SITE: ICD-10-CM

## 2025-01-10 DIAGNOSIS — Z12.83 SKIN CANCER SCREENING: Primary | ICD-10-CM

## 2025-01-10 DIAGNOSIS — L82.1 SEBORRHEIC KERATOSES: ICD-10-CM

## 2025-01-10 DIAGNOSIS — L81.4 SOLAR LENTIGO: ICD-10-CM

## 2025-01-10 PROCEDURE — 99999 PR PBB SHADOW E&M-EST. PATIENT-LVL III: CPT | Mod: PBBFAC,,, | Performed by: STUDENT IN AN ORGANIZED HEALTH CARE EDUCATION/TRAINING PROGRAM

## 2025-01-10 NOTE — PROGRESS NOTES
Patient Information  Name: Samuel Kilgore  : 1960  MRN: [id number]     Referring Physician:  Dr. Ocampo   Primary Care Physician:  Shellie Cortes DO   Date of Visit: 01/10/2025      Subjective:       Samuel Kilgore is a 64 y.o. female who presents for   Chief Complaint   Patient presents with    Skin Check     Full body skin exam      HPI  Patient here for skin check.     Does patient have a personal hx of skin cancers? no  Does patient have family hx of melanoma?  no  Does patient have hx of strong sun exposure or tanning bed use in the past? yes    Patient was last seen:Visit date not found     Prior notes by myself reviewed.   Clinical documentation obtained by nursing staff reviewed.    Review of Systems   Skin:  Negative for itching and rash.        Objective:    Physical Exam   Constitutional: She appears well-developed and well-nourished. No distress.   Neurological: She is alert and oriented to person, place, and time. She is not disoriented.   Psychiatric: She has a normal mood and affect.   Skin:   Areas Examined (abnormalities noted in diagram):   Scalp / Hair Palpated and Inspected  Head / Face Inspection Performed  Neck Inspection Performed  Chest / Axilla Inspection Performed  Abdomen Inspection Performed  Genitals / Buttocks / Groin Inspection Performed  Back Inspection Performed  RUE Inspected  LUE Inspection Performed  RLE Inspected  LLE Inspection Performed  Nails and Digits Inspection Performed                       Diagram Legend     Erythematous scaling macule/papule c/w actinic keratosis       Vascular papule c/w angioma      Pigmented verrucoid papule/plaque c/w seborrheic keratosis      Yellow umbilicated papule c/w sebaceous hyperplasia      Irregularly shaped tan macule c/w lentigo     1-2 mm smooth white papules consistent with Milia      Movable subcutaneous cyst with punctum c/w epidermal inclusion cyst      Subcutaneous movable cyst c/w pilar cyst      Firm pink to  brown papule c/w dermatofibroma      Pedunculated fleshy papule(s) c/w skin tag(s)      Evenly pigmented macule c/w junctional nevus     Mildly variegated pigmented, slightly irregular-bordered macule c/w mildly atypical nevus      Flesh colored to evenly pigmented papule c/w intradermal nevus       Pink pearly papule/plaque c/w basal cell carcinoma      Erythematous hyperkeratotic cursted plaque c/w SCC      Surgical scar with no sign of skin cancer recurrence      Open and closed comedones      Inflammatory papules and pustules      Verrucoid papule consistent consistent with wart     Erythematous eczematous patches and plaques     Dystrophic onycholytic nail with subungual debris c/w onychomycosis     Umbilicated papule    Erythematous-base heme-crusted tan verrucoid plaque consistent with inflamed seborrheic keratosis     Erythematous Silvery Scaling Plaque c/w Psoriasis     See annotation      No images are attached to the encounter or orders placed in the encounter.    [] Data reviewed  [] Independent review of test  [] Management discussed with another provider    Assessment / Plan:        Skin cancer screening  Total body skin examination performed today including at least 12 points as noted in physical examination. No lesions suspicious for malignancy noted.    Recommend daily sun protection/avoidance, use of at least SPF 30, broad spectrum sunscreen (OTC drug), skin self examinations, and routine physician surveillance to optimize early detection    Seborrheic keratoses  These are benign inherited growths without a malignant potential. Reassurance given to patient. No treatment is necessary.     Solar lentigo  This is a benign hyperpigmented sun induced lesion. Recommend daily sun protection/avoidance and use of at least SPF 30, broad spectrum sunscreen (OTC drug) will reduce the number of new lesions. Treatment of these benign lesions are considered cosmetic.    Hemangioma, unspecified site  This is a  benign vascular lesion. Reassurance given. No treatment required.     Multiple benign nevi  Discussed ABCDE's of nevi.  Monitor for new mole or moles that are becoming bigger, darker, irritated, or developing irregular borders. Brochure provided. Instructed patient to observe lesion(s) for changes and follow up in clinic if changes are noted. Patient to monitor skin at home for new or changing lesions.              LOS NUMBER AND COMPLEXITY OF PROBLEMS    COMPLEXITY OF DATA RISK TOTAL TIME (m)   50399  21878 [] 1 self-limited or minor problem [x] Minimal to none [] No treatment recommended or patient to monitor 15-29  10-19   20185  62643 Low  [] 2 or > self limited or minor problems  [] 1 stable chronic illness  [] 1 acute, uncomplicated illness or injury Limited (2)  [] Prior external notes from each unique source  [] Review result of each unique test  [] Order each unique test [x]  Low  OTC medications, minor skin biopsy 30-44  20-29   68023  85717 Moderate  []  1 or > chronic illness with progression, exacerbation or SE of treatment  [x]  2 or more stable chronic illnesses  []  1 acute illness with systemic symptoms  []  1 acute complicated injury  []  1 undiagnosed new problem with uncertain prognosis Moderate (1/3 below)  []  3 or more data items        *Now includes assessment requiring independent historian  []  Independent interpretation of a test  []  Discuss management/test with another provider Moderate  []  Prescription drug mgmt  []  Minor surgery with risk discussed  []  Mgmt limited by social determinates 45-59  30-39   69755  52653 High  []  1 or more chronic illness with severe exacerbation, progression or SE of treatment  []  1 acute or chronic illness/injury that poses a threat to life or bodily function Extensive (2/3 below)  []  3 or more data items        *Now includes assessment requiring independent historian.  []  Independent interpretation of a test  []  Discuss management/test with  another provider High  []  Major surgery with risk discussed  []  Drug therapy requiring intensive monitoring for toxicity  []  Hospitalization  []  Decision for DNR 60-74  40-54      No follow-ups on file.    Malou Torres MD, FAAD  [facility]

## 2025-04-14 DIAGNOSIS — I10 ESSENTIAL HYPERTENSION: ICD-10-CM

## 2025-04-14 RX ORDER — LOSARTAN POTASSIUM 25 MG/1
25 TABLET ORAL DAILY
Qty: 90 TABLET | Refills: 1 | Status: SHIPPED | OUTPATIENT
Start: 2025-04-14

## 2025-04-14 NOTE — TELEPHONE ENCOUNTER
No care due was identified.  [facility] Embedded Care Due Messages. Reference number: [id number].   4/14/2025 11:57:15 AM CDT

## 2025-04-15 NOTE — TELEPHONE ENCOUNTER
Refill Decision Note   Miltonleela Jame  is requesting a refill authorization.  Brief Assessment and Rationale for Refill:  Approve     Medication Therapy Plan:         Comments:     Note composed:9:38 PM 04/14/2025

## 2025-06-04 ENCOUNTER — PATIENT MESSAGE (OUTPATIENT)
Dept: ADMINISTRATIVE | Facility: HOSPITAL | Age: 65
End: 2025-06-04
Payer: COMMERCIAL

## 2025-06-05 ENCOUNTER — PATIENT OUTREACH (OUTPATIENT)
Dept: ADMINISTRATIVE | Facility: HOSPITAL | Age: 65
End: 2025-06-05
Payer: COMMERCIAL

## 2025-06-06 ENCOUNTER — RESULTS FOLLOW-UP (OUTPATIENT)
Dept: FAMILY MEDICINE | Facility: CLINIC | Age: 65
End: 2025-06-06

## 2025-06-06 ENCOUNTER — APPOINTMENT (OUTPATIENT)
Dept: RADIOLOGY | Facility: HOSPITAL | Age: 65
End: 2025-06-06
Attending: INTERNAL MEDICINE
Payer: COMMERCIAL

## 2025-06-06 DIAGNOSIS — N95.1 MENOPAUSAL STATE: ICD-10-CM

## 2025-06-06 PROCEDURE — 77080 DXA BONE DENSITY AXIAL: CPT | Mod: 26,,, | Performed by: RADIOLOGY

## 2025-06-06 PROCEDURE — 77080 DXA BONE DENSITY AXIAL: CPT | Mod: TC

## 2025-06-09 ENCOUNTER — TELEPHONE (OUTPATIENT)
Dept: FAMILY MEDICINE | Facility: CLINIC | Age: 65
End: 2025-06-09
Payer: COMMERCIAL

## 2025-06-09 NOTE — TELEPHONE ENCOUNTER
----- Message from Shellie Baptiste DO sent at 6/6/2025 11:14 AM CDT -----  Normal bone density  ----- Message -----  From: Interface, Rad Results In  Sent: 6/6/2025  11:06 AM CDT  To: Shellie Baptiste DO

## 2025-06-14 ENCOUNTER — PATIENT MESSAGE (OUTPATIENT)
Dept: FAMILY MEDICINE | Facility: CLINIC | Age: 65
End: 2025-06-14
Payer: COMMERCIAL

## 2025-06-14 DIAGNOSIS — G43.009 MIGRAINE WITHOUT AURA AND WITHOUT STATUS MIGRAINOSUS, NOT INTRACTABLE: ICD-10-CM

## 2025-06-16 RX ORDER — RIZATRIPTAN BENZOATE 10 MG/1
10 TABLET ORAL DAILY PRN
Qty: 12 TABLET | Refills: 0 | Status: SHIPPED | OUTPATIENT
Start: 2025-06-16

## 2025-06-16 NOTE — TELEPHONE ENCOUNTER
Care Due:                  Date            Visit Type   Department     Provider  --------------------------------------------------------------------------------                                NP -                              PRIMARY      [facility]  Last Visit: 08-      CARE ([facility])   MEDICINE       Shellie Baptiste  Next Visit: None Scheduled  None         None Found                                                            Last  Test          Frequency    Reason                     Performed    Due Date  --------------------------------------------------------------------------------    CMP.........  12 months..  losartan.................  08- 08-    [facility] Embedded Care Due Messages. Reference number: [id number].   6/16/2025 8:30:58 AM CDT

## 2025-06-30 ENCOUNTER — OFFICE VISIT (OUTPATIENT)
Dept: OBSTETRICS AND GYNECOLOGY | Facility: CLINIC | Age: 65
End: 2025-06-30
Payer: COMMERCIAL

## 2025-06-30 VITALS
WEIGHT: 202.63 LBS | SYSTOLIC BLOOD PRESSURE: 122 MMHG | DIASTOLIC BLOOD PRESSURE: 86 MMHG | HEIGHT: 66 IN | BODY MASS INDEX: 32.56 KG/M2

## 2025-06-30 DIAGNOSIS — Z85.3 HISTORY OF BREAST CANCER: ICD-10-CM

## 2025-06-30 DIAGNOSIS — Z01.419 ENCOUNTER FOR GYNECOLOGICAL EXAMINATION WITHOUT ABNORMAL FINDING: Primary | ICD-10-CM

## 2025-06-30 DIAGNOSIS — Z78.0 POSTMENOPAUSAL: ICD-10-CM

## 2025-06-30 DIAGNOSIS — Z90.13 H/O BILATERAL MASTECTOMY: ICD-10-CM

## 2025-06-30 PROCEDURE — 4010F ACE/ARB THERAPY RXD/TAKEN: CPT | Mod: CPTII,S$GLB,, | Performed by: NURSE PRACTITIONER

## 2025-06-30 PROCEDURE — 3008F BODY MASS INDEX DOCD: CPT | Mod: CPTII,S$GLB,, | Performed by: NURSE PRACTITIONER

## 2025-06-30 PROCEDURE — 87624 HPV HI-RISK TYP POOLED RSLT: CPT | Performed by: NURSE PRACTITIONER

## 2025-06-30 PROCEDURE — 3079F DIAST BP 80-89 MM HG: CPT | Mod: CPTII,S$GLB,, | Performed by: NURSE PRACTITIONER

## 2025-06-30 PROCEDURE — 1160F RVW MEDS BY RX/DR IN RCRD: CPT | Mod: CPTII,S$GLB,, | Performed by: NURSE PRACTITIONER

## 2025-06-30 PROCEDURE — 3074F SYST BP LT 130 MM HG: CPT | Mod: CPTII,S$GLB,, | Performed by: NURSE PRACTITIONER

## 2025-06-30 PROCEDURE — 1159F MED LIST DOCD IN RCRD: CPT | Mod: CPTII,S$GLB,, | Performed by: NURSE PRACTITIONER

## 2025-06-30 PROCEDURE — 99386 PREV VISIT NEW AGE 40-64: CPT | Mod: S$GLB,,, | Performed by: NURSE PRACTITIONER

## 2025-06-30 PROCEDURE — 99999 PR PBB SHADOW E&M-EST. PATIENT-LVL III: CPT | Mod: PBBFAC,,, | Performed by: NURSE PRACTITIONER

## 2025-06-30 RX ORDER — CETIRIZINE HYDROCHLORIDE 5 MG/1
5 TABLET ORAL DAILY
COMMUNITY

## 2025-06-30 NOTE — PROGRESS NOTES
"CC: Well woman exam    Samuel Kilgore is a 64 y.o. female  presents for well woman exam.  LMP: No LMP recorded. Patient is postmenopausal..      Health Maintenance Topics with due status: Not Due       Topic Last Completion Date    TETANUS VACCINE 2019    Influenza Vaccine 10/19/2021    Colorectal Cancer Screening 2023    Hemoglobin A1c (Diabetic Prevention Screening) 2024    Lipid Panel 2024    DEXA Scan 2025     Past Medical History:   Diagnosis Date    Anxiety     Breast cancer     Encounter for blood transfusion     Lumbar spondylosis     Migraine headache     Mild vitamin D deficiency      Past Surgical History:   Procedure Laterality Date    BREAST RECONSTRUCTION Bilateral      SECTION      COLONOSCOPY N/A 2023    Procedure: COLONOSCOPY;  Surgeon: Kathy Stewart MD;  Location: [facility];  Service: Endoscopy;  Laterality: N/A;    MASTECTOMY Bilateral      Social History[1]  Family History   Problem Relation Name Age of Onset    Colon cancer Mother      Hypertension Mother      Heart attack Mother      Heart disease Maternal Grandfather      Diabetes Maternal Grandfather      Diabetes Paternal Grandmother      Colon cancer Paternal Grandmother      Melanoma Paternal Grandmother      Allergies Child      Asthma Child      Cancer Neg Hx      Miscarriages / Stillbirths Neg Hx      Psoriasis Neg Hx      Lupus Neg Hx      Eczema Neg Hx      Acne Neg Hx       OB History          3    Para   3    Term   3            AB        Living   3         SAB        IAB        Ectopic        Multiple        Live Births                     /86 (Patient Position: Sitting)   Ht 5' 6" (1.676 m)   Wt 91.9 kg (202 lb 9.6 oz)   BMI 32.70 kg/m²       ROS:  Per hpi    PHYSICAL EXAM:  APPEARANCE: Well nourished, well developed, in no acute distress.  AFFECT: WNL, alert and oriented x 3  SKIN: No acne or hirsutism  NECK: Neck symmetric without masses or " thyromegaly  NODES: No inguinal, cervical, axillary, or femoral lymph node enlargement  CHEST: Good respiratory effect  ABDOMEN: Soft.  No tenderness or masses.  No hepatosplenomegaly.  No hernias.  BREASTS: hx mastectomy  PELVIC: Normal external genitalia without lesions.  Normal hair distribution.  Adequate perineal body, normal urethral meatus.  Vagina atrophy without lesions or discharge.  Cervix pink, without lesions, discharge or tenderness.  No significant cystocele or rectocele.  Bimanual exam shows uterus to be normal size, regular, mobile and nontender.  Adnexa without masses or tenderness.    EXTREMITIES: No edema.  Physical Exam    1. Encounter for gynecological examination without abnormal finding  Liquid-Based Pap Smear, Screening      2. Postmenopausal        3. History of breast cancer        4. H/O bilateral mastectomy         AND PLAN:    Samuel was seen today for well woman.    Diagnoses and all orders for this visit:    Encounter for gynecological examination without abnormal finding  -     Liquid-Based Pap Smear, Screening    Postmenopausal    History of breast cancer    H/O bilateral mastectomy         Patient was counseled today on A.C.S. Pap guidelines and recommendations for yearly pelvic exams, mammograms and monthly self breast exams; to see her PCP for other health maintenance.                          [1]   Social History  Socioeconomic History    Marital status:    Tobacco Use    Smoking status: Never    Smokeless tobacco: Never   Substance and Sexual Activity    Alcohol use: Yes     Comment: occassionally    Drug use: No    Sexual activity: Not Currently     Partners: Male   Other Topics Concern    Are you pregnant or think you may be? No    Breast-feeding No

## 2025-07-17 ENCOUNTER — TELEPHONE (OUTPATIENT)
Dept: OBSTETRICS AND GYNECOLOGY | Facility: CLINIC | Age: 65
End: 2025-07-17
Payer: COMMERCIAL

## 2025-07-17 NOTE — TELEPHONE ENCOUNTER
Copied from CRM #[id number]. Topic: Appointments - Appointment Access  >> Jul 16, 2025  4:04 PM Margarette wrote:  .Type:  Patient Returning Call    Who Called: Samuel   Who Left Message for Patient: ivanna   Does the patient know what this is regarding?: pt wanting to discuss surgery dates   Would the patient rather a call back or a response via [facility]?  Call   Best Call Back Number: [phone] (M)    Additional Information:

## 2025-07-17 NOTE — TELEPHONE ENCOUNTER
Copied from CRM #[id number]. Topic: Appointments - Appointment Access  >> Jul 16, 2025  4:04 PM Margaretet wrote:  .Type:  Patient Returning Call    Who Called: Samuel   Who Left Message for Patient: ivanna   Does the patient know what this is regarding?: pt wanting to discuss surgery dates   Would the patient rather a call back or a response via [facility]?  Call   Best Call Back Number: [phone] (M)    Additional Information:

## 2025-07-26 DIAGNOSIS — I10 ESSENTIAL HYPERTENSION: ICD-10-CM

## 2025-07-26 NOTE — TELEPHONE ENCOUNTER
No care due was identified.  [facility] Embedded Care Due Messages. Reference number: [id number].   7/26/2025 8:16:48 AM CDT

## 2025-07-27 RX ORDER — LOSARTAN POTASSIUM 25 MG/1
25 TABLET ORAL
Qty: 90 TABLET | Refills: 0 | Status: SHIPPED | OUTPATIENT
Start: 2025-07-27

## 2025-07-27 NOTE — TELEPHONE ENCOUNTER
Refill Decision Note   Miltonleela Jame  is requesting a refill authorization.  Brief Assessment and Rationale for Refill:  Approve     Medication Therapy Plan:        Comments:     Note composed:5:11 PM 07/27/2025

## 2025-08-05 ENCOUNTER — PROCEDURE VISIT (OUTPATIENT)
Dept: OBSTETRICS AND GYNECOLOGY | Facility: CLINIC | Age: 65
End: 2025-08-05
Payer: COMMERCIAL

## 2025-08-05 VITALS
SYSTOLIC BLOOD PRESSURE: 124 MMHG | DIASTOLIC BLOOD PRESSURE: 88 MMHG | BODY MASS INDEX: 33.24 KG/M2 | HEIGHT: 66 IN | WEIGHT: 206.81 LBS

## 2025-08-05 DIAGNOSIS — Z85.3 HISTORY OF BREAST CANCER: ICD-10-CM

## 2025-08-05 DIAGNOSIS — R87.610 ASCUS OF CERVIX WITH NEGATIVE HIGH RISK HPV: Primary | ICD-10-CM

## 2025-08-05 PROCEDURE — 88305 TISSUE EXAM BY PATHOLOGIST: CPT | Mod: TC | Performed by: NURSE PRACTITIONER

## 2025-08-05 PROCEDURE — 57454 BX/CURETT OF CERVIX W/SCOPE: CPT | Mod: S$GLB,,, | Performed by: NURSE PRACTITIONER

## 2025-08-05 NOTE — PROCEDURES
Colposcopy W/BIOPSY AND ECC- Today    Date/Time: 8/5/2025 2:15 PM    Performed by: Elizabeth Hunt NP  Authorized by: Elizabeth Hunt NP    Consent obatined:  Prior to procedure the appropriate consent was completed and verified  Timeout:Immediately prior to procedure a time out was called to verify the correct patient, procedure, equipment, support staff and site/side marked as required  Prep:Patient was prepped and draped in the usual sterile fashion  Assistants?: No      Colposcopy Site:  Cervix  Position:  Supine  Acrowhite Lesion? Yes    Atypical Vessels: No    Transformation Zone Adequate?: Yes    Biopsy?: Yes         Location:  Cervix ((12 00))  ECC Performed?: Yes    LEEP Performed?: No    Estimated blood loss (cc):  3   Patient tolerated the procedure well with no immediate complications.   Post-operative instructions were provided for the patient.   Patient was discharged and will follow up if any complications occur     CC: Presents for COLPOSCOPY:    Samuel Kilgore is a 64 y.o. female who presents for a colposcopy secondary to abnormal pap smear.      UPT is negative.    INDICATIONS: Her most recent papsmear showed: ASC cannot exclude high grade lesion (ASCH)  HPV was negative     She does not have a history of abnormal pap smears.      PRE-COLPOSCOPY PROCEDURE COUNSELING:  Discussed the abnormal pap test findings, HPV, need for colposcopy and possible biopsies to determine a diagnosis and plan of care, treatments available, the minimal risks of bleeding and infection with a colposcopy, alternatives to colposcopy and she agrees to proceed.  Patient was given the opportunity to ask questions and verbal consent was obtained.        COLPOSCOPY EXAM:   TIME OUT PERFORMED.     acetowhite lesion(s) noted at 12 o'clock    Biopsy was taken at 12 o'clock.  ECC was performed.  Minimal blood loss.        The speculum was removed. The patient tolerated the procedure well.  There were no complications.       IMPRESSION:   Abnormal Pap    POST COLPOSCOPY COUNSELING:   Manage post colposcopy cramping with NSAIDs, Tylenol or Rx per [employer].  Avoid anything in vagina (intercourse, douching, tampons) one week after the procedure.  Expect a clumpy blackish vaginal discharge (Monsel's solution) for several days.   Report bleeding heavier than a period, worsening pain, fever > 101.0 F, or foul-smelling vaginal discharge.  HPV vaccine recommended according to FDA age guidelines.  Importance of follow-up stressed.    Counseling lasted approximately 15 minutes and all her questions were answered.    FOLLOW-UP:   In 6 months to one year pending results

## 2025-08-07 LAB
ESTROGEN SERPL-MCNC: ABNORMAL PG/ML
INSULIN SERPL-ACNC: ABNORMAL U[IU]/ML
LAB AP CLINICAL INFORMATION: ABNORMAL
LAB AP DIAGNOSIS CATEGORY: ABNORMAL
LAB AP GROSS DESCRIPTION: ABNORMAL
LAB AP PERFORMING LOCATION(S): ABNORMAL
LAB AP REPORT FOOTNOTES: ABNORMAL

## 2025-08-11 ENCOUNTER — TELEPHONE (OUTPATIENT)
Dept: OBSTETRICS AND GYNECOLOGY | Facility: CLINIC | Age: 65
End: 2025-08-11
Payer: COMMERCIAL

## 2025-08-12 ENCOUNTER — TELEPHONE (OUTPATIENT)
Dept: OBSTETRICS AND GYNECOLOGY | Facility: CLINIC | Age: 65
End: 2025-08-12
Payer: COMMERCIAL

## 2025-08-13 DIAGNOSIS — I10 ESSENTIAL HYPERTENSION: ICD-10-CM

## 2025-08-20 ENCOUNTER — OFFICE VISIT (OUTPATIENT)
Dept: URGENT CARE | Facility: CLINIC | Age: 65
End: 2025-08-20
Payer: COMMERCIAL

## 2025-08-20 VITALS
WEIGHT: 200 LBS | RESPIRATION RATE: 20 BRPM | OXYGEN SATURATION: 98 % | SYSTOLIC BLOOD PRESSURE: 135 MMHG | DIASTOLIC BLOOD PRESSURE: 77 MMHG | HEART RATE: 71 BPM | BODY MASS INDEX: 33.32 KG/M2 | TEMPERATURE: 98 F | HEIGHT: 65 IN

## 2025-08-20 DIAGNOSIS — R09.82 ALLERGIC RHINITIS WITH POSTNASAL DRIP: ICD-10-CM

## 2025-08-20 DIAGNOSIS — J06.9 VIRAL URI WITH COUGH: Primary | ICD-10-CM

## 2025-08-20 DIAGNOSIS — J30.9 ALLERGIC RHINITIS WITH POSTNASAL DRIP: ICD-10-CM

## 2025-08-20 DIAGNOSIS — R06.7 SNEEZING: ICD-10-CM

## 2025-08-20 DIAGNOSIS — R09.82 POSTNASAL DRIP: ICD-10-CM

## 2025-08-20 DIAGNOSIS — R09.81 NASAL CONGESTION: ICD-10-CM

## 2025-08-20 DIAGNOSIS — J34.89 SINUS PRESSURE: ICD-10-CM

## 2025-08-20 LAB
CTP QC/QA: YES
SARS-COV+SARS-COV-2 AG RESP QL IA.RAPID: NEGATIVE

## 2025-08-20 PROCEDURE — 87811 SARS-COV-2 COVID19 W/OPTIC: CPT | Mod: QW,S$GLB,, | Performed by: PHYSICIAN ASSISTANT

## 2025-08-20 PROCEDURE — 99214 OFFICE O/P EST MOD 30 MIN: CPT | Mod: S$GLB,,, | Performed by: PHYSICIAN ASSISTANT

## 2025-08-20 RX ORDER — DEXTROMETHORPHAN HYDROBROMIDE, GUAIFENESIN, PHENYLEPHRINE HYDROCHLORIDE 17.5; 400; 1 MG/1; MG/1; MG/1
1 TABLET ORAL EVERY 8 HOURS PRN
Qty: 30 TABLET | Refills: 0 | Status: SHIPPED | OUTPATIENT
Start: 2025-08-20 | End: 2025-08-30